# Patient Record
Sex: MALE | Race: NATIVE HAWAIIAN OR OTHER PACIFIC ISLANDER | Employment: FULL TIME | URBAN - METROPOLITAN AREA
[De-identification: names, ages, dates, MRNs, and addresses within clinical notes are randomized per-mention and may not be internally consistent; named-entity substitution may affect disease eponyms.]

---

## 2023-07-24 ENCOUNTER — OFFICE VISIT (OUTPATIENT)
Dept: FAMILY MEDICINE CLINIC | Age: 63
End: 2023-07-24
Payer: COMMERCIAL

## 2023-07-24 VITALS
HEART RATE: 94 BPM | WEIGHT: 167 LBS | DIASTOLIC BLOOD PRESSURE: 62 MMHG | BODY MASS INDEX: 21.43 KG/M2 | SYSTOLIC BLOOD PRESSURE: 102 MMHG | TEMPERATURE: 99.9 F | HEIGHT: 74 IN | OXYGEN SATURATION: 95 %

## 2023-07-24 DIAGNOSIS — R63.4 UNINTENTIONAL WEIGHT LOSS OF MORE THAN 10 POUNDS IN 90 DAYS: ICD-10-CM

## 2023-07-24 DIAGNOSIS — R61 ABNORMAL FLUSHING AND SWEATING: ICD-10-CM

## 2023-07-24 DIAGNOSIS — R53.83 OTHER FATIGUE: ICD-10-CM

## 2023-07-24 DIAGNOSIS — Z12.5 ENCOUNTER FOR SCREENING FOR MALIGNANT NEOPLASM OF PROSTATE: ICD-10-CM

## 2023-07-24 DIAGNOSIS — I82.461 ACUTE DEEP VEIN THROMBOSIS (DVT) OF CALF MUSCLE VEIN OF RIGHT LOWER EXTREMITY (HCC): Primary | ICD-10-CM

## 2023-07-24 DIAGNOSIS — L03.116 CELLULITIS OF LEFT FOOT: ICD-10-CM

## 2023-07-24 DIAGNOSIS — Z13.220 SCREENING, LIPID: ICD-10-CM

## 2023-07-24 DIAGNOSIS — Z87.891 PERSONAL HISTORY OF TOBACCO USE: ICD-10-CM

## 2023-07-24 DIAGNOSIS — Z13.29 SCREENING FOR THYROID DISORDER: ICD-10-CM

## 2023-07-24 DIAGNOSIS — R73.9 ELEVATED BLOOD SUGAR: ICD-10-CM

## 2023-07-24 DIAGNOSIS — R23.2 ABNORMAL FLUSHING AND SWEATING: ICD-10-CM

## 2023-07-24 DIAGNOSIS — Z13.0 SCREENING, ANEMIA, DEFICIENCY, IRON: ICD-10-CM

## 2023-07-24 DIAGNOSIS — M25.472 LEFT ANKLE SWELLING: ICD-10-CM

## 2023-07-24 PROCEDURE — 99204 OFFICE O/P NEW MOD 45 MIN: CPT | Performed by: INTERNAL MEDICINE

## 2023-07-24 PROCEDURE — G0296 VISIT TO DETERM LDCT ELIG: HCPCS | Performed by: INTERNAL MEDICINE

## 2023-07-24 RX ORDER — CEPHALEXIN 500 MG/1
500 CAPSULE ORAL 4 TIMES DAILY
Qty: 28 CAPSULE | Refills: 0 | COMMUNITY
Start: 2023-07-18 | End: 2023-07-24

## 2023-07-24 RX ORDER — COLCHICINE 0.6 MG/1
TABLET ORAL
Qty: 3 TABLET | Refills: 1 | Status: SHIPPED | OUTPATIENT
Start: 2023-07-24 | End: 2023-08-02

## 2023-07-24 RX ORDER — SULFAMETHOXAZOLE AND TRIMETHOPRIM 800; 160 MG/1; MG/1
1 TABLET ORAL 2 TIMES DAILY
Qty: 14 TABLET | Refills: 0 | COMMUNITY
Start: 2023-07-18 | End: 2023-07-24

## 2023-07-24 SDOH — ECONOMIC STABILITY: FOOD INSECURITY: WITHIN THE PAST 12 MONTHS, THE FOOD YOU BOUGHT JUST DIDN'T LAST AND YOU DIDN'T HAVE MONEY TO GET MORE.: NEVER TRUE

## 2023-07-24 SDOH — ECONOMIC STABILITY: HOUSING INSECURITY
IN THE LAST 12 MONTHS, WAS THERE A TIME WHEN YOU DID NOT HAVE A STEADY PLACE TO SLEEP OR SLEPT IN A SHELTER (INCLUDING NOW)?: NO

## 2023-07-24 SDOH — ECONOMIC STABILITY: FOOD INSECURITY: WITHIN THE PAST 12 MONTHS, YOU WORRIED THAT YOUR FOOD WOULD RUN OUT BEFORE YOU GOT MONEY TO BUY MORE.: NEVER TRUE

## 2023-07-24 SDOH — ECONOMIC STABILITY: INCOME INSECURITY: HOW HARD IS IT FOR YOU TO PAY FOR THE VERY BASICS LIKE FOOD, HOUSING, MEDICAL CARE, AND HEATING?: NOT HARD AT ALL

## 2023-07-24 ASSESSMENT — PATIENT HEALTH QUESTIONNAIRE - PHQ9
SUM OF ALL RESPONSES TO PHQ QUESTIONS 1-9: 0
SUM OF ALL RESPONSES TO PHQ9 QUESTIONS 1 & 2: 0
1. LITTLE INTEREST OR PLEASURE IN DOING THINGS: 0
SUM OF ALL RESPONSES TO PHQ QUESTIONS 1-9: 0
SUM OF ALL RESPONSES TO PHQ QUESTIONS 1-9: 0
2. FEELING DOWN, DEPRESSED OR HOPELESS: 0
SUM OF ALL RESPONSES TO PHQ QUESTIONS 1-9: 0

## 2023-07-24 NOTE — PROGRESS NOTES
Pt is here today to establish care    Pt was in [facility] ER on 07/15/2023 and 07/18/2023   Pt had a blood clot on RT lower leg, and cellulitis of the LT foot,     Blood cot has gotten better, pt on Eliquis on 07/15/2023     Foot is about the same     Pt states that his neck seems to have swollen up since starting the blood thinner and Keflex, 07/18/2023 fr antibiotic, swelling going down, feels like a sore throat, worse when he wakes up in the morning

## 2023-07-24 NOTE — PROGRESS NOTES
[facility]  [facility] Rashida Grant [address]  [address] [facility] [address]  Dept: [phone]  Dept Fax: [phone]      Lopez Frederick is a 65 y.o. male who presents today for hismedical conditions/complaints as noted below. Lopez Frederick is c/o of [facility]        Assessment/Plan:     1. Acute deep vein thrombosis (DVT) of 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 (HCC)  2. Cellulitis of left foot  3. Other fatigue  4. Abnormal flushing and sweating  5. Screening, anemia, deficiency, iron  -     CBC with Auto Differential; Future  -     Comprehensive Metabolic Panel; Future  6. Screening for thyroid disorder  -     TSH With Reflex Ft4; Future  7. Screening, lipid  -     Lipid, Fasting; Future  8. Encounter for screening for malignant neoplasm of prostate  -     PSA Screening; Future  9. Left ankle swelling  -     Uric Acid; Future  -     colchicine (COLCRYS) 0.6 MG tablet; Take two tablets by mouth once, then repeat third tablet an hour later if needed for acute gout flare., Disp-3 tablet, R-1Normal  10. Elevated blood sugar  -     Hemoglobin A1C; Future  11. Unintentional weight loss of more than 10 pounds in 90 days  12. Personal history of tobacco use  -     RI VISIT TO DISCUSS LUNG CA SCREEN W LDCT  -     CT Lung Screen (Annual/Baseline); Future          No follow-ups on file. HPI     Here to est care    Had L foot pain in arch around 7/4, tried switching out supports and slowly got better. R calf pain started shortly thereafter, continued to get worse, went to the ER four days later on 7/15 and was diagnosed with RLE DVT. He and his wife had gone on a road trip for the day a couple days prior to onset of RLE pain, but they made frequent stops to get out of the car and was not in the car more than 90 minutes at a time. Went back to the ER with cellulitis of the left foot on 7/18, xray done, discharged home with Keflex.    His neck swelled up aftr starting the antibiotics but

## 2023-07-25 ENCOUNTER — HOSPITAL ENCOUNTER (OUTPATIENT)
Age: 63
Setting detail: SPECIMEN
Discharge: HOME | End: 2023-07-25

## 2023-07-25 DIAGNOSIS — Z13.29 SCREENING FOR THYROID DISORDER: ICD-10-CM

## 2023-07-25 DIAGNOSIS — R73.9 ELEVATED BLOOD SUGAR: ICD-10-CM

## 2023-07-25 DIAGNOSIS — M25.472 LEFT ANKLE SWELLING: ICD-10-CM

## 2023-07-25 DIAGNOSIS — Z13.220 SCREENING, LIPID: ICD-10-CM

## 2023-07-25 DIAGNOSIS — Z12.5 ENCOUNTER FOR SCREENING FOR MALIGNANT NEOPLASM OF PROSTATE: ICD-10-CM

## 2023-07-25 DIAGNOSIS — Z13.0 SCREENING, ANEMIA, DEFICIENCY, IRON: ICD-10-CM

## 2023-07-25 LAB
ALBUMIN SERPL-MCNC: 3.9 G/DL (ref 3.5–5.2)
ALBUMIN/GLOB SERPL: 1 {RATIO} (ref 1–2.5)
ALP SERPL-CCNC: 82 U/L (ref 40–129)
ALT SERPL-CCNC: 11 U/L (ref 5–41)
ANION GAP SERPL CALCULATED.3IONS-SCNC: 15 MMOL/L (ref 9–17)
AST SERPL-CCNC: 20 U/L
BASOPHILS # BLD: 0.09 K/UL (ref 0–0.2)
BASOPHILS NFR BLD: 1 % (ref 0–2)
BILIRUB SERPL-MCNC: 0.4 MG/DL (ref 0.3–1.2)
BUN SERPL-MCNC: 17 MG/DL (ref 8–23)
CALCIUM SERPL-MCNC: 9.5 MG/DL (ref 8.6–10.4)
CHLORIDE SERPL-SCNC: 103 MMOL/L (ref 98–107)
CHOLEST SERPL-MCNC: 133 MG/DL
CHOLESTEROL/HDL RATIO: 4.8
CO2 SERPL-SCNC: 21 MMOL/L (ref 20–31)
CREAT SERPL-MCNC: 0.8 MG/DL (ref 0.7–1.2)
EOSINOPHIL # BLD: 0.43 K/UL (ref 0–0.44)
EOSINOPHILS RELATIVE PERCENT: 4 % (ref 1–4)
ERYTHROCYTE [DISTWIDTH] IN BLOOD BY AUTOMATED COUNT: 14 % (ref 11.8–14.4)
GFR SERPL CREATININE-BSD FRML MDRD: >60 ML/MIN/1.73M2
GLUCOSE SERPL-MCNC: 86 MG/DL (ref 70–99)
HCT VFR BLD AUTO: 38.4 % (ref 40.7–50.3)
HDLC SERPL-MCNC: 28 MG/DL
HGB BLD-MCNC: 12 G/DL (ref 13–17)
IMM GRANULOCYTES # BLD AUTO: 0.03 K/UL (ref 0–0.3)
IMM GRANULOCYTES NFR BLD: 0 %
LDLC SERPL CALC-MCNC: 88 MG/DL (ref 0–130)
LYMPHOCYTES NFR BLD: 2.09 K/UL (ref 1.1–3.7)
LYMPHOCYTES RELATIVE PERCENT: 19 % (ref 24–43)
MCH RBC QN AUTO: 28 PG (ref 25.2–33.5)
MCHC RBC AUTO-ENTMCNC: 31.3 G/DL (ref 28.4–34.8)
MCV RBC AUTO: 89.5 FL (ref 82.6–102.9)
MONOCYTES NFR BLD: 1.18 K/UL (ref 0.1–1.2)
MONOCYTES NFR BLD: 11 % (ref 3–12)
NEUTROPHILS NFR BLD: 65 % (ref 36–65)
NEUTS SEG NFR BLD: 7.15 K/UL (ref 1.5–8.1)
NRBC BLD-RTO: 0 PER 100 WBC
PLATELET # BLD AUTO: 304 K/UL (ref 138–453)
PMV BLD AUTO: 10.8 FL (ref 8.1–13.5)
POTASSIUM SERPL-SCNC: 4.5 MMOL/L (ref 3.7–5.3)
PROT SERPL-MCNC: 7.7 G/DL (ref 6.4–8.3)
PSA SERPL-MCNC: 1.62 NG/ML
RBC # BLD AUTO: 4.29 M/UL (ref 4.21–5.77)
SODIUM SERPL-SCNC: 139 MMOL/L (ref 135–144)
T4 FREE SERPL-MCNC: 3.3 NG/DL (ref 0.9–1.7)
TRIGL SERPL-MCNC: 84 MG/DL
TSH SERPL DL<=0.05 MIU/L-ACNC: <0.01 UIU/ML (ref 0.3–5)
URATE SERPL-MCNC: 4.6 MG/DL (ref 3.4–7)
WBC OTHER # BLD: 11 K/UL (ref 3.5–11.3)

## 2023-07-26 LAB
EST. AVERAGE GLUCOSE BLD GHB EST-MCNC: 114 MG/DL
HBA1C MFR BLD: 5.6 % (ref 4–6)

## 2023-07-27 ENCOUNTER — TELEPHONE (OUTPATIENT)
Dept: ONCOLOGY | Age: 63
End: 2023-07-27

## 2023-07-31 ENCOUNTER — APPOINTMENT (OUTPATIENT)
Dept: GENERAL RADIOLOGY | Age: 63
End: 2023-07-31
Payer: COMMERCIAL

## 2023-07-31 ENCOUNTER — HOSPITAL ENCOUNTER (EMERGENCY)
Age: 63
Discharge: HOME | End: 2023-07-31
Attending: EMERGENCY MEDICINE
Payer: COMMERCIAL

## 2023-07-31 ENCOUNTER — APPOINTMENT (OUTPATIENT)
Dept: CT IMAGING | Age: 63
End: 2023-07-31
Payer: COMMERCIAL

## 2023-07-31 VITALS
SYSTOLIC BLOOD PRESSURE: 137 MMHG | HEART RATE: 57 BPM | OXYGEN SATURATION: 100 % | RESPIRATION RATE: 12 BRPM | TEMPERATURE: 97.5 F | DIASTOLIC BLOOD PRESSURE: 70 MMHG

## 2023-07-31 DIAGNOSIS — R91.8 MASS OF RIGHT LUNG: Primary | ICD-10-CM

## 2023-07-31 LAB
ANION GAP SERPL CALCULATED.3IONS-SCNC: 16 MMOL/L (ref 9–17)
BASOPHILS # BLD: 0.05 K/UL (ref 0–0.2)
BASOPHILS NFR BLD: 0 % (ref 0–2)
BNP SERPL-MCNC: 326 PG/ML
BUN SERPL-MCNC: 23 MG/DL (ref 8–23)
CALCIUM SERPL-MCNC: 9.2 MG/DL (ref 8.6–10.4)
CHLORIDE SERPL-SCNC: 100 MMOL/L (ref 98–107)
CO2 SERPL-SCNC: 23 MMOL/L (ref 20–31)
CREAT SERPL-MCNC: 0.7 MG/DL (ref 0.7–1.2)
EOSINOPHIL # BLD: 0.17 K/UL (ref 0–0.44)
EOSINOPHILS RELATIVE PERCENT: 1 % (ref 1–4)
ERYTHROCYTE [DISTWIDTH] IN BLOOD BY AUTOMATED COUNT: 13.2 % (ref 11.8–14.4)
GFR SERPL CREATININE-BSD FRML MDRD: >60 ML/MIN/1.73M2
GLUCOSE SERPL-MCNC: 97 MG/DL (ref 70–99)
HCT VFR BLD AUTO: 36.9 % (ref 40.7–50.3)
HGB BLD-MCNC: 11.9 G/DL (ref 13–17)
IMM GRANULOCYTES # BLD AUTO: 0.06 K/UL (ref 0–0.3)
IMM GRANULOCYTES NFR BLD: 1 %
LYMPHOCYTES NFR BLD: 1.33 K/UL (ref 1.1–3.7)
LYMPHOCYTES RELATIVE PERCENT: 11 % (ref 24–43)
MCH RBC QN AUTO: 27.7 PG (ref 25.2–33.5)
MCHC RBC AUTO-ENTMCNC: 32.2 G/DL (ref 28.4–34.8)
MCV RBC AUTO: 85.8 FL (ref 82.6–102.9)
MONOCYTES NFR BLD: 0.8 K/UL (ref 0.1–1.2)
MONOCYTES NFR BLD: 7 % (ref 3–12)
NEUTROPHILS NFR BLD: 80 % (ref 36–65)
NEUTS SEG NFR BLD: 9.77 K/UL (ref 1.5–8.1)
NRBC BLD-RTO: 0 PER 100 WBC
PLATELET # BLD AUTO: 341 K/UL (ref 138–453)
PMV BLD AUTO: 11 FL (ref 8.1–13.5)
POTASSIUM SERPL-SCNC: 4.6 MMOL/L (ref 3.7–5.3)
RBC # BLD AUTO: 4.3 M/UL (ref 4.21–5.77)
SODIUM SERPL-SCNC: 139 MMOL/L (ref 135–144)
T4 FREE SERPL-MCNC: 2.3 NG/DL (ref 0.9–1.7)
TROPONIN I SERPL HS-MCNC: 9 NG/L (ref 0–22)
WBC OTHER # BLD: 12.2 K/UL (ref 3.5–11.3)

## 2023-07-31 PROCEDURE — 84439 ASSAY OF FREE THYROXINE: CPT

## 2023-07-31 PROCEDURE — 93005 ELECTROCARDIOGRAM TRACING: CPT

## 2023-07-31 PROCEDURE — 96374 THER/PROPH/DIAG INJ IV PUSH: CPT

## 2023-07-31 PROCEDURE — 71045 X-RAY EXAM CHEST 1 VIEW: CPT

## 2023-07-31 PROCEDURE — 6360000002 HC RX W HCPCS: Performed by: EMERGENCY MEDICINE

## 2023-07-31 PROCEDURE — 84484 ASSAY OF TROPONIN QUANT: CPT

## 2023-07-31 PROCEDURE — 71260 CT THORAX DX C+: CPT

## 2023-07-31 PROCEDURE — 85025 COMPLETE CBC W/AUTO DIFF WBC: CPT

## 2023-07-31 PROCEDURE — 74177 CT ABD & PELVIS W/CONTRAST: CPT

## 2023-07-31 PROCEDURE — 6360000004 HC RX CONTRAST MEDICATION

## 2023-07-31 PROCEDURE — 96376 TX/PRO/DX INJ SAME DRUG ADON: CPT

## 2023-07-31 PROCEDURE — 99285 EMERGENCY DEPT VISIT HI MDM: CPT

## 2023-07-31 PROCEDURE — 2580000003 HC RX 258

## 2023-07-31 PROCEDURE — 6360000002 HC RX W HCPCS

## 2023-07-31 PROCEDURE — 80048 BASIC METABOLIC PNL TOTAL CA: CPT

## 2023-07-31 PROCEDURE — 83880 ASSAY OF NATRIURETIC PEPTIDE: CPT

## 2023-07-31 RX ORDER — 0.9 % SODIUM CHLORIDE 0.9 %
1000 INTRAVENOUS SOLUTION INTRAVENOUS ONCE
Status: COMPLETED | OUTPATIENT
Start: 2023-07-31 | End: 2023-07-31

## 2023-07-31 RX ORDER — ONDANSETRON 4 MG/1
4 TABLET, ORALLY DISINTEGRATING ORAL 3 TIMES DAILY PRN
Qty: 21 TABLET | Refills: 0 | Status: SHIPPED | OUTPATIENT
Start: 2023-07-31 | End: 2023-08-02

## 2023-07-31 RX ORDER — ONDANSETRON 2 MG/ML
4 INJECTION INTRAMUSCULAR; INTRAVENOUS ONCE
Status: COMPLETED | OUTPATIENT
Start: 2023-07-31 | End: 2023-07-31

## 2023-07-31 RX ADMIN — IOPAMIDOL 75 ML: 755 INJECTION, SOLUTION INTRAVENOUS at 14:16

## 2023-07-31 RX ADMIN — SODIUM CHLORIDE 1000 ML: 9 INJECTION, SOLUTION INTRAVENOUS at 11:46

## 2023-07-31 RX ADMIN — ONDANSETRON 4 MG: 2 INJECTION INTRAMUSCULAR; INTRAVENOUS at 11:46

## 2023-07-31 RX ADMIN — ONDANSETRON 4 MG: 2 INJECTION INTRAMUSCULAR; INTRAVENOUS at 12:34

## 2023-07-31 NOTE — DISCHARGE INSTRUCTIONS
You are seen and evaluated for nausea, vomiting, shortness of breath. You are found to have a mass in the right upper lung lobe which is concerning for cancer. You will need to follow-up with your primary care provider to begin the work-up to evaluate this mass further. You are also found to have emphysematous changes concerning for emphysema and should discuss this with your primary care provider. You can use Zofran as needed for nausea. If you begin to experience worsening shortness of breath, chest pain, you should return the emergency department for further evaluation.

## 2023-07-31 NOTE — ED PROVIDER NOTES
[address] ED  Emergency Department Encounter  Emergency Medicine Resident     Pt Name:Jony Johnson  MRN: [id number]  [address] [facility] 1960  Date of evaluation: 7/31/23  PCP:  Marie Rios MD  Note Started: 1:43 PM EDT      CHIEF COMPLAINT       Chief Complaint   Patient presents with    Other     SOB, N/V, Chills, Afebrile       HISTORY OF PRESENT ILLNESS  (Location/Symptom, Timing/Onset, Context/Setting, Quality, Duration, Modifying Factors, Severity.)      Stevenson Mallory is a 65 y.o. male with PMHx of right leg DVT (on Eliquis 07/25/23) who presents with shortness of breath. States SOB began this morning and is accompanied with lightheadedness and intermittent hot/cold flashes. Denies chest pain. Reports vomiting and abdominal pain for the past two days that has resolved. Denies head trauma and LOC. Denies epistaxis, hemoptysis and bloody stools. Patient was recently treated for left foot cellulitis with Bactrim and Keflex, but treatment was discontinued due to neck swelling. PAST MEDICAL / SURGICAL / SOCIAL / FAMILY HISTORY      has no past medical history on file. Right leg unprovoked DVT 7/25/23. Left foot cellulitis. has a past surgical history that includes cyst removal (2010).     Social History     Socioeconomic History    Marital status:      Spouse name: Not on file    Number of children: Not on file    Years of education: Not on file    Highest education level: Not on file   Occupational History    Not on file   Tobacco Use    Smoking status: Every Day     Packs/day: 1.00     Years: 40.00     Pack years: 40.00     Types: Cigarettes     Start date: 3/12/1985    Smokeless tobacco: Never    Tobacco comments:     Currently down to half pack per day - 7/2023   Vaping Use    Vaping Use: Never used   Substance and Sexual Activity    Alcohol use: No    Drug use: No    Sexual activity: Not on file   Other Topics Concern    Not on file   Social History Narrative    Not on file

## 2023-07-31 NOTE — ED NOTES
Pt respirations are even and unlabored, pt is alert and oriented X 4, speaking in complete sentences, bed is in the lowest position, call light is within reach, NAD noted. Will continue to follow plan of care.         Miley Reddy RN  07/31/23 1015

## 2023-07-31 NOTE — ED NOTES
Pt respirations are even and unlabored, pt is alert and oriented X 4, speaking in complete sentences, bed is in the lowest position, call light is within reach, NAD noted. Will continue to follow plan of care.         Jack Núñez RN  07/31/23 5311

## 2023-07-31 NOTE — ED NOTES
Pt respirations are even and unlabored, pt is alert and oriented X 4, speaking in complete sentences, bed is in the lowest position, call light is within reach, NAD noted. Will continue to follow plan of care.         Valerie Gallegos RN  07/31/23 7381

## 2023-07-31 NOTE — ED NOTES
Pt arrives to ED 35 via triage. Pt co nausea, vomiting and chills. Pt states that the symptoms began 2 days ago, but got better yesterday and he was able to eat yesterday. Pt states that when waking up this morning he began to vomit and experienced nausea and chills. Pt states that he has also been experiencing SOB that is new today. Pt states that he has been experiencing abdominal pain in the left lower quadrant. Pt states that he recently was diagnosed with a DVT in his leg, and was placed on blood thinners. Pt denies any blood in his vomit. Pt states that he was also diagnosed with gout and was put on two different antibiotics. Pt respirations are even and unlabored, pt is alert and oriented X 4, speaking in complete sentences, bed is in the lowest position, call light is within reach. Will continue to follow plan of care.         Cassia Faria RN  07/31/23 0898

## 2023-07-31 NOTE — ED NOTES
Pt respirations are even and unlabored, pt is alert and oriented X 4, speaking in complete sentences, bed is in the lowest position, call light is within reach, NAD noted. Will continue to follow plan of care.         Champ Batch, RN  07/31/23 8578

## 2023-08-01 DIAGNOSIS — E05.90 HYPERTHYROIDISM: Primary | ICD-10-CM

## 2023-08-02 ENCOUNTER — TELEPHONE (OUTPATIENT)
Dept: CASE MANAGEMENT | Age: 63
End: 2023-08-02

## 2023-08-02 ENCOUNTER — HOSPITAL ENCOUNTER (OUTPATIENT)
Age: 63
Setting detail: SPECIMEN
Discharge: HOME | End: 2023-08-02

## 2023-08-02 ENCOUNTER — OFFICE VISIT (OUTPATIENT)
Dept: FAMILY MEDICINE CLINIC | Age: 63
End: 2023-08-02
Payer: COMMERCIAL

## 2023-08-02 VITALS
OXYGEN SATURATION: 98 % | SYSTOLIC BLOOD PRESSURE: 106 MMHG | DIASTOLIC BLOOD PRESSURE: 68 MMHG | WEIGHT: 164 LBS | BODY MASS INDEX: 20.91 KG/M2 | TEMPERATURE: 98.2 F | HEART RATE: 78 BPM

## 2023-08-02 DIAGNOSIS — R63.4 UNINTENTIONAL WEIGHT LOSS OF MORE THAN 10 POUNDS IN 90 DAYS: ICD-10-CM

## 2023-08-02 DIAGNOSIS — E04.9 GOITER: ICD-10-CM

## 2023-08-02 DIAGNOSIS — E05.90 HYPERTHYROIDISM: ICD-10-CM

## 2023-08-02 DIAGNOSIS — R91.8 MASS OF UPPER LOBE OF RIGHT LUNG: Primary | ICD-10-CM

## 2023-08-02 DIAGNOSIS — I82.461 ACUTE DEEP VEIN THROMBOSIS (DVT) OF CALF MUSCLE VEIN OF RIGHT LOWER EXTREMITY (HCC): ICD-10-CM

## 2023-08-02 PROCEDURE — 99215 OFFICE O/P EST HI 40 MIN: CPT | Performed by: INTERNAL MEDICINE

## 2023-08-02 RX ORDER — METHIMAZOLE 5 MG/1
5 TABLET ORAL DAILY
Qty: 30 TABLET | Refills: 1 | Status: SHIPPED | OUTPATIENT
Start: 2023-08-02

## 2023-08-02 NOTE — TELEPHONE ENCOUNTER
Name: Preet Mcgowan  : 1960  MRN: [id number]    Oncology Navigation- Initial Note:  (Unknown)  Navigator received message from Dr. Steven Foy notifying writer of pt. Needing assistance . Writer will add self to care team and reach out to pt.    Electronically signed by Kasie Whitfield RN on 2023 at 4:22 PM

## 2023-08-03 ENCOUNTER — TELEPHONE (OUTPATIENT)
Dept: ONCOLOGY | Age: 63
End: 2023-08-03

## 2023-08-03 ENCOUNTER — TELEPHONE (OUTPATIENT)
Dept: CASE MANAGEMENT | Age: 63
End: 2023-08-03

## 2023-08-03 LAB
EKG ATRIAL RATE: 50 BPM
EKG P AXIS: 59 DEGREES
EKG P-R INTERVAL: 106 MS
EKG Q-T INTERVAL: 442 MS
EKG QRS DURATION: 94 MS
EKG QTC CALCULATION (BAZETT): 402 MS
EKG R AXIS: 76 DEGREES
EKG T AXIS: 72 DEGREES
EKG VENTRICULAR RATE: 50 BPM

## 2023-08-03 NOTE — TELEPHONE ENCOUNTER
RECEIVED A CALL FROM FAM IN IR STATING PT'S LUNG BX CAN BE SCHEDULED FOR 08/10/23 HOWEVER THAT IS WHEN HIS PET/CT . WRITER CALLED TO RESCHEDULE PET/CT. PET/CT MOVED TO 08/11/23 @3:30PM. WRITER INFORMED PT OF APPOINTMENT CHANGES. BX SCHEDULED 08/10/23. DR Lenard Francis WILL SEE PT FOR CONSULTATION APPOINTMENT 08/14/23.

## 2023-08-03 NOTE — TELEPHONE ENCOUNTER
Name: Emily Bergeron  : 1960  MRN: [id number]    Oncology Navigation- Initial Note:  Navigator spoke with pt. Offering assistance . Pt. Given contact information and resources offered if needed. Pt. Awaits Bx date, however writer will follow closely.   Electronically signed by Renetta Rivas RN on 8/3/2023 at 8:47 AM

## 2023-08-04 LAB
THYROGLOBULIN AB: <12 IU/ML (ref 0–40)
THYROPEROXIDASE AB SERPL IA-ACNC: <4 IU/ML (ref 0–25)

## 2023-08-09 ASSESSMENT — ENCOUNTER SYMPTOMS
VOMITING: 0
DIARRHEA: 0
ABDOMINAL PAIN: 0
ANAL BLEEDING: 0
BLOOD IN STOOL: 0
CHEST TIGHTNESS: 0
SHORTNESS OF BREATH: 0
CONSTIPATION: 0
COUGH: 0
NAUSEA: 0
WHEEZING: 0
CHOKING: 0

## 2023-08-09 ASSESSMENT — VISUAL ACUITY: OU: 1

## 2023-08-10 ENCOUNTER — HOSPITAL ENCOUNTER (OUTPATIENT)
Dept: CT IMAGING | Age: 63
Discharge: HOME | End: 2023-08-12
Payer: COMMERCIAL

## 2023-08-10 VITALS
OXYGEN SATURATION: 98 % | TEMPERATURE: 97.5 F | DIASTOLIC BLOOD PRESSURE: 73 MMHG | RESPIRATION RATE: 16 BRPM | HEIGHT: 75 IN | WEIGHT: 165 LBS | SYSTOLIC BLOOD PRESSURE: 104 MMHG | BODY MASS INDEX: 20.51 KG/M2 | HEART RATE: 72 BPM

## 2023-08-10 DIAGNOSIS — R91.8 MASS OF UPPER LOBE OF RIGHT LUNG: ICD-10-CM

## 2023-08-10 LAB
INR PPP: NORMAL
PLATELET # BLD AUTO: 374 K/UL (ref 138–453)
PROTHROMBIN TIME: NORMAL SEC (ref 11.7–14.9)
REASON FOR REJECTION: NORMAL
SPECIMEN SOURCE: NORMAL
ZZ NTE CLEAN UP: ORDERED TEST: NORMAL

## 2023-08-10 PROCEDURE — 2580000003 HC RX 258: Performed by: PHYSICIAN ASSISTANT

## 2023-08-10 PROCEDURE — 85610 PROTHROMBIN TIME: CPT

## 2023-08-10 PROCEDURE — 85049 AUTOMATED PLATELET COUNT: CPT

## 2023-08-10 RX ORDER — SODIUM CHLORIDE 9 MG/ML
INJECTION, SOLUTION INTRAVENOUS CONTINUOUS
Status: DISCONTINUED | OUTPATIENT
Start: 2023-08-10 | End: 2023-08-13 | Stop reason: HOSPADM

## 2023-08-10 RX ADMIN — SODIUM CHLORIDE: 9 INJECTION, SOLUTION INTRAVENOUS at 09:40

## 2023-08-10 ASSESSMENT — PAIN - FUNCTIONAL ASSESSMENT: PAIN_FUNCTIONAL_ASSESSMENT: 0-10

## 2023-08-10 NOTE — H&P
History and Physical    Pt Name: Azell Cockayne  MRN: [id number]  YOB: 1960  Date of evaluation: 8/10/2023    SUBJECTIVE:   History of Chief Complaint:    Patient presents preprocedure for lung biopsy. He says that he was experiencing difficulty with his breathing so he went to the ER. On imaging, he was noted to have lung abnormality. He has been scheduled now for biopsy. Patient has a history of smoking, less than 1 PPD for 40 years, just quit recently. Past Medical History    has a past medical history of History of DVT (deep vein thrombosis), Snores, and Thyroid disease. Past Surgical History   has a past surgical history that includes cyst removal (2010) and Dental surgery. Medications  Prior to Admission medications    Medication Sig Start Date End Date Taking? Authorizing Provider   methIMAzole (TAPAZOLE) 5 MG tablet Take 1 tablet by mouth daily 8/2/23   Keisha Nj MD   apixaban (ELIQUIS) 5 MG TABS tablet Take 1 tablet by mouth in the morning and at bedtime 7/15/23 8/14/23  Historical Provider, MD     Allergies  has No Known Allergies. Family History  family history includes Heart Attack (age of onset: 39) in his brother; Heart Disease in his brother and father; Leukemia (age of onset: 80) in his mother; Other in his father. Social History   reports that he has been smoking cigarettes. He started smoking about 38 years ago. He has a 40.00 pack-year smoking history. He has never used smokeless tobacco.   reports no history of alcohol use. reports no history of drug use.   Marital Status     Review of Systems:  CONSTITUTIONAL:   negative for fevers, chills, fatigue and malaise    EYES:   negative for double vision, blurred vision and photophobia    HEENT:   negative for tinnitus, epistaxis and sore throat     RESPIRATORY:   See HPI   CARDIOVASCULAR:   negative for chest pain, palpitations, syncope, edema  positive for DVT   GASTROINTESTINAL:   negative for nausea, vomiting

## 2023-08-10 NOTE — H&P (VIEW-ONLY)
History and Physical    Pt Name: Stevenson Mallory  MRN: [id number]  YOB: 1960  Date of evaluation: 8/10/2023    SUBJECTIVE:   History of Chief Complaint:    Patient presents preprocedure for lung biopsy. He says that he was experiencing difficulty with his breathing so he went to the ER. On imaging, he was noted to have lung abnormality. He has been scheduled now for biopsy. Patient has a history of smoking, less than 1 PPD for 40 years, just quit recently. Past Medical History    has a past medical history of History of DVT (deep vein thrombosis), Snores, and Thyroid disease. Past Surgical History   has a past surgical history that includes cyst removal (2010) and Dental surgery. Medications  Prior to Admission medications    Medication Sig Start Date End Date Taking? Authorizing Provider   methIMAzole (TAPAZOLE) 5 MG tablet Take 1 tablet by mouth daily 8/2/23   Keisha Snowden MD   apixaban (ELIQUIS) 5 MG TABS tablet Take 1 tablet by mouth in the morning and at bedtime 7/15/23 8/14/23  Historical Provider, MD     Allergies  has No Known Allergies. Family History  family history includes Heart Attack (age of onset: 39) in his brother; Heart Disease in his brother and father; Leukemia (age of onset: 80) in his mother; Other in his father. Social History   reports that he has been smoking cigarettes. He started smoking about 38 years ago. He has a 40.00 pack-year smoking history. He has never used smokeless tobacco.   reports no history of alcohol use. reports no history of drug use.   Marital Status     Review of Systems:  CONSTITUTIONAL:   negative for fevers, chills, fatigue and malaise    EYES:   negative for double vision, blurred vision and photophobia    HEENT:   negative for tinnitus, epistaxis and sore throat     RESPIRATORY:   See HPI   CARDIOVASCULAR:   negative for chest pain, palpitations, syncope, edema  positive for DVT   GASTROINTESTINAL:   negative for nausea, vomiting

## 2023-08-11 ENCOUNTER — HOSPITAL ENCOUNTER (OUTPATIENT)
Dept: NUCLEAR MEDICINE | Age: 63
End: 2023-08-11
Payer: COMMERCIAL

## 2023-08-11 ENCOUNTER — APPOINTMENT (OUTPATIENT)
Dept: NUCLEAR MEDICINE | Age: 63
End: 2023-08-11
Payer: COMMERCIAL

## 2023-08-11 DIAGNOSIS — R91.8 MASS OF UPPER LOBE OF RIGHT LUNG: ICD-10-CM

## 2023-08-11 PROCEDURE — 2580000003 HC RX 258: Performed by: INTERNAL MEDICINE

## 2023-08-11 PROCEDURE — 78815 PET IMAGE W/CT SKULL-THIGH: CPT

## 2023-08-11 PROCEDURE — 3430000000 HC RX DIAGNOSTIC RADIOPHARMACEUTICAL: Performed by: INTERNAL MEDICINE

## 2023-08-11 PROCEDURE — 82947 ASSAY GLUCOSE BLOOD QUANT: CPT

## 2023-08-11 PROCEDURE — A9552 F18 FDG: HCPCS | Performed by: INTERNAL MEDICINE

## 2023-08-11 RX ORDER — SODIUM CHLORIDE 0.9 % (FLUSH) 0.9 %
10 SYRINGE (ML) INJECTION
Status: COMPLETED | OUTPATIENT
Start: 2023-08-11 | End: 2023-08-11

## 2023-08-11 RX ORDER — FLUDEOXYGLUCOSE F 18 200 MCI/ML
10 INJECTION, SOLUTION INTRAVENOUS
Status: COMPLETED | OUTPATIENT
Start: 2023-08-11 | End: 2023-08-11

## 2023-08-11 RX ADMIN — SODIUM CHLORIDE, PRESERVATIVE FREE 10 ML: 5 INJECTION INTRAVENOUS at 15:25

## 2023-08-11 RX ADMIN — FLUDEOXYGLUCOSE F 18 10.1 MILLICURIE: 200 INJECTION, SOLUTION INTRAVENOUS at 15:25

## 2023-08-14 LAB — GLUCOSE BLD-MCNC: 82 MG/DL (ref 75–110)

## 2023-08-15 ENCOUNTER — HOSPITAL ENCOUNTER (OUTPATIENT)
Dept: CT IMAGING | Age: 63
Discharge: HOME | End: 2023-08-17
Payer: COMMERCIAL

## 2023-08-15 ENCOUNTER — HOSPITAL ENCOUNTER (OUTPATIENT)
Dept: ULTRASOUND IMAGING | Age: 63
Discharge: HOME | End: 2023-08-17
Payer: COMMERCIAL

## 2023-08-15 VITALS
OXYGEN SATURATION: 97 % | DIASTOLIC BLOOD PRESSURE: 74 MMHG | RESPIRATION RATE: 15 BRPM | SYSTOLIC BLOOD PRESSURE: 116 MMHG | HEART RATE: 63 BPM

## 2023-08-15 VITALS
DIASTOLIC BLOOD PRESSURE: 73 MMHG | RESPIRATION RATE: 16 BRPM | TEMPERATURE: 96.6 F | HEART RATE: 62 BPM | OXYGEN SATURATION: 97 % | SYSTOLIC BLOOD PRESSURE: 112 MMHG

## 2023-08-15 DIAGNOSIS — R91.8 MASS OF UPPER LOBE OF RIGHT LUNG: ICD-10-CM

## 2023-08-15 PROCEDURE — 38505 NEEDLE BIOPSY LYMPH NODES: CPT

## 2023-08-15 PROCEDURE — 76942 ECHO GUIDE FOR BIOPSY: CPT

## 2023-08-15 PROCEDURE — 2580000003 HC RX 258: Performed by: PHYSICIAN ASSISTANT

## 2023-08-15 PROCEDURE — 7100000010 HC PHASE II RECOVERY - FIRST 15 MIN: Performed by: RADIOLOGY

## 2023-08-15 RX ORDER — SODIUM CHLORIDE 9 MG/ML
INJECTION, SOLUTION INTRAVENOUS CONTINUOUS
Status: DISCONTINUED | OUTPATIENT
Start: 2023-08-15 | End: 2023-08-18 | Stop reason: HOSPADM

## 2023-08-15 RX ADMIN — SODIUM CHLORIDE: 9 INJECTION, SOLUTION INTRAVENOUS at 08:50

## 2023-08-15 ASSESSMENT — PAIN - FUNCTIONAL ASSESSMENT: PAIN_FUNCTIONAL_ASSESSMENT: 0-10

## 2023-08-15 ASSESSMENT — PAIN DESCRIPTION - DESCRIPTORS: DESCRIPTORS: DULL;SHARP

## 2023-08-15 NOTE — INTERVAL H&P NOTE
Pt Name: Joy Juarez  MRN: [id number]  [facility] 1960  Date of evaluation: 8/15/2023    I have reviewed the patient's history and physical examination completed on 8/10/23 in pre-op area. No changes to history or on examination today, unless noted below. Patient reports lung biopsy postponed that day as he had not had PET scan yet. He has since had PET scan completed yesterday.      Yordy Plummer, IVIS - CNP  8/15/23  8:57 AM

## 2023-08-15 NOTE — BRIEF OP NOTE
Brief Postoperative Note    Luis Armando Greenwood  YOB: 1960  [id number]    Pre-operative Diagnosis: Cervical lymphadenopathy    Post-operative Diagnosis: Same    Procedure: LN bx    Anesthesia: Local    Surgeons/Assistants: Chelsy Gutierrez MD    Estimated Blood Loss: less than 50     Complications: None    Specimens: Was Obtained:     Findings: Successful right cervical LN bx.  3 cores obtained.      Electronically signed by ADIA Howe on 8/15/2023 at 10:13 AM

## 2023-08-16 ENCOUNTER — TELEPHONE (OUTPATIENT)
Dept: FAMILY MEDICINE CLINIC | Age: 63
End: 2023-08-16

## 2023-08-16 NOTE — TELEPHONE ENCOUNTER
----- Message from Mary Matias sent at 8/16/2023 10:51 AM EDT -----  Subject: Results Request    QUESTIONS  Results: several; Ordered by: Evens Clayton Performed:   ---------------------------------------------------------------------------  --------------  Mary POON    [phone]; OK to leave message on voicemail  ---------------------------------------------------------------------------  --------------

## 2023-08-21 ENCOUNTER — TELEPHONE (OUTPATIENT)
Dept: ONCOLOGY | Age: 63
End: 2023-08-21

## 2023-08-21 ENCOUNTER — TELEPHONE (OUTPATIENT)
Dept: CASE MANAGEMENT | Age: 63
End: 2023-08-21

## 2023-08-21 ENCOUNTER — INITIAL CONSULT (OUTPATIENT)
Dept: ONCOLOGY | Age: 63
End: 2023-08-21
Payer: COMMERCIAL

## 2023-08-21 VITALS
HEART RATE: 69 BPM | WEIGHT: 169.5 LBS | SYSTOLIC BLOOD PRESSURE: 121 MMHG | TEMPERATURE: 98.2 F | BODY MASS INDEX: 21.19 KG/M2 | DIASTOLIC BLOOD PRESSURE: 72 MMHG

## 2023-08-21 DIAGNOSIS — I82.461 ACUTE DEEP VEIN THROMBOSIS (DVT) OF CALF MUSCLE VEIN OF RIGHT LOWER EXTREMITY (HCC): Primary | ICD-10-CM

## 2023-08-21 DIAGNOSIS — C77.1 METASTASIS TO MEDIASTINAL LYMPH NODE (HCC): ICD-10-CM

## 2023-08-21 DIAGNOSIS — E07.9 THYROID MASS: ICD-10-CM

## 2023-08-21 DIAGNOSIS — C34.90 MALIGNANT NEOPLASM OF LUNG, UNSPECIFIED LATERALITY, UNSPECIFIED PART OF LUNG (HCC): ICD-10-CM

## 2023-08-21 DIAGNOSIS — C77.0 METASTASIS TO CERVICAL LYMPH NODE (HCC): ICD-10-CM

## 2023-08-21 LAB — SURGICAL PATHOLOGY REPORT: NORMAL

## 2023-08-21 PROCEDURE — 99211 OFF/OP EST MAY X REQ PHY/QHP: CPT

## 2023-08-21 PROCEDURE — 99245 OFF/OP CONSLTJ NEW/EST HI 55: CPT | Performed by: INTERNAL MEDICINE

## 2023-08-21 RX ORDER — PREDNISONE 20 MG/1
20 TABLET ORAL DAILY
Qty: 5 TABLET | Refills: 0 | Status: SHIPPED | OUTPATIENT
Start: 2023-08-21 | End: 2023-08-26

## 2023-08-21 NOTE — TELEPHONE ENCOUNTER
Yoni Wing MD CONSULT   MRI BRAIN WITH AND WITHOUT CONTRAST- 8-29-23 4 PM AT [facility]    IR CONSULT FOR THYROID BIOPSY   SPOKE WITH MARKO IN IR, WILL NEED THE RESULTS OF THE THYROID ULTRASOUND FOR THE RADIOLOGIST TO REVIEW AND THEN SHE WILL CALL THE PT TO SCHEDULE  THYROID ULTRASOUND IS ON 8-22-23  RV 9-11-23 9 AM      ADDENDUM: DR Leigh Valverde ORDERED TEMPUS TESTING   FORM FILLED OUT, SIGNED BY DR Leigh Valverde  CALLED FRANCO, HE IS HAVING AN ULTRASOUND TOMORROW AT [facility] AND WILL STOP BY THE [facility]ER [facility] FOR THE TEMPUS TESTING

## 2023-08-21 NOTE — PROGRESS NOTES
abnormal  metabolic activity concerning for neoplastic involvement. IMPRESSION:   Clinical diagnosis of metastatic lung cancer  However, PET scan is showing significant activity in the thyroid gland, raising possibility of this being thyroid cancer  PLAN:   Had a very long discussion with the patient. We will biopsy his thyroid as soon as possible. I will talk to the pathologist about running more testing to exclude the possibility of thyroid cancer. COPD the testing would be negative and he has lung cancer, then we will obtain an MRI of the brain for full staging and then we will do molecular testing to consider systemic therapy. Meanwhile, we are planning to offer him a short course of steroid to see if that will decrease his swelling  We will discuss his case with vascular to see if he has SVC syndrome  Return after the biopsy    Sepideh Guzmán MD  Hematologist/Medical [address] [facility] [address] hematology oncology physicians      I spent a total of 60 minutes on the date of the service which included preparing to see the patient, face-to-face patient care, completing clinical documentation, obtaining and/or reviewing separately obtained history, performing a medically appropriate examination, counseling and educating the patient/family/caregiver, ordering medications, tests, or procedures, communicating with other HCPs (not separately reported), independently interpreting results (not separately reported), communicating results to the patient/family/caregiver and care coordination (not separately reported).

## 2023-08-21 NOTE — TELEPHONE ENCOUNTER
Name: Beronica Martínez  : 1960  MRN: [id number]    Oncology Navigation Follow-Up Note  Navigator spoke with pt. And spouse offering assistance if needed. Again verbalized resources available and will be sending packet in mail. Writer reviewing appt. With pt. And will plan to follow. Writer noting pt. Has very recently quit smoking and along with business card have included in packet smoking cessation information. Smoking cessation assistance and resources provided.   Electronically signed by Maryetta Claude, RN on 2023 at 1:57 PM

## 2023-08-22 ENCOUNTER — HOSPITAL ENCOUNTER (OUTPATIENT)
Age: 63
Discharge: HOME | End: 2023-08-22
Payer: COMMERCIAL

## 2023-08-22 ENCOUNTER — HOSPITAL ENCOUNTER (OUTPATIENT)
Dept: ULTRASOUND IMAGING | Age: 63
Discharge: HOME | End: 2023-08-24
Payer: COMMERCIAL

## 2023-08-22 DIAGNOSIS — C34.90 MALIGNANT NEOPLASM OF LUNG, UNSPECIFIED LATERALITY, UNSPECIFIED PART OF LUNG (HCC): ICD-10-CM

## 2023-08-22 DIAGNOSIS — I82.461 ACUTE DEEP VEIN THROMBOSIS (DVT) OF CALF MUSCLE VEIN OF RIGHT LOWER EXTREMITY (HCC): ICD-10-CM

## 2023-08-22 DIAGNOSIS — E05.90 HYPERTHYROIDISM: ICD-10-CM

## 2023-08-22 DIAGNOSIS — C77.1 METASTASIS TO MEDIASTINAL LYMPH NODE (HCC): ICD-10-CM

## 2023-08-22 DIAGNOSIS — C77.0 METASTASIS TO CERVICAL LYMPH NODE (HCC): ICD-10-CM

## 2023-08-22 DIAGNOSIS — E07.9 THYROID MASS: ICD-10-CM

## 2023-08-22 DIAGNOSIS — E04.9 GOITER: ICD-10-CM

## 2023-08-22 PROCEDURE — 76536 US EXAM OF HEAD AND NECK: CPT

## 2023-08-22 PROCEDURE — 36415 COLL VENOUS BLD VENIPUNCTURE: CPT

## 2023-08-24 ENCOUNTER — TELEPHONE (OUTPATIENT)
Dept: INTERVENTIONAL RADIOLOGY/VASCULAR | Age: 63
End: 2023-08-24

## 2023-08-24 ENCOUNTER — TELEPHONE (OUTPATIENT)
Dept: INFUSION THERAPY | Facility: MEDICAL CENTER | Age: 63
End: 2023-08-24

## 2023-08-24 RX ORDER — PREDNISONE 20 MG/1
20 TABLET ORAL DAILY
Qty: 30 TABLET | Refills: 0 | Status: SHIPPED | OUTPATIENT
Start: 2023-08-24 | End: 2023-09-23

## 2023-08-24 NOTE — TELEPHONE ENCOUNTER
8/21/23, per md note pt given 5 day supply of steroid. Pt called to say it is working and would like refill. Message to md per [facility].

## 2023-08-24 NOTE — TELEPHONE ENCOUNTER
Left message for Dr. Yuen Clock office in regard to bx. Per Dr Ruth Ann De Leon, thyromegaly, pet positive, no distinct nodule to target. Call back number given.

## 2023-08-25 LAB
SEND OUT REPORT: NORMAL
TEST NAME: NORMAL

## 2023-08-28 ENCOUNTER — OFFICE VISIT (OUTPATIENT)
Dept: FAMILY MEDICINE CLINIC | Age: 63
End: 2023-08-28
Payer: COMMERCIAL

## 2023-08-28 VITALS
OXYGEN SATURATION: 99 % | HEART RATE: 72 BPM | SYSTOLIC BLOOD PRESSURE: 124 MMHG | TEMPERATURE: 98.5 F | DIASTOLIC BLOOD PRESSURE: 78 MMHG | BODY MASS INDEX: 21.37 KG/M2 | WEIGHT: 171 LBS

## 2023-08-28 DIAGNOSIS — Z72.0 TOBACCO ABUSE: ICD-10-CM

## 2023-08-28 DIAGNOSIS — E05.90 HYPERTHYROIDISM: ICD-10-CM

## 2023-08-28 DIAGNOSIS — R63.4 UNINTENTIONAL WEIGHT LOSS OF MORE THAN 10 POUNDS IN 90 DAYS: ICD-10-CM

## 2023-08-28 DIAGNOSIS — C34.91 ADENOCARCINOMA, LUNG, RIGHT (HCC): Primary | ICD-10-CM

## 2023-08-28 DIAGNOSIS — I82.461 ACUTE DEEP VEIN THROMBOSIS (DVT) OF CALF MUSCLE VEIN OF RIGHT LOWER EXTREMITY (HCC): ICD-10-CM

## 2023-08-28 DIAGNOSIS — L03.116 CELLULITIS OF LEFT FOOT: ICD-10-CM

## 2023-08-28 PROCEDURE — 99214 OFFICE O/P EST MOD 30 MIN: CPT | Performed by: INTERNAL MEDICINE

## 2023-08-28 RX ORDER — BUPROPION HYDROCHLORIDE 150 MG/1
150 TABLET ORAL EVERY MORNING
Qty: 30 TABLET | Refills: 3 | Status: SHIPPED | OUTPATIENT
Start: 2023-08-28

## 2023-08-29 ENCOUNTER — HOSPITAL ENCOUNTER (OUTPATIENT)
Dept: MRI IMAGING | Age: 63
Discharge: HOME | End: 2023-08-31
Attending: INTERNAL MEDICINE
Payer: COMMERCIAL

## 2023-08-29 DIAGNOSIS — C77.1 METASTASIS TO MEDIASTINAL LYMPH NODE (HCC): ICD-10-CM

## 2023-08-29 DIAGNOSIS — C34.90 MALIGNANT NEOPLASM OF LUNG, UNSPECIFIED LATERALITY, UNSPECIFIED PART OF LUNG (HCC): ICD-10-CM

## 2023-08-29 DIAGNOSIS — C77.0 METASTASIS TO CERVICAL LYMPH NODE (HCC): ICD-10-CM

## 2023-08-29 DIAGNOSIS — E07.9 THYROID MASS: ICD-10-CM

## 2023-08-29 DIAGNOSIS — I82.461 ACUTE DEEP VEIN THROMBOSIS (DVT) OF CALF MUSCLE VEIN OF RIGHT LOWER EXTREMITY (HCC): ICD-10-CM

## 2023-08-29 PROCEDURE — 70553 MRI BRAIN STEM W/O & W/DYE: CPT

## 2023-08-29 PROCEDURE — A9579 GAD-BASE MR CONTRAST NOS,1ML: HCPCS | Performed by: INTERNAL MEDICINE

## 2023-08-29 PROCEDURE — 6360000004 HC RX CONTRAST MEDICATION: Performed by: INTERNAL MEDICINE

## 2023-08-29 RX ORDER — SODIUM CHLORIDE 0.9 % (FLUSH) 0.9 %
10 SYRINGE (ML) INJECTION 2 TIMES DAILY
Status: DISCONTINUED | OUTPATIENT
Start: 2023-08-29 | End: 2023-09-01 | Stop reason: HOSPADM

## 2023-08-29 RX ADMIN — GADOTERIDOL 15 ML: 279.3 INJECTION, SOLUTION INTRAVENOUS at 17:06

## 2023-09-07 ASSESSMENT — ENCOUNTER SYMPTOMS
CONSTIPATION: 0
DIARRHEA: 0
COUGH: 0
NAUSEA: 0
CHEST TIGHTNESS: 0
WHEEZING: 0
SHORTNESS OF BREATH: 0
VOMITING: 0
BLOOD IN STOOL: 0
ANAL BLEEDING: 0
CHOKING: 0
ABDOMINAL PAIN: 0

## 2023-09-07 ASSESSMENT — VISUAL ACUITY: OU: 1

## 2023-09-11 ENCOUNTER — OFFICE VISIT (OUTPATIENT)
Dept: ONCOLOGY | Age: 63
End: 2023-09-11
Payer: COMMERCIAL

## 2023-09-11 ENCOUNTER — TELEPHONE (OUTPATIENT)
Dept: ONCOLOGY | Age: 63
End: 2023-09-11

## 2023-09-11 ENCOUNTER — HOSPITAL ENCOUNTER (OUTPATIENT)
Dept: INFUSION THERAPY | Facility: MEDICAL CENTER | Age: 63
Discharge: HOME | End: 2023-09-11
Payer: COMMERCIAL

## 2023-09-11 VITALS
RESPIRATION RATE: 18 BRPM | WEIGHT: 165.6 LBS | OXYGEN SATURATION: 99 % | HEART RATE: 83 BPM | BODY MASS INDEX: 20.7 KG/M2 | SYSTOLIC BLOOD PRESSURE: 132 MMHG | DIASTOLIC BLOOD PRESSURE: 80 MMHG | TEMPERATURE: 96.9 F

## 2023-09-11 DIAGNOSIS — C77.0 METASTASIS TO CERVICAL LYMPH NODE (HCC): ICD-10-CM

## 2023-09-11 DIAGNOSIS — C34.11 MALIGNANT NEOPLASM OF UPPER LOBE OF RIGHT LUNG (HCC): Primary | ICD-10-CM

## 2023-09-11 DIAGNOSIS — C34.90 MALIGNANT NEOPLASM OF LUNG, UNSPECIFIED LATERALITY, UNSPECIFIED PART OF LUNG (HCC): Primary | ICD-10-CM

## 2023-09-11 DIAGNOSIS — I82.461 ACUTE DEEP VEIN THROMBOSIS (DVT) OF CALF MUSCLE VEIN OF RIGHT LOWER EXTREMITY (HCC): ICD-10-CM

## 2023-09-11 PROCEDURE — 99215 OFFICE O/P EST HI 40 MIN: CPT | Performed by: INTERNAL MEDICINE

## 2023-09-11 PROCEDURE — 6360000002 HC RX W HCPCS: Performed by: INTERNAL MEDICINE

## 2023-09-11 PROCEDURE — 99211 OFF/OP EST MAY X REQ PHY/QHP: CPT | Performed by: INTERNAL MEDICINE

## 2023-09-11 PROCEDURE — 96372 THER/PROPH/DIAG INJ SC/IM: CPT

## 2023-09-11 RX ORDER — SODIUM CHLORIDE 9 MG/ML
5-250 INJECTION, SOLUTION INTRAVENOUS PRN
OUTPATIENT
Start: 2023-09-18

## 2023-09-11 RX ORDER — HEPARIN SODIUM (PORCINE) LOCK FLUSH IV SOLN 100 UNIT/ML 100 UNIT/ML
500 SOLUTION INTRAVENOUS PRN
OUTPATIENT
Start: 2023-09-18

## 2023-09-11 RX ORDER — DEXAMETHASONE 4 MG/1
TABLET ORAL
Qty: 30 TABLET | Refills: 3 | Status: SHIPPED | OUTPATIENT
Start: 2023-09-11

## 2023-09-11 RX ORDER — SODIUM CHLORIDE 9 MG/ML
INJECTION, SOLUTION INTRAVENOUS CONTINUOUS
OUTPATIENT
Start: 2023-09-18

## 2023-09-11 RX ORDER — CYANOCOBALAMIN 1000 UG/ML
1000 INJECTION, SOLUTION INTRAMUSCULAR; SUBCUTANEOUS ONCE
Status: CANCELLED | OUTPATIENT
Start: 2023-09-11 | End: 2023-09-11

## 2023-09-11 RX ORDER — ONDANSETRON 2 MG/ML
8 INJECTION INTRAMUSCULAR; INTRAVENOUS
OUTPATIENT
Start: 2023-09-18

## 2023-09-11 RX ORDER — FAMOTIDINE 10 MG/ML
20 INJECTION, SOLUTION INTRAVENOUS
OUTPATIENT
Start: 2023-09-18

## 2023-09-11 RX ORDER — MEPERIDINE HYDROCHLORIDE 50 MG/ML
12.5 INJECTION INTRAMUSCULAR; INTRAVENOUS; SUBCUTANEOUS PRN
OUTPATIENT
Start: 2023-09-18

## 2023-09-11 RX ORDER — LIDOCAINE AND PRILOCAINE 25; 25 MG/G; MG/G
CREAM TOPICAL
Qty: 30 G | Refills: 2 | Status: SHIPPED | OUTPATIENT
Start: 2023-09-11

## 2023-09-11 RX ORDER — ACETAMINOPHEN 325 MG/1
650 TABLET ORAL
OUTPATIENT
Start: 2023-09-18

## 2023-09-11 RX ORDER — EPINEPHRINE 1 MG/ML
0.3 INJECTION, SOLUTION, CONCENTRATE INTRAVENOUS PRN
OUTPATIENT
Start: 2023-09-18

## 2023-09-11 RX ORDER — CYANOCOBALAMIN 1000 UG/ML
1000 INJECTION, SOLUTION INTRAMUSCULAR; SUBCUTANEOUS
OUTPATIENT
Start: 2023-09-18

## 2023-09-11 RX ORDER — ONDANSETRON HYDROCHLORIDE 8 MG/1
8 TABLET, FILM COATED ORAL EVERY 8 HOURS PRN
Qty: 30 TABLET | Refills: 2 | Status: SHIPPED | OUTPATIENT
Start: 2023-09-11

## 2023-09-11 RX ORDER — CYANOCOBALAMIN 1000 UG/ML
1000 INJECTION, SOLUTION INTRAMUSCULAR; SUBCUTANEOUS ONCE
Status: COMPLETED | OUTPATIENT
Start: 2023-09-11 | End: 2023-09-11

## 2023-09-11 RX ORDER — FOLIC ACID 1 MG/1
1 TABLET ORAL DAILY
Qty: 90 TABLET | Refills: 1 | Status: SHIPPED | OUTPATIENT
Start: 2023-09-11

## 2023-09-11 RX ORDER — CYANOCOBALAMIN 1000 UG/ML
1000 INJECTION, SOLUTION INTRAMUSCULAR; SUBCUTANEOUS ONCE
Status: SHIPPED | OUTPATIENT
Start: 2023-09-11

## 2023-09-11 RX ORDER — SODIUM CHLORIDE 0.9 % (FLUSH) 0.9 %
5-40 SYRINGE (ML) INJECTION PRN
OUTPATIENT
Start: 2023-09-18

## 2023-09-11 RX ORDER — PALONOSETRON 0.05 MG/ML
0.25 INJECTION, SOLUTION INTRAVENOUS ONCE
OUTPATIENT
Start: 2023-09-18 | End: 2023-09-18

## 2023-09-11 RX ORDER — OMEPRAZOLE 20 MG/1
20 TABLET, DELAYED RELEASE ORAL DAILY
Qty: 30 TABLET | Refills: 3 | Status: SHIPPED | OUTPATIENT
Start: 2023-09-11

## 2023-09-11 RX ORDER — ALBUTEROL SULFATE 90 UG/1
4 AEROSOL, METERED RESPIRATORY (INHALATION) PRN
OUTPATIENT
Start: 2023-09-18

## 2023-09-11 RX ORDER — DIPHENHYDRAMINE HYDROCHLORIDE 50 MG/ML
50 INJECTION INTRAMUSCULAR; INTRAVENOUS
OUTPATIENT
Start: 2023-09-18

## 2023-09-11 RX ADMIN — CYANOCOBALAMIN 1000 MCG: 1000 INJECTION INTRAMUSCULAR; SUBCUTANEOUS at 10:20

## 2023-09-11 NOTE — PROGRESS NOTES
soon as possible. We will arrange for chemotherapy for approval.  We will start him on T64 and folic acid in preparation for chemotherapy. Chemotherapy schedule and side effects were explained in details. The patient is agreeable. Considering that he has low volume disease and oligometastatic disease, he might be a candidate for consolidative radiation if he responds. However the final decision about treatment will depend on his molecular testing which is pending    Amarilis Guzmán MD  Hematologist/Medical [address] [facility] [address] hematology oncology physicians      I spent a total of 60 minutes on the date of the service which included preparing to see the patient, face-to-face patient care, completing clinical documentation, obtaining and/or reviewing separately obtained history, performing a medically appropriate examination, counseling and educating the patient/family/caregiver, ordering medications, tests, or procedures, communicating with other HCPs (not separately reported), independently interpreting results (not separately reported), communicating results to the patient/family/caregiver and care coordination (not separately reported).

## 2023-09-11 NOTE — PROGRESS NOTES
Patient arrived ambulatory per self for Vitamin B 12 injection after physician visit at front office. Patient complains of back pain. No other complaints or concerns. Vitamin B 12 injection given with band aide applied to site. Patient ambulated off unit per self at discharge.  Instructed to stop at  for AVS.

## 2023-09-11 NOTE — TELEPHONE ENCOUNTER
Desiree Klein MD VISIT  Plan  1- need mediport by IR   2- see orders for chemo  3- b12 injection today if possib;le  Rv once chemo is approved to start treatment   IR FOR PORT PLACEMENT IS ON 9/15/23 @ 9AM AT [address] ARRIVAL @ [address] [facility] 12 INJECTION WAS GIVEN TODAY ON EXIT  MD VISIT C1D1  AVS PRINTED W/ INSTRUCTIONS AND GIVEN TO PT ON EXIT

## 2023-09-12 ENCOUNTER — TELEPHONE (OUTPATIENT)
Dept: CASE MANAGEMENT | Age: 63
End: 2023-09-12

## 2023-09-12 ENCOUNTER — TELEPHONE (OUTPATIENT)
Dept: ONCOLOGY | Age: 63
End: 2023-09-12

## 2023-09-12 NOTE — TELEPHONE ENCOUNTER
Received a message from [facility] in navigation and patient  Patient needs a letter for work stating he cannot work due to treatment   Letter composed and faxed to [phone]

## 2023-09-15 ENCOUNTER — HOSPITAL ENCOUNTER (OUTPATIENT)
Dept: INTERVENTIONAL RADIOLOGY/VASCULAR | Age: 63
End: 2023-09-15
Payer: COMMERCIAL

## 2023-09-15 VITALS
WEIGHT: 162 LBS | DIASTOLIC BLOOD PRESSURE: 84 MMHG | BODY MASS INDEX: 20.14 KG/M2 | TEMPERATURE: 97.2 F | HEART RATE: 62 BPM | OXYGEN SATURATION: 97 % | SYSTOLIC BLOOD PRESSURE: 127 MMHG | RESPIRATION RATE: 14 BRPM | HEIGHT: 75 IN

## 2023-09-15 DIAGNOSIS — C34.92 MALIGNANT NEOPLASM OF LEFT LUNG, UNSPECIFIED PART OF LUNG (HCC): ICD-10-CM

## 2023-09-15 LAB
ERYTHROCYTE [DISTWIDTH] IN BLOOD BY AUTOMATED COUNT: 17.1 % (ref 11.8–14.4)
HCT VFR BLD AUTO: 40.3 % (ref 40.7–50.3)
HGB BLD-MCNC: 12.9 G/DL (ref 13–17)
INR PPP: 1.1
MCH RBC QN AUTO: 27.8 PG (ref 25.2–33.5)
MCHC RBC AUTO-ENTMCNC: 32 G/DL (ref 28.4–34.8)
MCV RBC AUTO: 86.9 FL (ref 82.6–102.9)
NRBC BLD-RTO: 0 PER 100 WBC
PLATELET # BLD AUTO: 298 K/UL (ref 138–453)
PMV BLD AUTO: 9.9 FL (ref 8.1–13.5)
POTASSIUM SERPL-SCNC: 4.3 MMOL/L (ref 3.7–5.3)
PROTHROMBIN TIME: 13.8 SEC (ref 11.5–14.2)
RBC # BLD AUTO: 4.64 M/UL (ref 4.21–5.77)
WBC OTHER # BLD: 11.5 K/UL (ref 3.5–11.3)

## 2023-09-15 PROCEDURE — 85610 PROTHROMBIN TIME: CPT

## 2023-09-15 PROCEDURE — 99152 MOD SED SAME PHYS/QHP 5/>YRS: CPT

## 2023-09-15 PROCEDURE — 6360000002 HC RX W HCPCS: Performed by: RADIOLOGY

## 2023-09-15 PROCEDURE — 76937 US GUIDE VASCULAR ACCESS: CPT

## 2023-09-15 PROCEDURE — 36561 INSERT TUNNELED CV CATH: CPT

## 2023-09-15 PROCEDURE — 99153 MOD SED SAME PHYS/QHP EA: CPT

## 2023-09-15 PROCEDURE — 7100000011 HC PHASE II RECOVERY - ADDTL 15 MIN

## 2023-09-15 PROCEDURE — 77001 FLUOROGUIDE FOR VEIN DEVICE: CPT

## 2023-09-15 PROCEDURE — 2580000003 HC RX 258: Performed by: RADIOLOGY

## 2023-09-15 PROCEDURE — C1788 PORT, INDWELLING, IMP: HCPCS

## 2023-09-15 PROCEDURE — 7100000010 HC PHASE II RECOVERY - FIRST 15 MIN

## 2023-09-15 PROCEDURE — 84132 ASSAY OF SERUM POTASSIUM: CPT

## 2023-09-15 PROCEDURE — 85027 COMPLETE CBC AUTOMATED: CPT

## 2023-09-15 RX ORDER — SODIUM CHLORIDE 0.9 % (FLUSH) 0.9 %
5-40 SYRINGE (ML) INJECTION EVERY 12 HOURS SCHEDULED
Status: DISCONTINUED | OUTPATIENT
Start: 2023-09-15 | End: 2023-09-18 | Stop reason: HOSPADM

## 2023-09-15 RX ORDER — FENTANYL CITRATE 50 UG/ML
INJECTION, SOLUTION INTRAMUSCULAR; INTRAVENOUS PRN
Status: COMPLETED | OUTPATIENT
Start: 2023-09-15 | End: 2023-09-15

## 2023-09-15 RX ORDER — SODIUM CHLORIDE 9 MG/ML
INJECTION, SOLUTION INTRAVENOUS PRN
Status: DISCONTINUED | OUTPATIENT
Start: 2023-09-15 | End: 2023-09-18 | Stop reason: HOSPADM

## 2023-09-15 RX ORDER — MIDAZOLAM HYDROCHLORIDE 1 MG/ML
INJECTION INTRAMUSCULAR; INTRAVENOUS PRN
Status: COMPLETED | OUTPATIENT
Start: 2023-09-15 | End: 2023-09-15

## 2023-09-15 RX ORDER — SODIUM CHLORIDE 0.9 % (FLUSH) 0.9 %
5-40 SYRINGE (ML) INJECTION PRN
Status: DISCONTINUED | OUTPATIENT
Start: 2023-09-15 | End: 2023-09-18 | Stop reason: HOSPADM

## 2023-09-15 RX ORDER — ACETAMINOPHEN 325 MG/1
650 TABLET ORAL EVERY 4 HOURS PRN
Status: DISCONTINUED | OUTPATIENT
Start: 2023-09-15 | End: 2023-09-18 | Stop reason: HOSPADM

## 2023-09-15 RX ORDER — HEPARIN SODIUM 1000 [USP'U]/ML
INJECTION, SOLUTION INTRAVENOUS; SUBCUTANEOUS PRN
Status: COMPLETED | OUTPATIENT
Start: 2023-09-15 | End: 2023-09-15

## 2023-09-15 RX ADMIN — MIDAZOLAM 1 MG: 1 INJECTION INTRAMUSCULAR; INTRAVENOUS at 10:00

## 2023-09-15 RX ADMIN — HEPARIN SODIUM 500 UNITS: 1000 INJECTION INTRAVENOUS; SUBCUTANEOUS at 10:04

## 2023-09-15 RX ADMIN — FENTANYL CITRATE 50 MCG: 50 INJECTION INTRAMUSCULAR; INTRAVENOUS at 09:49

## 2023-09-15 RX ADMIN — SODIUM CHLORIDE: 9 INJECTION, SOLUTION INTRAVENOUS at 08:44

## 2023-09-15 RX ADMIN — MIDAZOLAM 1 MG: 1 INJECTION INTRAMUSCULAR; INTRAVENOUS at 09:49

## 2023-09-15 RX ADMIN — Medication 2000 MG: at 09:22

## 2023-09-15 ASSESSMENT — PAIN - FUNCTIONAL ASSESSMENT: PAIN_FUNCTIONAL_ASSESSMENT: 0-10

## 2023-09-15 NOTE — PRE SEDATION
Sedation Pre-Procedure Note    Patient Name: Mahesh Anna   YOB: 1960  Room/Bed: Room/bed info not found  Medical Record Number: [id number]  Date: 9/15/2023   Time: 10:22 AM       Indication:  Lung cancer, need for chemoTx access    Consent: I have discussed with the patient and/or the patient representative the indication, alternatives, and the possible risks and/or complications of the planned procedure and the anesthesia methods. The patient and/or patient representative appear to understand and agree to proceed. Vital Signs:   Vitals:    09/15/23 1010   BP: 118/72   Pulse: 62   Resp: 13   Temp:    SpO2: 100%       Past Medical History:   has a past medical history of Cancer ([address]), History of DVT (deep vein thrombosis), Hx of blood clots, Snores, and Thyroid disease. Past Surgical History:   has a past surgical history that includes cyst removal (2010); Dental surgery; and US BIOPSY LYMPH NODE (8/15/2023). Medications:   Scheduled Meds:    sodium chloride flush  5-40 mL IntraVENous 2 times per day    cyanocobalamin  1,000 mcg IntraMUSCular Once     Continuous Infusions:    sodium chloride 15 mL/hr at 09/15/23 0844     PRN Meds: sodium chloride flush, sodium chloride  Home Meds:   Prior to Admission medications    Medication Sig Start Date End Date Taking? Authorizing Provider   folic acid (FOLVITE) 1 MG tablet Take 1 tablet by mouth daily 9/11/23   Halley Guzmán MD   omeprazole (PRILOSEC OTC) 20 MG tablet Take 1 tablet by mouth daily 9/11/23   Halley Guzmán MD   ondansetron (ZOFRAN) 8 MG tablet Take 1 tablet by mouth every 8 hours as needed for Nausea or Vomiting Can take it sublingually if needed 9/11/23   Renay Finch MD   lidocaine-prilocaine (EMLA) 2.5-2.5 % cream Apply topically Daily as needed.  9/11/23   Mamie Guzmán MD   dexamethasone (DECADRON) 4 MG tablet Take 1 tablet daily 9/11/23   Renay Finch MD   buPROPion (WELLBUTRIN XL) 150 MG extended

## 2023-09-15 NOTE — POST SEDATION
Sedation Post Procedure Note    Patient Name: Stevenson Mallory   YOB: 1960  Room/Bed: Room/bed info not found  Medical Record Number: [id number]  Date: 9/15/2023   Time: 10:26 AM         Physicians/Assistants: Ke Martínez MD, MD    Procedure Performed:  Port placement    Post-Sedation Vital Signs:  Vitals:    09/15/23 1010   BP: 118/72   Pulse: 62   Resp: 13   Temp:    SpO2: 100%      Vital signs were reviewed and were stable after the procedure (see flow sheet for vitals)            Post-Sedation Exam: Responding appropriately, breathing spontaneously           Complications: none    Electronically signed by Ke Martínez MD on 9/15/2023 at 10:26 AM

## 2023-09-18 ENCOUNTER — TELEPHONE (OUTPATIENT)
Dept: INFUSION THERAPY | Facility: MEDICAL CENTER | Age: 63
End: 2023-09-18

## 2023-09-18 NOTE — TELEPHONE ENCOUNTER
New chemotherapy orders per Dr. Cristiana Naranjo      Surgical pathology 8/15/23 Metastatic lung adenocarcinoma      21 day cycle Keytruda,Alimta,Paraplatin      Ht - 190.5 cm  Wt - 75.1 kg  BSA - 1.99      Keytruda 200 mg IV day 1   Alimta 500 mg/m2 = 995 mg IV day 1  Paraplatin AUC 5 IV day 1 Cycle 1 - 4      Labs to epic. Chart to front office for processing and scheduling. Note to pool for 2nd RN check.

## 2023-09-20 ENCOUNTER — TELEPHONE (OUTPATIENT)
Dept: CASE MANAGEMENT | Age: 63
End: 2023-09-20

## 2023-09-20 ENCOUNTER — TELEPHONE (OUTPATIENT)
Dept: INFUSION THERAPY | Facility: MEDICAL CENTER | Age: 63
End: 2023-09-20

## 2023-09-20 NOTE — TELEPHONE ENCOUNTER
Pt calling to ask about 4 mg dexamethasone pill. States was able to dissolve prednisone 20 mg tab that had been given to him previously per Dr Shahrzad Bell. (Usha Astudillo had called back pt and discussing per phone). Pt thought dex 4 mg tablet, was ER, but spoke with pharmacist, Sharee Dunbar and verified not ER, and OK to crush and put in applesauce or something, in case has bitter taste after crushed. Pt states understanding and also is due to difficulty swallowing, r/t cervical swelling to neck area and asking if approved yet to start chemo, Sharee Dunbar reviewed chart and no approval received yet and states will check with Caitlyn Wadsworth, due to pt req needs to start chemo. Notified pt hope to hear soon from precert. Wife also called about above issues at [phone], spoke with pt at 2028.

## 2023-09-20 NOTE — TELEPHONE ENCOUNTER
Name: Burns Cushing  : 1960  MRN: [id number]    Oncology Navigation Follow-Up Note  Navigator spoke with Debbie Weston regarding precert and Malcom Campbell lead on case for status update.     Electronically signed by Scott Torres RN on 2023 at 1:58 PM

## 2023-09-22 ENCOUNTER — TELEPHONE (OUTPATIENT)
Dept: CASE MANAGEMENT | Age: 63
End: 2023-09-22

## 2023-09-22 NOTE — TELEPHONE ENCOUNTER
Name: Lopez Frederick  : 1960  MRN: [id number]    Oncology Navigation Follow-Up Note  Navigator received call from [address] and SA received call from [facility] requesting records? Ke Ferro RN reaching out to navigator with questions. Navigator unaware of why [facility] would be reaching out for records and will reach out to pt. And spouse  1:00 -Writer spoke with spouse and concerned that [facility] is requesting records? Spouse voiced, \"I  talked with insurance and  they never received request for authorization until recently and spouse concerned why so much time had transpired for request to come from [facility]? Writer responding, \"I dont have an answer as to why processing may have taken this long, but Tx is authorized now and Maria E Faust will have  CC reach out to get pt. Scheduled. Spouse had previously reached out to [facility] for Oncology Care d/t delay in authorization to be completed. Writer sharing with spouse we cannot release records randomly due to PHI/HIPAA guidelines, however if wanting a referral/2nd opinion would be happy to discuss with Dr. Viviana Herrera if needed.   Electronically signed by Sunita Rinaldi RN on 2023 at 2:08 PM

## 2023-09-24 DIAGNOSIS — E05.90 HYPERTHYROIDISM: ICD-10-CM

## 2023-09-25 ENCOUNTER — HOSPITAL ENCOUNTER (OUTPATIENT)
Facility: MEDICAL CENTER | Age: 63
End: 2023-09-25
Payer: COMMERCIAL

## 2023-09-25 ENCOUNTER — HOSPITAL ENCOUNTER (OUTPATIENT)
Dept: INFUSION THERAPY | Facility: MEDICAL CENTER | Age: 63
Discharge: HOME | End: 2023-09-25
Payer: COMMERCIAL

## 2023-09-25 ENCOUNTER — TELEPHONE (OUTPATIENT)
Dept: INFUSION THERAPY | Facility: MEDICAL CENTER | Age: 63
End: 2023-09-25

## 2023-09-25 ENCOUNTER — OFFICE VISIT (OUTPATIENT)
Dept: ONCOLOGY | Age: 63
End: 2023-09-25
Payer: COMMERCIAL

## 2023-09-25 ENCOUNTER — TELEPHONE (OUTPATIENT)
Dept: CASE MANAGEMENT | Age: 63
End: 2023-09-25

## 2023-09-25 ENCOUNTER — TELEPHONE (OUTPATIENT)
Dept: ONCOLOGY | Age: 63
End: 2023-09-25

## 2023-09-25 VITALS
DIASTOLIC BLOOD PRESSURE: 81 MMHG | HEART RATE: 74 BPM | TEMPERATURE: 98.1 F | SYSTOLIC BLOOD PRESSURE: 123 MMHG | WEIGHT: 156.1 LBS | RESPIRATION RATE: 18 BRPM | BODY MASS INDEX: 19.51 KG/M2

## 2023-09-25 DIAGNOSIS — E03.8 OTHER SPECIFIED HYPOTHYROIDISM: ICD-10-CM

## 2023-09-25 DIAGNOSIS — C77.0 METASTASIS TO CERVICAL LYMPH NODE (HCC): ICD-10-CM

## 2023-09-25 DIAGNOSIS — E07.9 THYROID MASS: ICD-10-CM

## 2023-09-25 DIAGNOSIS — C77.1 METASTASIS TO MEDIASTINAL LYMPH NODE (HCC): ICD-10-CM

## 2023-09-25 DIAGNOSIS — C34.90 MALIGNANT NEOPLASM OF LUNG, UNSPECIFIED LATERALITY, UNSPECIFIED PART OF LUNG (HCC): ICD-10-CM

## 2023-09-25 DIAGNOSIS — C34.11 MALIGNANT NEOPLASM OF UPPER LOBE OF RIGHT LUNG (HCC): Primary | ICD-10-CM

## 2023-09-25 DIAGNOSIS — Z79.899 HIGH RISK MEDICATIONS (NOT ANTICOAGULANTS) LONG-TERM USE: ICD-10-CM

## 2023-09-25 LAB
ALBUMIN SERPL-MCNC: 4.2 G/DL (ref 3.5–5.2)
ALP SERPL-CCNC: 81 U/L (ref 40–129)
ALT SERPL-CCNC: 38 U/L (ref 5–41)
ANION GAP SERPL CALCULATED.3IONS-SCNC: 10 MMOL/L (ref 9–17)
AST SERPL-CCNC: 24 U/L
BASOPHILS # BLD: 0.04 K/UL (ref 0–0.2)
BASOPHILS NFR BLD: 0 % (ref 0–2)
BILIRUB SERPL-MCNC: 0.5 MG/DL (ref 0.3–1.2)
BUN SERPL-MCNC: 32 MG/DL (ref 8–23)
BUN/CREAT SERPL: 36 (ref 9–20)
CALCIUM SERPL-MCNC: 9.7 MG/DL (ref 8.6–10.4)
CHLORIDE SERPL-SCNC: 101 MMOL/L (ref 98–107)
CHOLEST SERPL-MCNC: 290 MG/DL (ref 0–199)
CHOLESTEROL/HDL RATIO: 4
CO2 SERPL-SCNC: 28 MMOL/L (ref 20–31)
CORTIS SERPL-MCNC: 4.1 UG/DL (ref 2.5–19.5)
CREAT SERPL-MCNC: 0.9 MG/DL (ref 0.7–1.2)
EOSINOPHIL # BLD: 0.05 K/UL (ref 0–0.44)
EOSINOPHILS RELATIVE PERCENT: 0 % (ref 1–4)
ERYTHROCYTE [DISTWIDTH] IN BLOOD BY AUTOMATED COUNT: 18.7 % (ref 11.8–14.4)
GFR SERPL CREATININE-BSD FRML MDRD: >60 ML/MIN/1.73M2
GLUCOSE SERPL-MCNC: 112 MG/DL (ref 70–99)
HCT VFR BLD AUTO: 45 % (ref 40.7–50.3)
HDLC SERPL-MCNC: 76 MG/DL (ref 0–40)
HGB BLD-MCNC: 14.3 G/DL (ref 13–17)
IMM GRANULOCYTES # BLD AUTO: 0.08 K/UL (ref 0–0.3)
IMM GRANULOCYTES NFR BLD: 1 %
LDLC SERPL CALC-MCNC: 183 MG/DL (ref 0–100)
LYMPHOCYTES NFR BLD: 1.22 K/UL (ref 1.1–3.7)
LYMPHOCYTES RELATIVE PERCENT: 7 % (ref 24–43)
MCH RBC QN AUTO: 27.6 PG (ref 25.2–33.5)
MCHC RBC AUTO-ENTMCNC: 31.8 G/DL (ref 28.4–34.8)
MCV RBC AUTO: 86.7 FL (ref 82.6–102.9)
MONOCYTES NFR BLD: 0.77 K/UL (ref 0.1–1.2)
MONOCYTES NFR BLD: 4 % (ref 3–12)
NEUTROPHILS NFR BLD: 88 % (ref 36–65)
NEUTS SEG NFR BLD: 15.39 K/UL (ref 1.5–8.1)
NRBC BLD-RTO: 0 PER 100 WBC
PLATELET # BLD AUTO: 360 K/UL (ref 138–453)
PMV BLD AUTO: 10.2 FL (ref 8.1–13.5)
POTASSIUM SERPL-SCNC: 4.5 MMOL/L (ref 3.7–5.3)
PROT SERPL-MCNC: 7.3 G/DL (ref 6.4–8.3)
RBC # BLD AUTO: 5.19 M/UL (ref 4.21–5.77)
RBC # BLD: ABNORMAL 10*6/UL
SODIUM SERPL-SCNC: 139 MMOL/L (ref 135–144)
T3 SERPL-MCNC: <20 NG/DL (ref 80–200)
T4 SERPL-MCNC: 1.1 UG/DL (ref 4.5–11.7)
TRIGL SERPL-MCNC: 154 MG/DL (ref 0–149)
TSH SERPL DL<=0.05 MIU/L-ACNC: 63.65 UIU/ML (ref 0.3–5)
VLDLC SERPL CALC-MCNC: 31 MG/DL
WBC OTHER # BLD: 17.6 K/UL (ref 3.5–11.3)

## 2023-09-25 PROCEDURE — 96411 CHEMO IV PUSH ADDL DRUG: CPT

## 2023-09-25 PROCEDURE — 84480 ASSAY TRIIODOTHYRONINE (T3): CPT

## 2023-09-25 PROCEDURE — 99215 OFFICE O/P EST HI 40 MIN: CPT | Performed by: INTERNAL MEDICINE

## 2023-09-25 PROCEDURE — 80061 LIPID PANEL: CPT

## 2023-09-25 PROCEDURE — 84443 ASSAY THYROID STIM HORMONE: CPT

## 2023-09-25 PROCEDURE — 80053 COMPREHEN METABOLIC PANEL: CPT

## 2023-09-25 PROCEDURE — 96417 CHEMO IV INFUS EACH ADDL SEQ: CPT

## 2023-09-25 PROCEDURE — 2580000003 HC RX 258: Performed by: INTERNAL MEDICINE

## 2023-09-25 PROCEDURE — 36591 DRAW BLOOD OFF VENOUS DEVICE: CPT

## 2023-09-25 PROCEDURE — 82533 TOTAL CORTISOL: CPT

## 2023-09-25 PROCEDURE — 96413 CHEMO IV INFUSION 1 HR: CPT

## 2023-09-25 PROCEDURE — 85025 COMPLETE CBC W/AUTO DIFF WBC: CPT

## 2023-09-25 PROCEDURE — 96375 TX/PRO/DX INJ NEW DRUG ADDON: CPT

## 2023-09-25 PROCEDURE — 99211 OFF/OP EST MAY X REQ PHY/QHP: CPT | Performed by: INTERNAL MEDICINE

## 2023-09-25 PROCEDURE — 6360000002 HC RX W HCPCS: Performed by: INTERNAL MEDICINE

## 2023-09-25 PROCEDURE — 84436 ASSAY OF TOTAL THYROXINE: CPT

## 2023-09-25 PROCEDURE — 96367 TX/PROPH/DG ADDL SEQ IV INF: CPT

## 2023-09-25 RX ORDER — PALONOSETRON 0.05 MG/ML
0.25 INJECTION, SOLUTION INTRAVENOUS ONCE
Status: COMPLETED | OUTPATIENT
Start: 2023-09-25 | End: 2023-09-25

## 2023-09-25 RX ORDER — METHIMAZOLE 5 MG/1
5 TABLET ORAL DAILY
Qty: 30 TABLET | Refills: 1 | Status: SHIPPED | OUTPATIENT
Start: 2023-09-25

## 2023-09-25 RX ORDER — DEXAMETHASONE SODIUM PHOSPHATE 10 MG/ML
10 INJECTION INTRAMUSCULAR; INTRAVENOUS ONCE
Status: COMPLETED | OUTPATIENT
Start: 2023-09-25 | End: 2023-09-25

## 2023-09-25 RX ORDER — LEVOTHYROXINE SODIUM 0.05 MG/1
50 TABLET ORAL DAILY
Qty: 30 TABLET | Refills: 5 | Status: SHIPPED | OUTPATIENT
Start: 2023-09-25

## 2023-09-25 RX ORDER — SODIUM CHLORIDE 9 MG/ML
5-250 INJECTION, SOLUTION INTRAVENOUS PRN
Status: DISCONTINUED | OUTPATIENT
Start: 2023-09-25 | End: 2023-09-26 | Stop reason: HOSPADM

## 2023-09-25 RX ORDER — HEPARIN 100 UNIT/ML
500 SYRINGE INTRAVENOUS PRN
Status: DISCONTINUED | OUTPATIENT
Start: 2023-09-25 | End: 2023-09-26 | Stop reason: HOSPADM

## 2023-09-25 RX ORDER — SODIUM CHLORIDE 0.9 % (FLUSH) 0.9 %
5-40 SYRINGE (ML) INJECTION PRN
Status: DISCONTINUED | OUTPATIENT
Start: 2023-09-25 | End: 2023-09-26 | Stop reason: HOSPADM

## 2023-09-25 RX ADMIN — PALONOSETRON 0.25 MG: 0.05 INJECTION, SOLUTION INTRAVENOUS at 11:50

## 2023-09-25 RX ADMIN — FOSAPREPITANT 150 MG: 150 INJECTION, POWDER, LYOPHILIZED, FOR SOLUTION INTRAVENOUS at 12:03

## 2023-09-25 RX ADMIN — SODIUM CHLORIDE, PRESERVATIVE FREE 30 ML: 5 INJECTION INTRAVENOUS at 09:45

## 2023-09-25 RX ADMIN — DEXAMETHASONE SODIUM PHOSPHATE 10 MG: 10 INJECTION INTRAMUSCULAR; INTRAVENOUS at 12:02

## 2023-09-25 RX ADMIN — CARBOPLATIN 550 MG: 600 INJECTION, SOLUTION INTRAVENOUS at 12:33

## 2023-09-25 RX ADMIN — SODIUM CHLORIDE 50 ML/HR: 9 INJECTION, SOLUTION INTRAVENOUS at 10:00

## 2023-09-25 RX ADMIN — PEMETREXED DISODIUM 1000 MG: 500 INJECTION, POWDER, LYOPHILIZED, FOR SOLUTION INTRAVENOUS at 13:28

## 2023-09-25 RX ADMIN — SODIUM CHLORIDE 200 MG: 9 INJECTION, SOLUTION INTRAVENOUS at 12:47

## 2023-09-25 NOTE — TELEPHONE ENCOUNTER
FMLA for [employer] faxed to (f) [phone] with md note with confirmation, forms to front office to scan.

## 2023-09-25 NOTE — TELEPHONE ENCOUNTER
Name: Maday Antoine  : 1960  MRN: [id number]    Oncology Navigation Follow-Up Note  Navigator met with pt. And spouse face to face. Navigation packet given to pt. And writer offering available resources. Pt. Asked about smoking? Pt. Responded, \"I'm quitting, I'm on wellbutrin\" Pt. Refusing smoking cessation referral , however information shared with pt. Smoking cessation assistance and resources provided. C-1 D-1 today and writer will continue to follow.    Electronically signed by Isabelle Mello RN on 2023 at 10:48 AM

## 2023-09-25 NOTE — PROGRESS NOTES
process. 2.  Relatively diffuse cervical lymphadenopathy, right greater than left, as  well as mediastinal and small right hilar lymph nodes demonstrate abnormal  metabolic activity concerning for neoplastic involvement. IMPRESSION:   Clinical diagnosis of metastatic lung cancer, clinical stage is T2 N2 M1  No evidence of metastatic disease outside of the mediastinum and cervical lymph nodes  Significant dysphagia and change in voice secondary to the tumor in the neck  Plan systemic chemotherapy with carboplatin, Alimta and Keytruda  Molecular testing is negative for targetable mutation  Initially was diagnosed with hyperthyroidism but now his TSH is up. I think he is developing hypothyroidism. PLAN:   I had a very long discussion with the patient he went through the chemotherapy education class today. All chemotherapy schedule issues and side effects were explained in detail  I am very concerned about his worsening thyroid swelling and elevated TSH. I suspect that he has hypothyroidism. We will order T4 and T3 today and tentatively start him on Synthroid  We will watch him very closely over the next few weeks. I am very concerned about his worsening Symptoms. If there is any further compromise of his swallowing or airways, we have to intervene with either radiation or even tracheostomy. Patient is at extreme high risk for complications and toxicity . We will watch him very closely over the next few weeks.     Jeremie Guzmán MD  Hematologist/Medical Oncologist  [facility]

## 2023-09-25 NOTE — PROGRESS NOTES
Also see other appt,  Notified to call for temp 100.4  or greater. Pt has thermometer and notified to drink 6-8 bottles of water per day as tolerated, due to pt has cervical lymph node swelling and also states has trouble swallowing, with choking and reflux also. Notified to discuss with Dr Donovan Matias during appt and wife and pt state will talk with md.  TSH and other lab results discussed with md and pt. Add T3, T4 today, more blood sent to be sure enough sent, due to will start [place] soon. Travis discussed at 7-10 days past chemo administration. Discussed differences between immunotherapy and chemo and chemo affects bone marrow, hair follicles and GI tract. Discussed mouth care and hydration and signs of infection, fatigue, hair loss, which can be permanent and if has immediate needs to call office and ask for nurse, also given phone numbers and after hours number and discussed to refer to [facility] forms , [facility] hearing, vision changes, nausea, vomiting, fatigue, low WBC, RBC, PLT, discussed port and care of port and appts, and schedule, cycle Q 3 weeks, pt aware of diagnosis, pyschosocial form and dietary form and financial concerns needs, teaching form all completed. Also premeds discussed and kidney effects, and skin and lung and colon, r/t keytruda (any organ can be affected with [place] and to report if severe and treatment steroids.

## 2023-09-25 NOTE — PROGRESS NOTES
Pt arrives per amb with wife and see chemo ed notes also. NS flushing line before and after premeds and keytruda and chemo. Pt seen prior per md and labs and orders reviewed and OK to proceed. TSH elevated and shown to md and chemo consent to rack for signing. T3 and T4 sent, sent more blood prior to starting [place] or any chemo at md michael, in case lab did not have enough, after speaking with Yaima Fitch in lab. Pt tolerated all chemo well and no reactions or complaints and blood return present throughout infusions. Pt given next appt per AVS from  and pt discharged per amb with wife.

## 2023-09-25 NOTE — TELEPHONE ENCOUNTER
Last visit: 08/28/2023  Last Med refill: 09/02/2023  Does patient have enough medication for 72 hours: yes    Next Visit Date:  Future Appointments   Date Time Provider [address]   9/25/2023  9:30 AM STV STA CHAIR 02 [facility] STA MED [place]   9/25/2023 10:15 AM Zulema Guzmán MD [email or web address]   9/25/2023 11:30 AM STV STA CHAIR 06 [facility] STA MED [place]   10/16/2023  9:00 AM STV STA CHAIR 07 ST[facility] STA MED [place]   11/28/2023  4:15 PM Keisha Winters MD [address] Maintenance   Topic Date Due    COVID-19 Vaccine (1) Never done    Pneumococcal 0-64 years Vaccine (1 - PCV) Never done    DTaP/Tdap/Td vaccine (1 - Tdap) Never done    Shingles vaccine (1 of 2) Never done    Flu vaccine (1) Never done    Depression Screen  07/24/2024    Hepatitis A vaccine  Aged Out    Hepatitis B vaccine  Aged Out    Hib vaccine  Aged Out    Meningococcal (ACWY) vaccine  Aged Out    Lipids  Discontinued    Prostate Specific Antigen (PSA) Screening or Monitoring  Discontinued       Hemoglobin A1C (%)   Date Value   07/25/2023 5.6             ( goal A1C is < 7)   No components found for: \"LABMICR\"  LDL Cholesterol (mg/dL)   Date Value   07/25/2023 88       (goal LDL is <100)   AST (U/L)   Date Value   07/25/2023 20     ALT (U/L)   Date Value   07/25/2023 11     BUN (mg/dL)   Date Value   07/31/2023 23     BP Readings from Last 3 Encounters:   09/15/23 127/84   09/11/23 132/80   08/28/23 124/78          (goal 120/80)    All Future Testing planned in [facility]  Lab Frequency Next Occurrence   CT Lung Screen (Annual/Baseline) Once 07/24/2023   TSH With Reflex Ft4 Once 08/16/2023   CBC With Auto Differential Once 09/25/2023   Comprehensive metabolic panel Once 05/07/8637   TSH without Reflex Once 09/25/2023   CORTISOL Once 09/25/2023   HEPATITIS B SURFACE ANTIGEN Once 09/25/2023   Hepatitis B core antibody, total Once 09/25/2023   Hepatitis B surface antibody Once 09/25/2023   CBC with Auto

## 2023-09-25 NOTE — TELEPHONE ENCOUNTER
Fide Suresh MD VISIT & TX  DR German Rosales IN TO SEE PATIENT  ORDERS RECEIVED  START CHEMO PER ORDERS  RV 3 WEEKS WITH CHEMO & LABS  MD VISIT 10/16/23 @9:30AM [email or web address]  AVS PRINTED AND GIVEN TO PATIENT WITH INSTRUCTIONS  PATIENT REMAINS IN [address] [facility]

## 2023-10-03 ENCOUNTER — TELEPHONE (OUTPATIENT)
Dept: ONCOLOGY | Age: 63
End: 2023-10-03

## 2023-10-03 DIAGNOSIS — C77.1 METASTASIS TO MEDIASTINAL LYMPH NODE (HCC): ICD-10-CM

## 2023-10-03 DIAGNOSIS — C34.11 MALIGNANT NEOPLASM OF UPPER LOBE OF RIGHT LUNG (HCC): Primary | ICD-10-CM

## 2023-10-03 NOTE — TELEPHONE ENCOUNTER
10-2-23  Pt called and would like a referral to [facility]  Referral faxed to [phone], confirmation received

## 2023-10-11 ENCOUNTER — TELEPHONE (OUTPATIENT)
Dept: INFUSION THERAPY | Facility: MEDICAL CENTER | Age: 63
End: 2023-10-11

## 2023-10-11 ENCOUNTER — HOSPITAL ENCOUNTER (OUTPATIENT)
Facility: MEDICAL CENTER | Age: 63
End: 2023-10-11
Payer: COMMERCIAL

## 2023-10-16 ENCOUNTER — OFFICE VISIT (OUTPATIENT)
Dept: ONCOLOGY | Age: 63
End: 2023-10-16
Payer: COMMERCIAL

## 2023-10-16 ENCOUNTER — HOSPITAL ENCOUNTER (OUTPATIENT)
Dept: INFUSION THERAPY | Facility: MEDICAL CENTER | Age: 63
Discharge: HOME | End: 2023-10-16
Payer: COMMERCIAL

## 2023-10-16 ENCOUNTER — TELEPHONE (OUTPATIENT)
Dept: ONCOLOGY | Age: 63
End: 2023-10-16

## 2023-10-16 VITALS
HEART RATE: 85 BPM | BODY MASS INDEX: 20.37 KG/M2 | DIASTOLIC BLOOD PRESSURE: 82 MMHG | WEIGHT: 163 LBS | SYSTOLIC BLOOD PRESSURE: 132 MMHG | TEMPERATURE: 98.3 F

## 2023-10-16 DIAGNOSIS — Z79.899 HIGH RISK MEDICATIONS (NOT ANTICOAGULANTS) LONG-TERM USE: ICD-10-CM

## 2023-10-16 DIAGNOSIS — C34.11 MALIGNANT NEOPLASM OF UPPER LOBE OF RIGHT LUNG (HCC): ICD-10-CM

## 2023-10-16 DIAGNOSIS — I82.461 ACUTE DEEP VEIN THROMBOSIS (DVT) OF CALF MUSCLE VEIN OF RIGHT LOWER EXTREMITY (HCC): Primary | ICD-10-CM

## 2023-10-16 DIAGNOSIS — C77.0 METASTASIS TO CERVICAL LYMPH NODE (HCC): ICD-10-CM

## 2023-10-16 DIAGNOSIS — C34.90 MALIGNANT NEOPLASM OF LUNG, UNSPECIFIED LATERALITY, UNSPECIFIED PART OF LUNG (HCC): Primary | ICD-10-CM

## 2023-10-16 DIAGNOSIS — C77.1 METASTASIS TO MEDIASTINAL LYMPH NODE (HCC): ICD-10-CM

## 2023-10-16 DIAGNOSIS — C34.90 MALIGNANT NEOPLASM OF LUNG, UNSPECIFIED LATERALITY, UNSPECIFIED PART OF LUNG (HCC): ICD-10-CM

## 2023-10-16 LAB
ALBUMIN SERPL-MCNC: 4 G/DL (ref 3.5–5.2)
ALP SERPL-CCNC: 68 U/L (ref 40–129)
ALT SERPL-CCNC: 34 U/L (ref 5–41)
ANION GAP SERPL CALCULATED.3IONS-SCNC: 10 MMOL/L (ref 9–17)
AST SERPL-CCNC: 22 U/L
BASOPHILS # BLD: 0.03 K/UL (ref 0–0.2)
BASOPHILS NFR BLD: 1 % (ref 0–2)
BILIRUB SERPL-MCNC: 0.3 MG/DL (ref 0.3–1.2)
BUN SERPL-MCNC: 17 MG/DL (ref 8–23)
BUN/CREAT SERPL: 21 (ref 9–20)
CALCIUM SERPL-MCNC: 9.2 MG/DL (ref 8.6–10.4)
CHLORIDE SERPL-SCNC: 101 MMOL/L (ref 98–107)
CHOLEST SERPL-MCNC: 262 MG/DL
CHOLESTEROL/HDL RATIO: 3.7
CO2 SERPL-SCNC: 27 MMOL/L (ref 20–31)
CORTIS SERPL-MCNC: 6.2 UG/DL (ref 2.7–18.4)
CREAT SERPL-MCNC: 0.8 MG/DL (ref 0.7–1.2)
EOSINOPHIL # BLD: 0.08 K/UL (ref 0–0.44)
EOSINOPHILS RELATIVE PERCENT: 2 % (ref 1–4)
ERYTHROCYTE [DISTWIDTH] IN BLOOD BY AUTOMATED COUNT: 21.4 % (ref 11.8–14.4)
GFR SERPL CREATININE-BSD FRML MDRD: >60 ML/MIN/1.73M2
GLUCOSE SERPL-MCNC: 101 MG/DL (ref 70–99)
HCT VFR BLD AUTO: 38.8 % (ref 40.7–50.3)
HDLC SERPL-MCNC: 71 MG/DL
HGB BLD-MCNC: 12.6 G/DL (ref 13–17)
IMM GRANULOCYTES # BLD AUTO: 0.04 K/UL (ref 0–0.3)
IMM GRANULOCYTES NFR BLD: 1 %
LDLC SERPL CALC-MCNC: 170 MG/DL (ref 0–130)
LYMPHOCYTES NFR BLD: 1.28 K/UL (ref 1.1–3.7)
LYMPHOCYTES RELATIVE PERCENT: 25 % (ref 24–43)
MCH RBC QN AUTO: 28.7 PG (ref 25.2–33.5)
MCHC RBC AUTO-ENTMCNC: 32.5 G/DL (ref 28.4–34.8)
MCV RBC AUTO: 88.4 FL (ref 82.6–102.9)
MONOCYTES NFR BLD: 0.73 K/UL (ref 0.1–1.2)
MONOCYTES NFR BLD: 15 % (ref 3–12)
NEUTROPHILS NFR BLD: 57 % (ref 36–65)
NEUTS SEG NFR BLD: 2.88 K/UL (ref 1.5–8.1)
NRBC BLD-RTO: 0 PER 100 WBC
PLATELET # BLD AUTO: 386 K/UL (ref 138–453)
PMV BLD AUTO: 8.8 FL (ref 8.1–13.5)
POTASSIUM SERPL-SCNC: 4.6 MMOL/L (ref 3.7–5.3)
PROT SERPL-MCNC: 6.9 G/DL (ref 6.4–8.3)
RBC # BLD AUTO: 4.39 M/UL (ref 4.21–5.77)
RBC # BLD: ABNORMAL 10*6/UL
SODIUM SERPL-SCNC: 138 MMOL/L (ref 135–144)
TRIGL SERPL-MCNC: 104 MG/DL
TSH SERPL DL<=0.05 MIU/L-ACNC: 39.98 UIU/ML (ref 0.3–5)
WBC OTHER # BLD: 5 K/UL (ref 3.5–11.3)

## 2023-10-16 PROCEDURE — 96413 CHEMO IV INFUSION 1 HR: CPT

## 2023-10-16 PROCEDURE — 2580000003 HC RX 258: Performed by: INTERNAL MEDICINE

## 2023-10-16 PROCEDURE — 6360000002 HC RX W HCPCS: Performed by: INTERNAL MEDICINE

## 2023-10-16 PROCEDURE — 96375 TX/PRO/DX INJ NEW DRUG ADDON: CPT

## 2023-10-16 PROCEDURE — 99214 OFFICE O/P EST MOD 30 MIN: CPT | Performed by: INTERNAL MEDICINE

## 2023-10-16 PROCEDURE — 80061 LIPID PANEL: CPT

## 2023-10-16 PROCEDURE — 84443 ASSAY THYROID STIM HORMONE: CPT

## 2023-10-16 PROCEDURE — 96376 TX/PRO/DX INJ SAME DRUG ADON: CPT

## 2023-10-16 PROCEDURE — 80053 COMPREHEN METABOLIC PANEL: CPT

## 2023-10-16 PROCEDURE — 96411 CHEMO IV PUSH ADDL DRUG: CPT

## 2023-10-16 PROCEDURE — 96367 TX/PROPH/DG ADDL SEQ IV INF: CPT

## 2023-10-16 PROCEDURE — 99211 OFF/OP EST MAY X REQ PHY/QHP: CPT | Performed by: INTERNAL MEDICINE

## 2023-10-16 PROCEDURE — 82533 TOTAL CORTISOL: CPT

## 2023-10-16 PROCEDURE — 96417 CHEMO IV INFUS EACH ADDL SEQ: CPT

## 2023-10-16 PROCEDURE — 36591 DRAW BLOOD OFF VENOUS DEVICE: CPT

## 2023-10-16 PROCEDURE — 85025 COMPLETE CBC W/AUTO DIFF WBC: CPT

## 2023-10-16 RX ORDER — SODIUM CHLORIDE 9 MG/ML
5-250 INJECTION, SOLUTION INTRAVENOUS PRN
Status: CANCELLED | OUTPATIENT
Start: 2023-10-16

## 2023-10-16 RX ORDER — SODIUM CHLORIDE 9 MG/ML
5-250 INJECTION, SOLUTION INTRAVENOUS PRN
Status: DISCONTINUED | OUTPATIENT
Start: 2023-10-16 | End: 2023-10-17 | Stop reason: HOSPADM

## 2023-10-16 RX ORDER — PALONOSETRON 0.05 MG/ML
0.25 INJECTION, SOLUTION INTRAVENOUS ONCE
Status: COMPLETED | OUTPATIENT
Start: 2023-10-16 | End: 2023-10-16

## 2023-10-16 RX ORDER — FAMOTIDINE 10 MG/ML
20 INJECTION, SOLUTION INTRAVENOUS
Status: CANCELLED | OUTPATIENT
Start: 2023-10-16

## 2023-10-16 RX ORDER — DEXAMETHASONE SODIUM PHOSPHATE 10 MG/ML
10 INJECTION INTRAMUSCULAR; INTRAVENOUS ONCE
Status: COMPLETED | OUTPATIENT
Start: 2023-10-16 | End: 2023-10-16

## 2023-10-16 RX ORDER — HEPARIN SODIUM (PORCINE) LOCK FLUSH IV SOLN 100 UNIT/ML 100 UNIT/ML
500 SOLUTION INTRAVENOUS PRN
Status: CANCELLED | OUTPATIENT
Start: 2023-10-16

## 2023-10-16 RX ORDER — SODIUM CHLORIDE 0.9 % (FLUSH) 0.9 %
5-40 SYRINGE (ML) INJECTION PRN
Status: CANCELLED | OUTPATIENT
Start: 2023-10-16

## 2023-10-16 RX ORDER — DIPHENHYDRAMINE HYDROCHLORIDE 50 MG/ML
50 INJECTION INTRAMUSCULAR; INTRAVENOUS
Status: CANCELLED | OUTPATIENT
Start: 2023-10-16

## 2023-10-16 RX ORDER — PALONOSETRON 0.05 MG/ML
0.25 INJECTION, SOLUTION INTRAVENOUS ONCE
Status: CANCELLED | OUTPATIENT
Start: 2023-10-16 | End: 2023-10-16

## 2023-10-16 RX ORDER — SODIUM CHLORIDE 0.9 % (FLUSH) 0.9 %
5-40 SYRINGE (ML) INJECTION PRN
Status: DISCONTINUED | OUTPATIENT
Start: 2023-10-16 | End: 2023-10-17 | Stop reason: HOSPADM

## 2023-10-16 RX ORDER — ONDANSETRON 2 MG/ML
8 INJECTION INTRAMUSCULAR; INTRAVENOUS
Status: CANCELLED | OUTPATIENT
Start: 2023-10-16

## 2023-10-16 RX ORDER — SODIUM CHLORIDE 9 MG/ML
INJECTION, SOLUTION INTRAVENOUS CONTINUOUS
Status: CANCELLED | OUTPATIENT
Start: 2023-10-16

## 2023-10-16 RX ORDER — ALBUTEROL SULFATE 90 UG/1
4 AEROSOL, METERED RESPIRATORY (INHALATION) PRN
Status: CANCELLED | OUTPATIENT
Start: 2023-10-16

## 2023-10-16 RX ORDER — ACETAMINOPHEN 325 MG/1
650 TABLET ORAL
Status: CANCELLED | OUTPATIENT
Start: 2023-10-16

## 2023-10-16 RX ORDER — EPINEPHRINE 1 MG/ML
0.3 INJECTION, SOLUTION, CONCENTRATE INTRAVENOUS PRN
Status: CANCELLED | OUTPATIENT
Start: 2023-10-16

## 2023-10-16 RX ORDER — MEPERIDINE HYDROCHLORIDE 50 MG/ML
12.5 INJECTION INTRAMUSCULAR; INTRAVENOUS; SUBCUTANEOUS PRN
Status: CANCELLED | OUTPATIENT
Start: 2023-10-16

## 2023-10-16 RX ORDER — HEPARIN 100 UNIT/ML
500 SYRINGE INTRAVENOUS PRN
Status: DISCONTINUED | OUTPATIENT
Start: 2023-10-16 | End: 2023-10-17 | Stop reason: HOSPADM

## 2023-10-16 RX ADMIN — DEXAMETHASONE SODIUM PHOSPHATE 10 MG: 10 INJECTION INTRAMUSCULAR; INTRAVENOUS at 10:47

## 2023-10-16 RX ADMIN — CARBOPLATIN 630 MG: 600 INJECTION, SOLUTION INTRAVENOUS at 12:26

## 2023-10-16 RX ADMIN — SODIUM CHLORIDE, PRESERVATIVE FREE 10 ML: 5 INJECTION INTRAVENOUS at 13:09

## 2023-10-16 RX ADMIN — SODIUM CHLORIDE 100 ML/HR: 9 INJECTION, SOLUTION INTRAVENOUS at 09:28

## 2023-10-16 RX ADMIN — SODIUM CHLORIDE, PRESERVATIVE FREE 10 ML: 5 INJECTION INTRAVENOUS at 09:28

## 2023-10-16 RX ADMIN — SODIUM CHLORIDE 200 MG: 9 INJECTION, SOLUTION INTRAVENOUS at 11:29

## 2023-10-16 RX ADMIN — HEPARIN 500 UNITS: 100 SYRINGE at 13:09

## 2023-10-16 RX ADMIN — FOSAPREPITANT 150 MG: 150 INJECTION, POWDER, LYOPHILIZED, FOR SOLUTION INTRAVENOUS at 10:52

## 2023-10-16 RX ADMIN — PALONOSETRON 0.25 MG: 0.05 INJECTION, SOLUTION INTRAVENOUS at 10:47

## 2023-10-16 RX ADMIN — PEMETREXED DISODIUM 1000 MG: 500 INJECTION, POWDER, LYOPHILIZED, FOR SOLUTION INTRAVENOUS at 12:10

## 2023-10-16 NOTE — PATIENT INSTRUCTIONS
Continue chemo per orders, rv in 3 weeks with chemo and labs cbc, cmp, tsh, amylase lipase and cortisol

## 2023-10-16 NOTE — PROGRESS NOTES
Patient arrived ambulatory with wife for cycle 2 day 1 treatment and physician visit. Patient denies complaints or concerns. Port accessed;specimen sent. Labs reviewed. Physician at bedside. Okay to treat. Patient premedicated. Corazon Pinch Keytruda infused with no sign adverse reaction;line flushed. Alimta infused with no sign adverse reaction;line flushed. Carboplatin infused with no sign adverse reaction;line flushed. Port flushed and heparinized with intact whitten needle removed per protocol. Patient ambulated off unit with wife at discharge.  Instructed to stop at  for AVS.

## 2023-10-16 NOTE — PROGRESS NOTES
DIAGNOSIS:   Multiple cervical adenopathy  Biopsy showed adenocarcinoma suggestive of lung primary. Unprovoked deep venous thrombosis in the right lower extremity  Molecular testing showed T p53 and CDK N2a mutations, no targetable mutation  CURRENT THERAPY:  Plan systemic therapy with carboplatin, Alimta and Keytruda  Anticoagulation with Eliquis  BRIEF CASE HISTORY:   Meme Starkey is a very pleasant 65 y.o. male who is referred to us for recently diagnosed adenocarcinoma of possible lung primary. He presented with unprovoked DVT in the right lower extremity. Because of the nature of his unprovoked DVT and his symptoms of neck swelling, he underwent a CT scan of the chest that showed significant mediastinal adenopathy. He is sent to us for a consultation, he is having difficulty sleeping and change in voice. Most of this difficulty in sleeping is secondary to swelling in the neck. He does not have any chest pain or shortness of breath and he does not have any hemoptysis. No change in weight. Tolerating anticoagulation very well. Confirmation of the biopsy showed that this is adenocarcinoma of lung primary. PET CT scan showed right hilar mass with mediastinal and cervical adenopathy. Molecular testing showed no targetable mutation, we decided to treat him with carboplatin, Alimta and Keytruda  INTERIM HISTORY  The patient comes in today. And to receive cycle 2 of chemotherapy. First cycle was extremely well-tolerated. No nausea or vomiting, no fever or chills. No neuropathy. He had a transient skin rash that lasted the day and then subsided. The patient describes significant improvement in his dysphagia, voice changes and swelling of the neck. Overall he is very encouraged by this and is looking forward to more chemotherapy as he is perceiving improvement in his overall condition.   PAST MEDICAL HISTORY: has a past medical history of Cancer ([address]), History of DVT (deep vein thrombosis), Hx of

## 2023-10-16 NOTE — TELEPHONE ENCOUNTER
[facility] ARRIVES AMBULATORY FOR MD VISIT & TX  DR Salcido Duty IN TO SEE PATIENT  ORDERS RECEIVED  CONTINUE CHEMO PER ORDERS  RV IN 3 WEEKS WITH CHEMO 7 LABS CBC CMP TSH AMYLASE LIPASE AND CORTISOL  LABS ADDED TO [address] ON 11/06/23  MD VISIT 11/06/23 @9:30AM [email or web address]  AVS PRINTED AND GIVEN TO PATIENT WITH INSTRUCTIONS  PATIENT REMAINS IN [address] [facility]

## 2023-10-19 ENCOUNTER — TELEPHONE (OUTPATIENT)
Dept: CASE MANAGEMENT | Age: 63
End: 2023-10-19

## 2023-10-19 NOTE — TELEPHONE ENCOUNTER
Name: Christiano Rogel  : 1960  MRN: [id number]    Oncology Navigation Follow-Up Note  Navigator spoke with pt. Offering assistance if needed , pt. Denies needs and writer will continue to follow.    Electronically signed by Michael Canales RN on 10/19/2023 at 3:26 PM

## 2023-10-24 ENCOUNTER — TELEPHONE (OUTPATIENT)
Dept: INFUSION THERAPY | Facility: MEDICAL CENTER | Age: 63
End: 2023-10-24

## 2023-10-26 ENCOUNTER — CLINICAL DOCUMENTATION (OUTPATIENT)
Facility: HOSPITAL | Age: 63
End: 2023-10-26

## 2023-10-26 NOTE — PROGRESS NOTES
Treatment plan reviewed. No assistance needed at this time; deductible and max out-of-pocket amounts met in full. Will review for CY 2024.

## 2023-10-31 ENCOUNTER — HOSPITAL ENCOUNTER (OUTPATIENT)
Facility: MEDICAL CENTER | Age: 63
End: 2023-10-31
Payer: COMMERCIAL

## 2023-11-02 ENCOUNTER — TELEPHONE (OUTPATIENT)
Dept: ONCOLOGY | Age: 63
End: 2023-11-02

## 2023-11-02 NOTE — TELEPHONE ENCOUNTER
Called pt after receiving his biopsychosocial. He denies needs at this time.    Kellie Morales MSW, LSW

## 2023-11-06 ENCOUNTER — HOSPITAL ENCOUNTER (OUTPATIENT)
Dept: INFUSION THERAPY | Facility: MEDICAL CENTER | Age: 63
Discharge: HOME | End: 2023-11-06
Payer: COMMERCIAL

## 2023-11-06 ENCOUNTER — TELEPHONE (OUTPATIENT)
Dept: ONCOLOGY | Age: 63
End: 2023-11-06

## 2023-11-06 ENCOUNTER — TELEPHONE (OUTPATIENT)
Dept: CASE MANAGEMENT | Age: 63
End: 2023-11-06

## 2023-11-06 ENCOUNTER — OFFICE VISIT (OUTPATIENT)
Dept: ONCOLOGY | Age: 63
End: 2023-11-06
Payer: COMMERCIAL

## 2023-11-06 VITALS
RESPIRATION RATE: 16 BRPM | TEMPERATURE: 98.1 F | WEIGHT: 171.6 LBS | BODY MASS INDEX: 21.45 KG/M2 | HEART RATE: 73 BPM | SYSTOLIC BLOOD PRESSURE: 121 MMHG | DIASTOLIC BLOOD PRESSURE: 75 MMHG

## 2023-11-06 DIAGNOSIS — D64.81 ANTINEOPLASTIC CHEMOTHERAPY INDUCED ANEMIA: ICD-10-CM

## 2023-11-06 DIAGNOSIS — C77.1 METASTASIS TO MEDIASTINAL LYMPH NODE (HCC): ICD-10-CM

## 2023-11-06 DIAGNOSIS — Z79.899 HIGH RISK MEDICATIONS (NOT ANTICOAGULANTS) LONG-TERM USE: ICD-10-CM

## 2023-11-06 DIAGNOSIS — C77.0 METASTASIS TO CERVICAL LYMPH NODE (HCC): ICD-10-CM

## 2023-11-06 DIAGNOSIS — E07.9 THYROID MASS: ICD-10-CM

## 2023-11-06 DIAGNOSIS — C34.90 MALIGNANT NEOPLASM OF LUNG, UNSPECIFIED LATERALITY, UNSPECIFIED PART OF LUNG (HCC): ICD-10-CM

## 2023-11-06 DIAGNOSIS — E03.9 ACQUIRED HYPOTHYROIDISM: ICD-10-CM

## 2023-11-06 DIAGNOSIS — C34.11 MALIGNANT NEOPLASM OF UPPER LOBE OF RIGHT LUNG (HCC): Primary | ICD-10-CM

## 2023-11-06 DIAGNOSIS — C34.90 MALIGNANT NEOPLASM OF LUNG, UNSPECIFIED LATERALITY, UNSPECIFIED PART OF LUNG (HCC): Primary | ICD-10-CM

## 2023-11-06 DIAGNOSIS — T45.1X5A ANTINEOPLASTIC CHEMOTHERAPY INDUCED ANEMIA: ICD-10-CM

## 2023-11-06 LAB
ALBUMIN SERPL-MCNC: 3.9 G/DL (ref 3.5–5.2)
ALP SERPL-CCNC: 53 U/L (ref 40–129)
ALT SERPL-CCNC: 63 U/L (ref 5–41)
ANION GAP SERPL CALCULATED.3IONS-SCNC: 10 MMOL/L (ref 9–17)
AST SERPL-CCNC: 38 U/L
BASOPHILS # BLD: 0 K/UL (ref 0–0.2)
BASOPHILS NFR BLD: 0 % (ref 0–2)
BILIRUB SERPL-MCNC: 0.2 MG/DL (ref 0.3–1.2)
BUN SERPL-MCNC: 19 MG/DL (ref 8–23)
BUN/CREAT SERPL: 27 (ref 9–20)
CALCIUM SERPL-MCNC: 8.7 MG/DL (ref 8.6–10.4)
CHLORIDE SERPL-SCNC: 101 MMOL/L (ref 98–107)
CHOLEST SERPL-MCNC: 224 MG/DL
CHOLESTEROL/HDL RATIO: 3.3
CO2 SERPL-SCNC: 25 MMOL/L (ref 20–31)
CORTIS SERPL-MCNC: 1.1 UG/DL (ref 2.7–18.4)
CREAT SERPL-MCNC: 0.7 MG/DL (ref 0.7–1.2)
EOSINOPHIL # BLD: 0 K/UL (ref 0–0.44)
EOSINOPHILS RELATIVE PERCENT: 0 % (ref 1–4)
ERYTHROCYTE [DISTWIDTH] IN BLOOD BY AUTOMATED COUNT: 23.7 % (ref 11.8–14.4)
GFR SERPL CREATININE-BSD FRML MDRD: >60 ML/MIN/1.73M2
GLUCOSE SERPL-MCNC: 99 MG/DL (ref 70–99)
HCT VFR BLD AUTO: 32.3 % (ref 40.7–50.3)
HDLC SERPL-MCNC: 68 MG/DL
HGB BLD-MCNC: 10.4 G/DL (ref 13–17)
IMM GRANULOCYTES # BLD AUTO: 0.06 K/UL (ref 0–0.3)
IMM GRANULOCYTES NFR BLD: 1 %
LDLC SERPL CALC-MCNC: 141 MG/DL (ref 0–130)
LYMPHOCYTES NFR BLD: 1.16 K/UL (ref 1.1–3.7)
LYMPHOCYTES RELATIVE PERCENT: 21 % (ref 24–43)
MCH RBC QN AUTO: 29.8 PG (ref 25.2–33.5)
MCHC RBC AUTO-ENTMCNC: 32.2 G/DL (ref 28.4–34.8)
MCV RBC AUTO: 92.6 FL (ref 82.6–102.9)
MONOCYTES NFR BLD: 0.55 K/UL (ref 0.1–1.2)
MONOCYTES NFR BLD: 10 % (ref 3–12)
MORPHOLOGY: ABNORMAL
NEUTROPHILS NFR BLD: 68 % (ref 36–65)
NEUTS SEG NFR BLD: 3.73 K/UL (ref 1.5–8.1)
NRBC BLD-RTO: 0 PER 100 WBC
PLATELET # BLD AUTO: 348 K/UL (ref 138–453)
PMV BLD AUTO: 9 FL (ref 8.1–13.5)
POTASSIUM SERPL-SCNC: 4.6 MMOL/L (ref 3.7–5.3)
PROT SERPL-MCNC: 6.5 G/DL (ref 6.4–8.3)
RBC # BLD AUTO: 3.49 M/UL (ref 4.21–5.77)
SODIUM SERPL-SCNC: 136 MMOL/L (ref 135–144)
TRIGL SERPL-MCNC: 73 MG/DL
TSH SERPL DL<=0.05 MIU/L-ACNC: 16.61 UIU/ML (ref 0.3–5)
WBC OTHER # BLD: 5.5 K/UL (ref 3.5–11.3)

## 2023-11-06 PROCEDURE — 80053 COMPREHEN METABOLIC PANEL: CPT

## 2023-11-06 PROCEDURE — 36591 DRAW BLOOD OFF VENOUS DEVICE: CPT

## 2023-11-06 PROCEDURE — 96417 CHEMO IV INFUS EACH ADDL SEQ: CPT

## 2023-11-06 PROCEDURE — 82533 TOTAL CORTISOL: CPT

## 2023-11-06 PROCEDURE — 84443 ASSAY THYROID STIM HORMONE: CPT

## 2023-11-06 PROCEDURE — 96375 TX/PRO/DX INJ NEW DRUG ADDON: CPT

## 2023-11-06 PROCEDURE — 96413 CHEMO IV INFUSION 1 HR: CPT

## 2023-11-06 PROCEDURE — 99215 OFFICE O/P EST HI 40 MIN: CPT | Performed by: INTERNAL MEDICINE

## 2023-11-06 PROCEDURE — 80061 LIPID PANEL: CPT

## 2023-11-06 PROCEDURE — 96372 THER/PROPH/DIAG INJ SC/IM: CPT

## 2023-11-06 PROCEDURE — 99211 OFF/OP EST MAY X REQ PHY/QHP: CPT

## 2023-11-06 PROCEDURE — 6360000002 HC RX W HCPCS: Performed by: INTERNAL MEDICINE

## 2023-11-06 PROCEDURE — 96367 TX/PROPH/DG ADDL SEQ IV INF: CPT

## 2023-11-06 PROCEDURE — 96411 CHEMO IV PUSH ADDL DRUG: CPT

## 2023-11-06 PROCEDURE — 2580000003 HC RX 258: Performed by: INTERNAL MEDICINE

## 2023-11-06 PROCEDURE — 85025 COMPLETE CBC W/AUTO DIFF WBC: CPT

## 2023-11-06 PROCEDURE — 96409 CHEMO IV PUSH SNGL DRUG: CPT

## 2023-11-06 RX ORDER — CYANOCOBALAMIN 1000 UG/ML
1000 INJECTION, SOLUTION INTRAMUSCULAR; SUBCUTANEOUS ONCE
Status: COMPLETED | OUTPATIENT
Start: 2023-11-06 | End: 2023-11-06

## 2023-11-06 RX ORDER — SODIUM CHLORIDE 0.9 % (FLUSH) 0.9 %
5-40 SYRINGE (ML) INJECTION PRN
Status: DISCONTINUED | OUTPATIENT
Start: 2023-11-06 | End: 2023-11-07 | Stop reason: HOSPADM

## 2023-11-06 RX ORDER — DEXAMETHASONE 2 MG/1
TABLET ORAL
Qty: 30 TABLET | Refills: 0 | Status: SHIPPED | OUTPATIENT
Start: 2023-11-06

## 2023-11-06 RX ORDER — HEPARIN 100 UNIT/ML
500 SYRINGE INTRAVENOUS PRN
Status: DISCONTINUED | OUTPATIENT
Start: 2023-11-06 | End: 2023-11-07 | Stop reason: HOSPADM

## 2023-11-06 RX ORDER — PALONOSETRON 0.05 MG/ML
0.25 INJECTION, SOLUTION INTRAVENOUS ONCE
Status: COMPLETED | OUTPATIENT
Start: 2023-11-06 | End: 2023-11-06

## 2023-11-06 RX ORDER — DEXAMETHASONE SODIUM PHOSPHATE 10 MG/ML
10 INJECTION INTRAMUSCULAR; INTRAVENOUS ONCE
Status: COMPLETED | OUTPATIENT
Start: 2023-11-06 | End: 2023-11-06

## 2023-11-06 RX ORDER — SODIUM CHLORIDE 9 MG/ML
5-250 INJECTION, SOLUTION INTRAVENOUS PRN
Status: DISCONTINUED | OUTPATIENT
Start: 2023-11-06 | End: 2023-11-07 | Stop reason: HOSPADM

## 2023-11-06 RX ADMIN — SODIUM CHLORIDE 100 ML/HR: 9 INJECTION, SOLUTION INTRAVENOUS at 10:01

## 2023-11-06 RX ADMIN — SODIUM CHLORIDE, PRESERVATIVE FREE 10 ML: 5 INJECTION INTRAVENOUS at 12:15

## 2023-11-06 RX ADMIN — HEPARIN 500 UNITS: 100 SYRINGE at 12:15

## 2023-11-06 RX ADMIN — PALONOSETRON 0.25 MG: 0.05 INJECTION, SOLUTION INTRAVENOUS at 10:02

## 2023-11-06 RX ADMIN — PEMETREXED DISODIUM 1000 MG: 500 INJECTION, POWDER, LYOPHILIZED, FOR SOLUTION INTRAVENOUS at 11:06

## 2023-11-06 RX ADMIN — CARBOPLATIN 630 MG: 10 INJECTION, SOLUTION INTRAVENOUS at 11:31

## 2023-11-06 RX ADMIN — SODIUM CHLORIDE 200 MG: 9 INJECTION, SOLUTION INTRAVENOUS at 10:36

## 2023-11-06 RX ADMIN — DEXAMETHASONE SODIUM PHOSPHATE 10 MG: 10 INJECTION INTRAMUSCULAR; INTRAVENOUS at 10:03

## 2023-11-06 RX ADMIN — FOSAPREPITANT 150 MG: 150 INJECTION, POWDER, LYOPHILIZED, FOR SOLUTION INTRAVENOUS at 10:09

## 2023-11-06 RX ADMIN — SODIUM CHLORIDE, PRESERVATIVE FREE 10 ML: 5 INJECTION INTRAVENOUS at 09:17

## 2023-11-06 RX ADMIN — CYANOCOBALAMIN 1000 MCG: 1000 INJECTION, SOLUTION INTRAMUSCULAR; SUBCUTANEOUS at 10:03

## 2023-11-06 NOTE — PROGRESS NOTES
Pt here for C3D1 keytruda/alimta/carbo   Arrives ambulatory   Denies complaint/concern   Labs drawn per port and reviewed  Pt seen by Dr. Norma Sotelo, orders to proceed with tx  Tx complete without incident   Pt discharged in stable condition   Returns 11/27 for C4D1 keytruda/alimta/carbo and MD visit

## 2023-11-06 NOTE — PROGRESS NOTES
palpable level 2 right cervical lymph node, significantly smaller than last evaluation lungs: Clear to auscultation bilaterally. Heart: Regular without any murmur. Abdomen: Soft, nontender. No hepatosplenomegaly. Extremities: Lower extremities show no edema, clubbing, or cyanosis. Neuro exam: intact cranial nerves bilaterally no motor or sensory deficit, gait is normal. Lymphatic: no adenopathy appreciated in the supraclavicular, axillary, cervical or inguinal area    REVIEW OF LABORATORY DATA:   Lab Results   Component Value Date    WBC 5.5 11/06/2023    HGB 10.4 (L) 11/06/2023    HCT 32.3 (L) 11/06/2023    MCV 92.6 11/06/2023     11/06/2023       Chemistry        Component Value Date/Time     11/06/2023 0905    K 4.6 11/06/2023 0905     11/06/2023 0905    CO2 25 11/06/2023 0905    BUN 19 11/06/2023 0905    CREATININE 0.7 11/06/2023 0905        Component Value Date/Time    CALCIUM 8.7 11/06/2023 0905    ALKPHOS 53 11/06/2023 0905    AST 38 11/06/2023 0905    ALT 63 (H) 11/06/2023 0905    BILITOT 0.2 (L) 11/06/2023 0905        Lab Results   Component Value Date    TSH 16.61 (H) 11/06/2023       Pathology  Path Number: [id number]     -- Diagnosis --   RIGHT CERVICAL LYMPH NODE, NEEDLE CORE BIOPSY:   -METASTATIC LUNG ADENOCARCINOMA. REVIEW OF RADIOLOGICAL RESULTS:   IMPRESSION:  1. Recently described spiculated nodule in the medial right lung apex  demonstrates abnormal metabolic activity, consistent with neoplastic process. 2.  Relatively diffuse cervical lymphadenopathy, right greater than left, as  well as mediastinal and small right hilar lymph nodes demonstrate abnormal  metabolic activity concerning for neoplastic involvement.      IMPRESSION:   Clinical diagnosis of metastatic lung cancer, clinical stage is T2 N2 M1  No evidence of metastatic disease outside of the mediastinum and cervical lymph nodes  Significant dysphagia and change in voice secondary to the tumor in the neck  Plan

## 2023-11-06 NOTE — PATIENT INSTRUCTIONS
Continue chemo per orders, rv in 3 weeks with chemo and labs cbc, cmp tsh, cortisol, amylase, lipase

## 2023-11-06 NOTE — TELEPHONE ENCOUNTER
[facility] ARRIVES AMBULATORY FOR MD VISIT & TX  DR Geroge Sever IN TO SEE PATIENT  ORDERS RECEIVED  CONTINUE CHEMO PER ORDERS  RV 3 WEEKS WITH CHEMO AND LABS CBC CMP TSH CORTISOL AMYLASE LIPASE   MD VISIT 11/27/23 @9:30AM [email or web address]  SCRIPT SENT TO PATIENT'S PHARMACY  AVS PRINTED AND GIVEN TO PATIENT WITH INSTRUCTIONS  PATIENT REMAINS IN [address] [facility]

## 2023-11-06 NOTE — TELEPHONE ENCOUNTER
Name: Belkis Marrufo  : 1960  MRN: [id number]    Oncology Navigation Follow-Up Note  Navigator met with pt. Face to face  along with Spouse and offering assistance. Pt. Denies needs at this time.    Electronically signed by Bernie Rivera RN on 2023 at 11:02 AM

## 2023-11-20 ENCOUNTER — HOSPITAL ENCOUNTER (OUTPATIENT)
Facility: MEDICAL CENTER | Age: 63
End: 2023-11-20
Payer: COMMERCIAL

## 2023-11-27 ENCOUNTER — HOSPITAL ENCOUNTER (OUTPATIENT)
Dept: INFUSION THERAPY | Facility: MEDICAL CENTER | Age: 63
Discharge: HOME | End: 2023-11-27
Payer: COMMERCIAL

## 2023-11-27 ENCOUNTER — TELEPHONE (OUTPATIENT)
Dept: ONCOLOGY | Age: 63
End: 2023-11-27

## 2023-11-27 ENCOUNTER — CLINICAL DOCUMENTATION (OUTPATIENT)
Facility: HOSPITAL | Age: 63
End: 2023-11-27

## 2023-11-27 ENCOUNTER — OFFICE VISIT (OUTPATIENT)
Dept: ONCOLOGY | Age: 63
End: 2023-11-27
Payer: COMMERCIAL

## 2023-11-27 VITALS
SYSTOLIC BLOOD PRESSURE: 126 MMHG | WEIGHT: 166.2 LBS | TEMPERATURE: 97.8 F | HEART RATE: 80 BPM | DIASTOLIC BLOOD PRESSURE: 76 MMHG | RESPIRATION RATE: 16 BRPM | BODY MASS INDEX: 20.77 KG/M2

## 2023-11-27 DIAGNOSIS — C77.0 METASTASIS TO CERVICAL LYMPH NODE (HCC): ICD-10-CM

## 2023-11-27 DIAGNOSIS — C77.1 METASTASIS TO MEDIASTINAL LYMPH NODE (HCC): ICD-10-CM

## 2023-11-27 DIAGNOSIS — E03.9 ACQUIRED HYPOTHYROIDISM: ICD-10-CM

## 2023-11-27 DIAGNOSIS — C34.90 MALIGNANT NEOPLASM OF LUNG, UNSPECIFIED LATERALITY, UNSPECIFIED PART OF LUNG (HCC): Primary | ICD-10-CM

## 2023-11-27 DIAGNOSIS — T45.1X5A ANTINEOPLASTIC CHEMOTHERAPY INDUCED ANEMIA: ICD-10-CM

## 2023-11-27 DIAGNOSIS — E07.9 THYROID MASS: ICD-10-CM

## 2023-11-27 DIAGNOSIS — D64.81 ANTINEOPLASTIC CHEMOTHERAPY INDUCED ANEMIA: ICD-10-CM

## 2023-11-27 LAB
ALBUMIN SERPL-MCNC: 4 G/DL (ref 3.5–5.2)
ALP SERPL-CCNC: 61 U/L (ref 40–129)
ALT SERPL-CCNC: 37 U/L (ref 5–41)
AMYLASE SERPL-CCNC: 82 U/L (ref 28–100)
ANION GAP SERPL CALCULATED.3IONS-SCNC: 11 MMOL/L (ref 9–17)
AST SERPL-CCNC: 22 U/L
BASOPHILS # BLD: 0 K/UL (ref 0–0.2)
BASOPHILS NFR BLD: 0 %
BILIRUB SERPL-MCNC: 0.1 MG/DL (ref 0.3–1.2)
BUN SERPL-MCNC: 16 MG/DL (ref 8–23)
BUN/CREAT SERPL: 20 (ref 9–20)
CALCIUM SERPL-MCNC: 8.9 MG/DL (ref 8.6–10.4)
CHLORIDE SERPL-SCNC: 105 MMOL/L (ref 98–107)
CO2 SERPL-SCNC: 24 MMOL/L (ref 20–31)
CORTIS SERPL-MCNC: 0.8 UG/DL (ref 2.5–19.5)
CREAT SERPL-MCNC: 0.8 MG/DL (ref 0.7–1.2)
EOSINOPHIL # BLD: 0 K/UL (ref 0–0.4)
EOSINOPHILS RELATIVE PERCENT: 0 % (ref 1–4)
ERYTHROCYTE [DISTWIDTH] IN BLOOD BY AUTOMATED COUNT: 23.6 % (ref 11.8–14.4)
GFR SERPL CREATININE-BSD FRML MDRD: >60 ML/MIN/1.73M2
GLUCOSE SERPL-MCNC: 121 MG/DL (ref 70–99)
HCT VFR BLD AUTO: 33 % (ref 40.7–50.3)
HGB BLD-MCNC: 10.8 G/DL (ref 13–17)
IMM GRANULOCYTES # BLD AUTO: 0.05 K/UL (ref 0–0.3)
IMM GRANULOCYTES NFR BLD: 1 %
LIPASE SERPL-CCNC: 25 U/L (ref 13–60)
LYMPHOCYTES NFR BLD: 1.7 K/UL (ref 1–4.8)
LYMPHOCYTES RELATIVE PERCENT: 37 % (ref 24–44)
MCH RBC QN AUTO: 31.7 PG (ref 25.2–33.5)
MCHC RBC AUTO-ENTMCNC: 32.7 G/DL (ref 28.4–34.8)
MCV RBC AUTO: 96.8 FL (ref 82.6–102.9)
MONOCYTES NFR BLD: 0.55 K/UL (ref 0.2–0.8)
MONOCYTES NFR BLD: 12 % (ref 1–7)
MORPHOLOGY: ABNORMAL
NEUTROPHILS NFR BLD: 50 % (ref 36–66)
NEUTS SEG NFR BLD: 2.3 K/UL (ref 1.8–7.7)
NRBC BLD-RTO: 0 PER 100 WBC
PLATELET # BLD AUTO: 222 K/UL (ref 138–453)
PMV BLD AUTO: 9.2 FL (ref 8.1–13.5)
POTASSIUM SERPL-SCNC: 4 MMOL/L (ref 3.7–5.3)
PROT SERPL-MCNC: 6.6 G/DL (ref 6.4–8.3)
RBC # BLD AUTO: 3.41 M/UL (ref 4.21–5.77)
SODIUM SERPL-SCNC: 140 MMOL/L (ref 135–144)
TSH SERPL DL<=0.05 MIU/L-ACNC: 7.91 UIU/ML (ref 0.3–5)
WBC OTHER # BLD: 4.6 K/UL (ref 3.5–11.3)

## 2023-11-27 PROCEDURE — 99214 OFFICE O/P EST MOD 30 MIN: CPT | Performed by: INTERNAL MEDICINE

## 2023-11-27 PROCEDURE — 2580000003 HC RX 258: Performed by: INTERNAL MEDICINE

## 2023-11-27 PROCEDURE — 96367 TX/PROPH/DG ADDL SEQ IV INF: CPT

## 2023-11-27 PROCEDURE — 99211 OFF/OP EST MAY X REQ PHY/QHP: CPT

## 2023-11-27 PROCEDURE — 84443 ASSAY THYROID STIM HORMONE: CPT

## 2023-11-27 PROCEDURE — 96411 CHEMO IV PUSH ADDL DRUG: CPT

## 2023-11-27 PROCEDURE — 85025 COMPLETE CBC W/AUTO DIFF WBC: CPT

## 2023-11-27 PROCEDURE — 82150 ASSAY OF AMYLASE: CPT

## 2023-11-27 PROCEDURE — 82533 TOTAL CORTISOL: CPT

## 2023-11-27 PROCEDURE — 6360000002 HC RX W HCPCS: Performed by: INTERNAL MEDICINE

## 2023-11-27 PROCEDURE — 96375 TX/PRO/DX INJ NEW DRUG ADDON: CPT

## 2023-11-27 PROCEDURE — 96413 CHEMO IV INFUSION 1 HR: CPT

## 2023-11-27 PROCEDURE — 96417 CHEMO IV INFUS EACH ADDL SEQ: CPT

## 2023-11-27 PROCEDURE — 80053 COMPREHEN METABOLIC PANEL: CPT

## 2023-11-27 PROCEDURE — 83690 ASSAY OF LIPASE: CPT

## 2023-11-27 PROCEDURE — 36591 DRAW BLOOD OFF VENOUS DEVICE: CPT

## 2023-11-27 RX ORDER — SODIUM CHLORIDE 0.9 % (FLUSH) 0.9 %
5-40 SYRINGE (ML) INJECTION PRN
Status: DISCONTINUED | OUTPATIENT
Start: 2023-11-27 | End: 2023-11-28 | Stop reason: HOSPADM

## 2023-11-27 RX ORDER — SODIUM CHLORIDE 9 MG/ML
5-250 INJECTION, SOLUTION INTRAVENOUS PRN
Status: DISCONTINUED | OUTPATIENT
Start: 2023-11-27 | End: 2023-11-28 | Stop reason: HOSPADM

## 2023-11-27 RX ORDER — PALONOSETRON 0.05 MG/ML
0.25 INJECTION, SOLUTION INTRAVENOUS ONCE
Status: COMPLETED | OUTPATIENT
Start: 2023-11-27 | End: 2023-11-27

## 2023-11-27 RX ORDER — DEXAMETHASONE SODIUM PHOSPHATE 10 MG/ML
10 INJECTION INTRAMUSCULAR; INTRAVENOUS ONCE
Status: COMPLETED | OUTPATIENT
Start: 2023-11-27 | End: 2023-11-27

## 2023-11-27 RX ORDER — HEPARIN 100 UNIT/ML
500 SYRINGE INTRAVENOUS PRN
Status: DISCONTINUED | OUTPATIENT
Start: 2023-11-27 | End: 2023-11-28 | Stop reason: HOSPADM

## 2023-11-27 RX ADMIN — DEXAMETHASONE SODIUM PHOSPHATE 10 MG: 10 INJECTION INTRAMUSCULAR; INTRAVENOUS at 10:48

## 2023-11-27 RX ADMIN — FOSAPREPITANT 150 MG: 150 INJECTION, POWDER, LYOPHILIZED, FOR SOLUTION INTRAVENOUS at 10:53

## 2023-11-27 RX ADMIN — PALONOSETRON 0.25 MG: 0.05 INJECTION, SOLUTION INTRAVENOUS at 10:48

## 2023-11-27 RX ADMIN — SODIUM CHLORIDE, PRESERVATIVE FREE 10 ML: 5 INJECTION INTRAVENOUS at 13:17

## 2023-11-27 RX ADMIN — CARBOPLATIN 630 MG: 10 INJECTION INTRAVENOUS at 12:25

## 2023-11-27 RX ADMIN — SODIUM CHLORIDE, PRESERVATIVE FREE 10 ML: 5 INJECTION INTRAVENOUS at 09:20

## 2023-11-27 RX ADMIN — HEPARIN 500 UNITS: 100 SYRINGE at 13:17

## 2023-11-27 RX ADMIN — SODIUM CHLORIDE 200 MG: 9 INJECTION, SOLUTION INTRAVENOUS at 11:21

## 2023-11-27 RX ADMIN — PEMETREXED DISODIUM 1000 MG: 500 INJECTION, POWDER, LYOPHILIZED, FOR SOLUTION INTRAVENOUS at 12:05

## 2023-11-27 RX ADMIN — SODIUM CHLORIDE 25 ML/HR: 9 INJECTION, SOLUTION INTRAVENOUS at 10:48

## 2023-11-27 NOTE — TELEPHONE ENCOUNTER
Name: Jarrett Lopez  : 1960  MRN: [id number]    Oncology Navigation Follow-Up Note  Navigator met with pt. And spouse face to face offering assistance if needed. Pt. Denies needs.      Electronically signed by Catracho Finn RN on 2023 at 9:18 AM

## 2023-11-27 NOTE — TELEPHONE ENCOUNTER
[facility] ARRIVES AMBULATORY FOR MD VISIT & TX  DR Dago Welch IN TO SEE PATIENT  ORDERS RECEIVED  CONTINUE CHEMO PER ORDERS  RV IN 3 WEEKS WITH CHEMO & LABS CBC CMP TSH CORTISOL AMYLASE AND LIPASE  NEED CT SCAN IN 2 WEEKS  PET/CT SCHEDULED WITH KWAME FOR 12/11/23 @10AM ARRIVE BY 9:30AM @[facility] [place] VISIT 12/18/23 @9:30AM [email or web address]  AVS PRINTED AND GIVEN TO PATIENT WITH INSTRUCTIONS  PATIENT REMAINS IN [address] [facility]

## 2023-11-27 NOTE — PATIENT INSTRUCTIONS
Continue chemo per orders, rv in 3 weeks with chemo and labs cbc, cmp tsh ,, cortisol, amylase and lipase.    Need PET  CT scan in 2weeks if possible

## 2023-11-27 NOTE — PROGRESS NOTES
Patient Assistance    Met with: Writer introduced herself to patient. Asked if insurance would change for CY 2024 and explained assistance with copay savings card would be available. Insurance will not changed in 2024. 2023 Deductible and MOOP amounts are met in full; no balance for these medications for 2023. Additional notes: Writer will prepare applications for [address] for CY 2024 copay assistance and obtain patient's signature and next appointment. Patient stated help would be appreciated and thanked writer.

## 2023-11-27 NOTE — PROGRESS NOTES
Pt here for [address] [place] [address]  Arrives ambulatory with spouse  Complaints of neuropathy to hands & feet more intense than before & dryness to bilateral feet;instructed to use skin moisturizer  Labs drawn per port & reviewed  Pt seen by Dr Es Lynch & orders to proceed with tx  Pt premedicated as ordered  Tx complete without incident  Port flushed & heparinized with intact Odell needle removed  Pt discharged in stable condition

## 2023-12-11 ENCOUNTER — HOSPITAL ENCOUNTER (OUTPATIENT)
Dept: NUCLEAR MEDICINE | Age: 63
Discharge: HOME | End: 2023-12-13
Attending: INTERNAL MEDICINE
Payer: COMMERCIAL

## 2023-12-11 DIAGNOSIS — C77.0 METASTASIS TO CERVICAL LYMPH NODE (HCC): ICD-10-CM

## 2023-12-11 DIAGNOSIS — C77.1 METASTASIS TO MEDIASTINAL LYMPH NODE (HCC): ICD-10-CM

## 2023-12-11 DIAGNOSIS — C34.90 MALIGNANT NEOPLASM OF LUNG, UNSPECIFIED LATERALITY, UNSPECIFIED PART OF LUNG (HCC): ICD-10-CM

## 2023-12-11 DIAGNOSIS — E03.9 ACQUIRED HYPOTHYROIDISM: ICD-10-CM

## 2023-12-11 LAB — GLUCOSE BLD-MCNC: 93 MG/DL (ref 75–110)

## 2023-12-11 PROCEDURE — A9552 F18 FDG: HCPCS | Performed by: INTERNAL MEDICINE

## 2023-12-11 PROCEDURE — 78815 PET IMAGE W/CT SKULL-THIGH: CPT

## 2023-12-11 PROCEDURE — 2580000003 HC RX 258: Performed by: INTERNAL MEDICINE

## 2023-12-11 PROCEDURE — 3430000000 HC RX DIAGNOSTIC RADIOPHARMACEUTICAL: Performed by: INTERNAL MEDICINE

## 2023-12-11 PROCEDURE — 82947 ASSAY GLUCOSE BLOOD QUANT: CPT

## 2023-12-11 RX ORDER — SODIUM CHLORIDE 0.9 % (FLUSH) 0.9 %
10 SYRINGE (ML) INJECTION ONCE
Status: COMPLETED | OUTPATIENT
Start: 2023-12-11 | End: 2023-12-11

## 2023-12-11 RX ORDER — FLUDEOXYGLUCOSE F 18 200 MCI/ML
10 INJECTION, SOLUTION INTRAVENOUS
Status: COMPLETED | OUTPATIENT
Start: 2023-12-11 | End: 2023-12-11

## 2023-12-11 RX ADMIN — FLUDEOXYGLUCOSE F 18 10.02 MILLICURIE: 200 INJECTION, SOLUTION INTRAVENOUS at 09:50

## 2023-12-11 RX ADMIN — SODIUM CHLORIDE, PRESERVATIVE FREE 10 ML: 5 INJECTION INTRAVENOUS at 09:50

## 2023-12-18 ENCOUNTER — HOSPITAL ENCOUNTER (OUTPATIENT)
Dept: INFUSION THERAPY | Facility: MEDICAL CENTER | Age: 63
Discharge: HOME | End: 2023-12-18
Payer: COMMERCIAL

## 2023-12-18 DIAGNOSIS — E03.9 ACQUIRED HYPOTHYROIDISM: ICD-10-CM

## 2023-12-18 DIAGNOSIS — T45.1X5A ANTINEOPLASTIC CHEMOTHERAPY INDUCED ANEMIA: ICD-10-CM

## 2023-12-18 DIAGNOSIS — D64.81 ANTINEOPLASTIC CHEMOTHERAPY INDUCED ANEMIA: ICD-10-CM

## 2023-12-18 DIAGNOSIS — C34.90 MALIGNANT NEOPLASM OF LUNG, UNSPECIFIED LATERALITY, UNSPECIFIED PART OF LUNG (HCC): Primary | ICD-10-CM

## 2023-12-18 DIAGNOSIS — E07.9 THYROID MASS: ICD-10-CM

## 2023-12-18 DIAGNOSIS — C77.1 METASTASIS TO MEDIASTINAL LYMPH NODE (HCC): ICD-10-CM

## 2023-12-18 DIAGNOSIS — C77.0 METASTASIS TO CERVICAL LYMPH NODE (HCC): ICD-10-CM

## 2023-12-18 LAB
ALBUMIN SERPL-MCNC: 3.9 G/DL (ref 3.5–5.2)
ALP SERPL-CCNC: 62 U/L (ref 40–129)
ALT SERPL-CCNC: 28 U/L (ref 5–41)
AMYLASE SERPL-CCNC: 311 U/L (ref 28–100)
ANION GAP SERPL CALCULATED.3IONS-SCNC: 9 MMOL/L (ref 9–17)
AST SERPL-CCNC: 19 U/L
BASOPHILS # BLD: 0.03 K/UL (ref 0–0.2)
BASOPHILS NFR BLD: 1 % (ref 0–2)
BILIRUB SERPL-MCNC: 0.1 MG/DL (ref 0.3–1.2)
BUN SERPL-MCNC: 15 MG/DL (ref 8–23)
BUN/CREAT SERPL: 17 (ref 9–20)
CALCIUM SERPL-MCNC: 8.8 MG/DL (ref 8.6–10.4)
CHLORIDE SERPL-SCNC: 104 MMOL/L (ref 98–107)
CO2 SERPL-SCNC: 27 MMOL/L (ref 20–31)
CORTIS SERPL-MCNC: 12.8 UG/DL (ref 2.5–19.5)
CREAT SERPL-MCNC: 0.9 MG/DL (ref 0.7–1.2)
EOSINOPHIL # BLD: 0.03 K/UL (ref 0–0.44)
EOSINOPHILS RELATIVE PERCENT: 1 % (ref 1–4)
ERYTHROCYTE [DISTWIDTH] IN BLOOD BY AUTOMATED COUNT: 21.4 % (ref 11.8–14.4)
GFR SERPL CREATININE-BSD FRML MDRD: >60 ML/MIN/1.73M2
GLUCOSE SERPL-MCNC: 109 MG/DL (ref 70–99)
HCT VFR BLD AUTO: 30.2 % (ref 40.7–50.3)
HGB BLD-MCNC: 10.1 G/DL (ref 13–17)
IMM GRANULOCYTES # BLD AUTO: 0.03 K/UL (ref 0–0.3)
IMM GRANULOCYTES NFR BLD: 1 %
LIPASE SERPL-CCNC: 416 U/L (ref 13–60)
LYMPHOCYTES NFR BLD: 1.2 K/UL (ref 1.1–3.7)
LYMPHOCYTES RELATIVE PERCENT: 43 % (ref 24–43)
MCH RBC QN AUTO: 33.4 PG (ref 25.2–33.5)
MCHC RBC AUTO-ENTMCNC: 33.4 G/DL (ref 28.4–34.8)
MCV RBC AUTO: 100 FL (ref 82.6–102.9)
MONOCYTES NFR BLD: 0.67 K/UL (ref 0.1–1.2)
MONOCYTES NFR BLD: 24 % (ref 3–12)
MORPHOLOGY: ABNORMAL
NEUTROPHILS NFR BLD: 30 % (ref 36–65)
NEUTS SEG NFR BLD: 0.84 K/UL (ref 1.5–8.1)
NRBC BLD-RTO: 0 PER 100 WBC
PLATELET # BLD AUTO: 266 K/UL (ref 138–453)
PMV BLD AUTO: 9.5 FL (ref 8.1–13.5)
POTASSIUM SERPL-SCNC: 4 MMOL/L (ref 3.7–5.3)
PROT SERPL-MCNC: 6.3 G/DL (ref 6.4–8.3)
RBC # BLD AUTO: 3.02 M/UL (ref 4.21–5.77)
SODIUM SERPL-SCNC: 140 MMOL/L (ref 135–144)
TSH SERPL DL<=0.05 MIU/L-ACNC: 7.13 UIU/ML (ref 0.3–5)
WBC OTHER # BLD: 2.8 K/UL (ref 3.5–11.3)

## 2023-12-18 PROCEDURE — 82150 ASSAY OF AMYLASE: CPT

## 2023-12-18 PROCEDURE — 85025 COMPLETE CBC W/AUTO DIFF WBC: CPT

## 2023-12-18 PROCEDURE — 36591 DRAW BLOOD OFF VENOUS DEVICE: CPT

## 2023-12-18 PROCEDURE — 83690 ASSAY OF LIPASE: CPT

## 2023-12-18 PROCEDURE — 6360000002 HC RX W HCPCS: Performed by: INTERNAL MEDICINE

## 2023-12-18 PROCEDURE — 96413 CHEMO IV INFUSION 1 HR: CPT

## 2023-12-18 PROCEDURE — 80053 COMPREHEN METABOLIC PANEL: CPT

## 2023-12-18 PROCEDURE — 2580000003 HC RX 258: Performed by: INTERNAL MEDICINE

## 2023-12-18 PROCEDURE — 84443 ASSAY THYROID STIM HORMONE: CPT

## 2023-12-18 PROCEDURE — 82533 TOTAL CORTISOL: CPT

## 2023-12-18 RX ORDER — SODIUM CHLORIDE 0.9 % (FLUSH) 0.9 %
5-40 SYRINGE (ML) INJECTION PRN
Status: DISCONTINUED | OUTPATIENT
Start: 2023-12-18 | End: 2023-12-19 | Stop reason: HOSPADM

## 2023-12-18 RX ORDER — SODIUM CHLORIDE 9 MG/ML
5-250 INJECTION, SOLUTION INTRAVENOUS PRN
Status: DISCONTINUED | OUTPATIENT
Start: 2023-12-18 | End: 2023-12-19 | Stop reason: HOSPADM

## 2023-12-18 RX ORDER — HEPARIN 100 UNIT/ML
500 SYRINGE INTRAVENOUS PRN
Status: DISCONTINUED | OUTPATIENT
Start: 2023-12-18 | End: 2023-12-19 | Stop reason: HOSPADM

## 2023-12-18 RX ADMIN — SODIUM CHLORIDE 200 MG: 9 INJECTION, SOLUTION INTRAVENOUS at 10:09

## 2023-12-18 RX ADMIN — HEPARIN 500 UNITS: 100 SYRINGE at 10:49

## 2023-12-18 RX ADMIN — SODIUM CHLORIDE, PRESERVATIVE FREE 10 ML: 5 INJECTION INTRAVENOUS at 09:11

## 2023-12-18 RX ADMIN — SODIUM CHLORIDE, PRESERVATIVE FREE 10 ML: 5 INJECTION INTRAVENOUS at 10:49

## 2023-12-18 RX ADMIN — SODIUM CHLORIDE 25 ML/HR: 9 INJECTION, SOLUTION INTRAVENOUS at 09:51

## 2023-12-18 NOTE — PROGRESS NOTES
Pt here for Yumiko Houston  Arrives ambulatory  Denies complaint/concern  Labs drawn per port & reviewed  Pt seen by Dr Rutledge Falling & orders to proceed with tx  Keytruda infused without incident  Port flushed & heparinized with intact Odell needle removed  Pt ambulatory at discharge in stable condition with wife  Returns 1/8 for Yumiko Houston

## 2024-01-01 ENCOUNTER — APPOINTMENT (OUTPATIENT)
Dept: DIALYSIS | Age: 64
End: 2024-01-01
Payer: COMMERCIAL

## 2024-01-01 ENCOUNTER — APPOINTMENT (OUTPATIENT)
Dept: CT IMAGING | Age: 64
End: 2024-01-01
Payer: COMMERCIAL

## 2024-01-01 ENCOUNTER — HOSPITAL ENCOUNTER (INPATIENT)
Age: 64
LOS: 6 days | End: 2024-10-25
Attending: EMERGENCY MEDICINE | Admitting: STUDENT IN AN ORGANIZED HEALTH CARE EDUCATION/TRAINING PROGRAM
Payer: COMMERCIAL

## 2024-01-01 ENCOUNTER — HOSPITAL ENCOUNTER (OUTPATIENT)
Facility: MEDICAL CENTER | Age: 64
End: 2024-01-01

## 2024-01-01 ENCOUNTER — APPOINTMENT (OUTPATIENT)
Dept: GENERAL RADIOLOGY | Age: 64
End: 2024-01-01
Payer: COMMERCIAL

## 2024-01-01 ENCOUNTER — TELEPHONE (OUTPATIENT)
Dept: GASTROENTEROLOGY | Age: 64
End: 2024-01-01

## 2024-01-01 ENCOUNTER — TELEPHONE (OUTPATIENT)
Dept: ONCOLOGY | Age: 64
End: 2024-01-01

## 2024-01-01 ENCOUNTER — HOSPITAL ENCOUNTER (OUTPATIENT)
Age: 64
Setting detail: OUTPATIENT SURGERY
Discharge: HOME | End: 2024-10-15
Attending: SURGERY | Admitting: SURGERY
Payer: COMMERCIAL

## 2024-01-01 ENCOUNTER — PREP FOR PROCEDURE (OUTPATIENT)
Age: 64
End: 2024-01-01

## 2024-01-01 ENCOUNTER — APPOINTMENT (OUTPATIENT)
Dept: ULTRASOUND IMAGING | Age: 64
End: 2024-01-01
Payer: COMMERCIAL

## 2024-01-01 ENCOUNTER — HOSPITAL ENCOUNTER (OUTPATIENT)
Dept: INFUSION THERAPY | Facility: MEDICAL CENTER | Age: 64
Discharge: HOME | End: 2024-10-21

## 2024-01-01 ENCOUNTER — APPOINTMENT (OUTPATIENT)
Dept: INTERVENTIONAL RADIOLOGY/VASCULAR | Age: 64
End: 2024-01-01
Payer: COMMERCIAL

## 2024-01-01 VITALS
BODY MASS INDEX: 20.51 KG/M2 | WEIGHT: 165 LBS | DIASTOLIC BLOOD PRESSURE: 59 MMHG | SYSTOLIC BLOOD PRESSURE: 102 MMHG | HEART RATE: 98 BPM | RESPIRATION RATE: 16 BRPM | OXYGEN SATURATION: 97 % | HEIGHT: 75 IN | TEMPERATURE: 97.1 F

## 2024-01-01 VITALS
SYSTOLIC BLOOD PRESSURE: 105 MMHG | HEIGHT: 74 IN | TEMPERATURE: 90 F | DIASTOLIC BLOOD PRESSURE: 58 MMHG | BODY MASS INDEX: 21.87 KG/M2 | OXYGEN SATURATION: 64 % | WEIGHT: 170.42 LBS

## 2024-01-01 DIAGNOSIS — E87.1 HYPONATREMIA: ICD-10-CM

## 2024-01-01 DIAGNOSIS — N17.9 AKI (ACUTE KIDNEY INJURY) (HCC): ICD-10-CM

## 2024-01-01 DIAGNOSIS — L98.9 SKIN LESION OF NECK: ICD-10-CM

## 2024-01-01 DIAGNOSIS — E87.5 HYPERKALEMIA: ICD-10-CM

## 2024-01-01 DIAGNOSIS — R57.9 SHOCK: ICD-10-CM

## 2024-01-01 DIAGNOSIS — I26.99 ACUTE PULMONARY EMBOLISM WITHOUT ACUTE COR PULMONALE, UNSPECIFIED PULMONARY EMBOLISM TYPE (HCC): Primary | ICD-10-CM

## 2024-01-01 LAB
ALBUMIN FLD-MCNC: 1.8 G/DL
ALBUMIN PERCENT: 38 % (ref 45–65)
ALBUMIN SERPL-MCNC: 1.8 G/DL (ref 3.2–5.2)
ALBUMIN SERPL-MCNC: 2.4 G/DL (ref 3.5–5.2)
ALBUMIN SERPL-MCNC: 2.7 G/DL (ref 3.5–5.2)
ALBUMIN SERPL-MCNC: 2.8 G/DL (ref 3.5–5.2)
ALBUMIN/GLOB SERPL: 1 {RATIO} (ref 1–2.5)
ALBUMIN/GLOB SERPL: 1 {RATIO} (ref 1–2.5)
ALBUMIN/GLOB SERPL: 2 {RATIO} (ref 1–2.5)
ALLEN TEST: ABNORMAL
ALLEN TEST: ABNORMAL
ALP SERPL-CCNC: 213 U/L (ref 40–129)
ALP SERPL-CCNC: 244 U/L (ref 40–129)
ALP SERPL-CCNC: 322 U/L (ref 40–129)
ALPHA 2 PERCENT: 20 % (ref 6–13)
ALPHA1 GLOB SERPL ELPH-MCNC: 0.6 G/DL (ref 0.1–0.4)
ALPHA1 GLOB SERPL ELPH-MCNC: 13 % (ref 3–6)
ALPHA2 GLOB SERPL ELPH-MCNC: 1 G/DL (ref 0.5–0.9)
ALT SERPL-CCNC: 1765 U/L (ref 10–50)
ALT SERPL-CCNC: 35 U/L (ref 10–50)
ALT SERPL-CCNC: 36 U/L (ref 10–50)
ANA SER QL IA: NEGATIVE
ANION GAP SERPL CALCULATED.3IONS-SCNC: 15 MMOL/L (ref 9–16)
ANION GAP SERPL CALCULATED.3IONS-SCNC: 17 MMOL/L (ref 9–16)
ANION GAP SERPL CALCULATED.3IONS-SCNC: 18 MMOL/L (ref 9–16)
ANION GAP SERPL CALCULATED.3IONS-SCNC: 18 MMOL/L (ref 9–16)
ANION GAP SERPL CALCULATED.3IONS-SCNC: 19 MMOL/L (ref 9–16)
ANION GAP SERPL CALCULATED.3IONS-SCNC: 20 MMOL/L (ref 9–16)
ANION GAP SERPL CALCULATED.3IONS-SCNC: 26 MMOL/L (ref 9–16)
ANTI-XA UNFRAC HEPARIN: 1.22 IU/L
ANTI-XA UNFRAC HEPARIN: 1.5 IU/L
APPEARANCE FLD: NORMAL
AST SERPL-CCNC: 46 U/L (ref 10–50)
AST SERPL-CCNC: 47 U/L (ref 10–50)
AST SERPL-CCNC: 5969 U/L (ref 10–50)
B-GLOBULIN SERPL ELPH-MCNC: 0.5 G/DL (ref 0.5–1.1)
B-GLOBULIN SERPL ELPH-MCNC: 11 % (ref 11–19)
B-OH-BUTYR SERPL-MCNC: 0.18 MMOL/L (ref 0.02–0.27)
BASOPHILS # BLD: 0 K/UL (ref 0–0.2)
BASOPHILS NFR BLD: 0 % (ref 0–2)
BILIRUB DIRECT SERPL-MCNC: 0.5 MG/DL (ref 0–0.2)
BILIRUB SERPL-MCNC: 0.5 MG/DL (ref 0–1.2)
BILIRUB SERPL-MCNC: 0.6 MG/DL (ref 0–1.2)
BILIRUB SERPL-MCNC: 0.7 MG/DL (ref 0–1.2)
BILIRUB SERPL-MCNC: 2.7 MG/DL (ref 0–1.2)
BILIRUB UR QL STRIP: NEGATIVE
BLOOD BANK BLOOD PRODUCT EXPIRATION DATE: NORMAL
BLOOD BANK DISPENSE STATUS: NORMAL
BLOOD BANK DISPENSE STATUS: NORMAL
BLOOD BANK ISBT PRODUCT BLOOD TYPE: 6200
BLOOD BANK PRODUCT CODE: NORMAL
BLOOD BANK UNIT TYPE AND RH: NORMAL
BODY FLD TYPE: NORMAL
BODY TEMPERATURE: 37
BODY TEMPERATURE: 37
BPU ID: NORMAL
BPU ID: NORMAL
BUN BLD-MCNC: 114 MG/DL (ref 8–26)
BUN BLD-MCNC: 46 MG/DL (ref 8–26)
BUN SERPL-MCNC: 101 MG/DL (ref 8–23)
BUN SERPL-MCNC: 104 MG/DL (ref 8–23)
BUN SERPL-MCNC: 115 MG/DL (ref 8–23)
BUN SERPL-MCNC: 118 MG/DL (ref 8–23)
BUN SERPL-MCNC: 118 MG/DL (ref 8–23)
BUN SERPL-MCNC: 28 MG/DL (ref 8–23)
BUN SERPL-MCNC: 50 MG/DL (ref 8–23)
BUN SERPL-MCNC: 54 MG/DL (ref 8–23)
BUN SERPL-MCNC: 66 MG/DL (ref 8–23)
BUN SERPL-MCNC: 69 MG/DL (ref 8–23)
BUN SERPL-MCNC: 75 MG/DL (ref 8–23)
BUN SERPL-MCNC: 94 MG/DL (ref 8–23)
CA-I BLD-SCNC: 0.94 MMOL/L (ref 1.15–1.33)
CA-I BLD-SCNC: 0.95 MMOL/L (ref 1.15–1.33)
CALCIUM SERPL-MCNC: 7.1 MG/DL (ref 8.6–10.4)
CALCIUM SERPL-MCNC: 7.1 MG/DL (ref 8.6–10.4)
CALCIUM SERPL-MCNC: 7.4 MG/DL (ref 8.6–10.4)
CALCIUM SERPL-MCNC: 7.4 MG/DL (ref 8.6–10.4)
CALCIUM SERPL-MCNC: 7.5 MG/DL (ref 8.6–10.4)
CALCIUM SERPL-MCNC: 7.6 MG/DL (ref 8.6–10.4)
CALCIUM SERPL-MCNC: 7.7 MG/DL (ref 8.6–10.4)
CALCIUM SERPL-MCNC: 8 MG/DL (ref 8.6–10.4)
CALCIUM SERPL-MCNC: 8 MG/DL (ref 8.6–10.4)
CALCIUM SERPL-MCNC: 8.1 MG/DL (ref 8.6–10.4)
CALCIUM SERPL-MCNC: 8.3 MG/DL (ref 8.6–10.4)
CALCIUM SERPL-MCNC: 8.4 MG/DL (ref 8.6–10.4)
CASE NUMBER:: NORMAL
CASTS #/AREA URNS LPF: NORMAL /LPF (ref 0–2)
CASTS #/AREA URNS LPF: NORMAL /LPF (ref 0–2)
CELLS COUNTED: 200
CHLORIDE BLD-SCNC: 88 MMOL/L (ref 98–107)
CHLORIDE BLD-SCNC: 91 MMOL/L (ref 98–107)
CHLORIDE SERPL-SCNC: 82 MMOL/L (ref 98–107)
CHLORIDE SERPL-SCNC: 82 MMOL/L (ref 98–107)
CHLORIDE SERPL-SCNC: 85 MMOL/L (ref 98–107)
CHLORIDE SERPL-SCNC: 86 MMOL/L (ref 98–107)
CHLORIDE SERPL-SCNC: 87 MMOL/L (ref 98–107)
CHLORIDE SERPL-SCNC: 94 MMOL/L (ref 98–107)
CHOLEST FLD-MCNC: 57 MG/DL
CLARITY UR: ABNORMAL
CLOT CHECK: NORMAL
CO2 BLD CALC-SCNC: 16 MMOL/L (ref 22–30)
CO2 BLD CALC-SCNC: 18 MMOL/L (ref 22–30)
CO2 SERPL-SCNC: 12 MMOL/L (ref 20–31)
CO2 SERPL-SCNC: 13 MMOL/L (ref 20–31)
CO2 SERPL-SCNC: 15 MMOL/L (ref 20–31)
CO2 SERPL-SCNC: 16 MMOL/L (ref 20–31)
CO2 SERPL-SCNC: 16 MMOL/L (ref 20–31)
CO2 SERPL-SCNC: 17 MMOL/L (ref 20–31)
CO2 SERPL-SCNC: 17 MMOL/L (ref 20–31)
CO2 SERPL-SCNC: 19 MMOL/L (ref 20–31)
CO2 SERPL-SCNC: 20 MMOL/L (ref 20–31)
CO2 SERPL-SCNC: 22 MMOL/L (ref 20–31)
COHGB MFR BLD: 1.2 % (ref 0–5)
COHGB MFR BLD: 1.4 % (ref 0–5)
COLOR FLD: NORMAL
COLOR UR: ABNORMAL
COMPONENT: NORMAL
COMPONENT: NORMAL
CORTIS SERPL-MCNC: 11.1 UG/DL (ref 2.5–19.5)
CREAT SERPL-MCNC: 2.1 MG/DL (ref 0.7–1.2)
CREAT SERPL-MCNC: 3.4 MG/DL (ref 0.7–1.2)
CREAT SERPL-MCNC: 3.4 MG/DL (ref 0.7–1.2)
CREAT SERPL-MCNC: 3.5 MG/DL (ref 0.7–1.2)
CREAT SERPL-MCNC: 3.9 MG/DL (ref 0.7–1.2)
CREAT SERPL-MCNC: 4 MG/DL (ref 0.7–1.2)
CREAT SERPL-MCNC: 4.2 MG/DL (ref 0.7–1.2)
CREAT SERPL-MCNC: 4.2 MG/DL (ref 0.7–1.2)
CREAT SERPL-MCNC: 4.4 MG/DL (ref 0.7–1.2)
CREAT SERPL-MCNC: 4.4 MG/DL (ref 0.7–1.2)
CREAT SERPL-MCNC: 4.5 MG/DL (ref 0.7–1.2)
CREAT SERPL-MCNC: 4.6 MG/DL (ref 0.7–1.2)
CREAT UR-MCNC: 89.5 MG/DL (ref 39–259)
CRP SERPL HS-MCNC: 260 MG/L (ref 0–5)
CRP SERPL HS-MCNC: 279 MG/L (ref 0–5)
DAT POLY-SP REAG RBC QL: NEGATIVE
DSDNA IGG SER QL IA: <0.5 IU/ML
EGFR, POC: 20 ML/MIN/1.73M2
EGFR, POC: NORMAL ML/MIN/1.73M2
EKG ATRIAL RATE: 80 BPM
EKG ATRIAL RATE: 94 BPM
EKG P AXIS: 77 DEGREES
EKG P AXIS: 92 DEGREES
EKG P-R INTERVAL: 156 MS
EKG P-R INTERVAL: 202 MS
EKG Q-T INTERVAL: 348 MS
EKG Q-T INTERVAL: 384 MS
EKG QRS DURATION: 112 MS
EKG QRS DURATION: 80 MS
EKG QTC CALCULATION (BAZETT): 435 MS
EKG QTC CALCULATION (BAZETT): 442 MS
EKG R AXIS: 79 DEGREES
EKG R AXIS: 88 DEGREES
EKG T AXIS: -110 DEGREES
EKG T AXIS: -69 DEGREES
EKG VENTRICULAR RATE: 80 BPM
EKG VENTRICULAR RATE: 94 BPM
EOSINOPHIL # BLD: 0 K/UL (ref 0–0.4)
EOSINOPHIL # BLD: 0.3 K/UL (ref 0–0.44)
EOSINOPHIL # BLD: 0.31 K/UL (ref 0–0.44)
EOSINOPHILS RELATIVE PERCENT: 0 % (ref 1–4)
EOSINOPHILS RELATIVE PERCENT: 1 % (ref 1–4)
EOSINOPHILS RELATIVE PERCENT: 1 % (ref 1–4)
EPI CELLS #/AREA URNS HPF: NORMAL /HPF (ref 0–5)
ERYTHROCYTE [DISTWIDTH] IN BLOOD BY AUTOMATED COUNT: 17 % (ref 11.8–14.4)
ERYTHROCYTE [DISTWIDTH] IN BLOOD BY AUTOMATED COUNT: 17.2 % (ref 11.8–14.4)
ERYTHROCYTE [DISTWIDTH] IN BLOOD BY AUTOMATED COUNT: 17.5 % (ref 11.8–14.4)
ERYTHROCYTE [DISTWIDTH] IN BLOOD BY AUTOMATED COUNT: 17.6 % (ref 11.8–14.4)
ERYTHROCYTE [DISTWIDTH] IN BLOOD BY AUTOMATED COUNT: 18.3 % (ref 11.8–14.4)
ERYTHROCYTE [DISTWIDTH] IN BLOOD BY AUTOMATED COUNT: 18.8 % (ref 11.8–14.4)
ERYTHROCYTE [DISTWIDTH] IN BLOOD BY AUTOMATED COUNT: 19.1 % (ref 11.8–14.4)
ERYTHROCYTE [DISTWIDTH] IN BLOOD BY AUTOMATED COUNT: 22 % (ref 11.8–14.4)
ETHANOL PERCENT: <0.01 %
ETHANOLAMINE SERPL-MCNC: <10 MG/DL (ref 0–0.08)
FIBRINOGEN PPP-MCNC: 126 MG/DL (ref 203–521)
FIO2 ON VENT: ABNORMAL %
FIO2 ON VENT: ABNORMAL %
FIO2: 40
GAMMA GLOB SERPL ELPH-MCNC: 0.9 G/DL (ref 0.5–1.5)
GAMMA GLOBULIN %: 18 % (ref 9–20)
GFR, ESTIMATED: 14 ML/MIN/1.73M2
GFR, ESTIMATED: 15 ML/MIN/1.73M2
GFR, ESTIMATED: 15 ML/MIN/1.73M2
GFR, ESTIMATED: 16 ML/MIN/1.73M2
GFR, ESTIMATED: 17 ML/MIN/1.73M2
GFR, ESTIMATED: 19 ML/MIN/1.73M2
GFR, ESTIMATED: 20 ML/MIN/1.73M2
GFR, ESTIMATED: 20 ML/MIN/1.73M2
GFR, ESTIMATED: 36 ML/MIN/1.73M2
GLUCOSE BLD-MCNC: 101 MG/DL (ref 75–110)
GLUCOSE BLD-MCNC: 102 MG/DL (ref 74–100)
GLUCOSE BLD-MCNC: 105 MG/DL (ref 75–110)
GLUCOSE BLD-MCNC: 116 MG/DL (ref 75–110)
GLUCOSE BLD-MCNC: 116 MG/DL (ref 75–110)
GLUCOSE BLD-MCNC: 119 MG/DL (ref 75–110)
GLUCOSE BLD-MCNC: 122 MG/DL (ref 75–110)
GLUCOSE BLD-MCNC: 161 MG/DL (ref 75–110)
GLUCOSE BLD-MCNC: 161 MG/DL (ref 75–110)
GLUCOSE BLD-MCNC: 203 MG/DL (ref 75–110)
GLUCOSE BLD-MCNC: 30 MG/DL (ref 75–110)
GLUCOSE BLD-MCNC: 46 MG/DL (ref 74–100)
GLUCOSE BLD-MCNC: 51 MG/DL (ref 75–110)
GLUCOSE BLD-MCNC: 55 MG/DL (ref 75–110)
GLUCOSE BLD-MCNC: 64 MG/DL (ref 75–110)
GLUCOSE BLD-MCNC: 74 MG/DL (ref 75–110)
GLUCOSE BLD-MCNC: 74 MG/DL (ref 75–110)
GLUCOSE BLD-MCNC: 77 MG/DL (ref 74–100)
GLUCOSE BLD-MCNC: 79 MG/DL (ref 75–110)
GLUCOSE BLD-MCNC: 83 MG/DL (ref 75–110)
GLUCOSE BLD-MCNC: 86 MG/DL (ref 74–100)
GLUCOSE BLD-MCNC: 89 MG/DL (ref 75–110)
GLUCOSE BLD-MCNC: 92 MG/DL (ref 75–110)
GLUCOSE BLD-MCNC: 94 MG/DL (ref 75–110)
GLUCOSE BLD-MCNC: 95 MG/DL (ref 75–110)
GLUCOSE SERPL-MCNC: 104 MG/DL (ref 74–99)
GLUCOSE SERPL-MCNC: 112 MG/DL (ref 74–99)
GLUCOSE SERPL-MCNC: 114 MG/DL (ref 74–99)
GLUCOSE SERPL-MCNC: 121 MG/DL (ref 74–99)
GLUCOSE SERPL-MCNC: 52 MG/DL (ref 74–99)
GLUCOSE SERPL-MCNC: 63 MG/DL (ref 74–99)
GLUCOSE SERPL-MCNC: 81 MG/DL (ref 74–99)
GLUCOSE SERPL-MCNC: 84 MG/DL (ref 74–99)
GLUCOSE SERPL-MCNC: 84 MG/DL (ref 74–99)
GLUCOSE SERPL-MCNC: 86 MG/DL (ref 74–99)
GLUCOSE SERPL-MCNC: 89 MG/DL (ref 74–99)
GLUCOSE SERPL-MCNC: 95 MG/DL (ref 74–99)
GLUCOSE UR STRIP-MCNC: NEGATIVE MG/DL
HAPTOGLOB SERPL-MCNC: 338 MG/DL (ref 30–200)
HAV IGM SERPL QL IA: NONREACTIVE
HBV CORE IGM SERPL QL IA: NONREACTIVE
HBV SURFACE AB SERPL IA-ACNC: <3.5 MIU/ML
HBV SURFACE AG SERPL QL IA: NONREACTIVE
HCO3 VENOUS: 16.9 MMOL/L (ref 24–30)
HCO3 VENOUS: 17.9 MMOL/L (ref 22–29)
HCO3 VENOUS: 19.5 MMOL/L (ref 24–30)
HCT VFR BLD AUTO: 20.8 % (ref 40.7–50.3)
HCT VFR BLD AUTO: 20.8 % (ref 40.7–50.3)
HCT VFR BLD AUTO: 24.2 % (ref 40.7–50.3)
HCT VFR BLD AUTO: 24.5 % (ref 40.7–50.3)
HCT VFR BLD AUTO: 27.8 % (ref 40.7–50.3)
HCT VFR BLD AUTO: 27.9 % (ref 40.7–50.3)
HCT VFR BLD AUTO: 28 % (ref 41–53)
HCT VFR BLD AUTO: 28.1 % (ref 40.7–50.3)
HCT VFR BLD AUTO: 28.5 % (ref 40.7–50.3)
HCT VFR BLD AUTO: 29.3 % (ref 40.7–50.3)
HCT VFR BLD AUTO: 30 % (ref 40.7–50.3)
HCT VFR BLD AUTO: 33.1 % (ref 40.7–50.3)
HCT VFR BLD AUTO: 39 % (ref 41–53)
HCV AB SERPL QL IA: NONREACTIVE
HGB BLD-MCNC: 11.2 G/DL (ref 13–17)
HGB BLD-MCNC: 6.4 G/DL (ref 13–17)
HGB BLD-MCNC: 6.9 G/DL (ref 13–17)
HGB BLD-MCNC: 7.8 G/DL (ref 13–17)
HGB BLD-MCNC: 8.4 G/DL (ref 13–17)
HGB BLD-MCNC: 9.1 G/DL (ref 13–17)
HGB BLD-MCNC: 9.4 G/DL (ref 13–17)
HGB BLD-MCNC: 9.6 G/DL (ref 13–17)
HGB BLD-MCNC: 9.9 G/DL (ref 13–17)
HGB UR QL STRIP.AUTO: ABNORMAL
IMM GRANULOCYTES # BLD AUTO: 0.33 K/UL (ref 0–0.3)
IMM GRANULOCYTES # BLD AUTO: 1.07 K/UL (ref 0–0.3)
IMM GRANULOCYTES # BLD AUTO: 1.2 K/UL (ref 0–0.3)
IMM GRANULOCYTES # BLD AUTO: 1.23 K/UL (ref 0–0.3)
IMM GRANULOCYTES # BLD AUTO: 1.39 K/UL (ref 0–0.3)
IMM GRANULOCYTES # BLD AUTO: 1.55 K/UL (ref 0–0.3)
IMM GRANULOCYTES # BLD AUTO: 1.58 K/UL (ref 0–0.3)
IMM GRANULOCYTES NFR BLD: 1 %
IMM GRANULOCYTES NFR BLD: 3 %
IMM GRANULOCYTES NFR BLD: 4 %
IMM GRANULOCYTES NFR BLD: 5 %
IMM GRANULOCYTES NFR BLD: 9 %
IMM RETICS NFR: 30.5 % (ref 2.7–18.3)
INR PPP: 2
INR PPP: 4
INR PPP: 5.1
ITYP INTERPRETATION: NORMAL
KETONES UR STRIP-MCNC: NEGATIVE MG/DL
LACTIC ACID, SEPSIS WHOLE BLOOD: 2.9 MMOL/L (ref 0.5–1.9)
LACTIC ACID, WHOLE BLOOD: 10.7 MMOL/L (ref 0.7–2.1)
LACTIC ACID, WHOLE BLOOD: 12 MMOL/L (ref 0.7–2.1)
LACTIC ACID, WHOLE BLOOD: 2.2 MMOL/L (ref 0.7–2.1)
LACTIC ACID, WHOLE BLOOD: 2.3 MMOL/L (ref 0.7–2.1)
LACTIC ACID, WHOLE BLOOD: 3.1 MMOL/L (ref 0.7–2.1)
LACTIC ACID, WHOLE BLOOD: 3.2 MMOL/L (ref 0.7–2.1)
LACTIC ACID, WHOLE BLOOD: 3.8 MMOL/L (ref 0.7–2.1)
LACTIC ACID, WHOLE BLOOD: 3.9 MMOL/L (ref 0.7–2.1)
LACTIC ACID, WHOLE BLOOD: 4.3 MMOL/L (ref 0.7–2.1)
LACTIC ACID, WHOLE BLOOD: 4.4 MMOL/L (ref 0.7–2.1)
LACTIC ACID, WHOLE BLOOD: NORMAL MMOL/L (ref 0.7–2.1)
LDH FLD L TO P-CCNC: 854 U/L
LDH SERPL-CCNC: 439 U/L (ref 135–225)
LDH SERPL-CCNC: 484 U/L (ref 135–225)
LDH SERPL-CCNC: 499 U/L (ref 135–225)
LEUKOCYTE ESTERASE UR QL STRIP: NEGATIVE
LYMPHOCYTES NFR BLD: 0.31 K/UL (ref 1.1–3.7)
LYMPHOCYTES NFR BLD: 0.33 K/UL (ref 1–4.8)
LYMPHOCYTES NFR BLD: 0.34 K/UL (ref 1–4.8)
LYMPHOCYTES NFR BLD: 0.6 K/UL (ref 1.1–3.7)
LYMPHOCYTES NFR BLD: 0.7 K/UL (ref 1–4.8)
LYMPHOCYTES NFR BLD: 0.95 K/UL (ref 1–4.8)
LYMPHOCYTES NFR BLD: 1.07 K/UL (ref 1–4.8)
LYMPHOCYTES NFR FLD: 38 %
LYMPHOCYTES RELATIVE PERCENT: 1 % (ref 24–43)
LYMPHOCYTES RELATIVE PERCENT: 1 % (ref 24–44)
LYMPHOCYTES RELATIVE PERCENT: 2 % (ref 24–43)
LYMPHOCYTES RELATIVE PERCENT: 2 % (ref 24–44)
LYMPHOCYTES RELATIVE PERCENT: 2 % (ref 24–44)
LYMPHOCYTES RELATIVE PERCENT: 3 % (ref 24–44)
LYMPHOCYTES RELATIVE PERCENT: 3 % (ref 24–44)
MCH RBC QN AUTO: 28.7 PG (ref 25.2–33.5)
MCH RBC QN AUTO: 29.3 PG (ref 25.2–33.5)
MCH RBC QN AUTO: 29.5 PG (ref 25.2–33.5)
MCH RBC QN AUTO: 29.6 PG (ref 25.2–33.5)
MCH RBC QN AUTO: 29.6 PG (ref 25.2–33.5)
MCH RBC QN AUTO: 29.8 PG (ref 25.2–33.5)
MCH RBC QN AUTO: 29.8 PG (ref 25.2–33.5)
MCH RBC QN AUTO: 30.1 PG (ref 25.2–33.5)
MCHC RBC AUTO-ENTMCNC: 30.8 G/DL (ref 28.4–34.8)
MCHC RBC AUTO-ENTMCNC: 32.6 G/DL (ref 28.4–34.8)
MCHC RBC AUTO-ENTMCNC: 32.8 G/DL (ref 28.4–34.8)
MCHC RBC AUTO-ENTMCNC: 33 G/DL (ref 28.4–34.8)
MCHC RBC AUTO-ENTMCNC: 33 G/DL (ref 28.4–34.8)
MCHC RBC AUTO-ENTMCNC: 33.2 G/DL (ref 28.4–34.8)
MCHC RBC AUTO-ENTMCNC: 33.8 G/DL (ref 28.4–34.8)
MCHC RBC AUTO-ENTMCNC: 34.3 G/DL (ref 28.4–34.8)
MCV RBC AUTO: 86.6 FL (ref 82.6–102.9)
MCV RBC AUTO: 86.9 FL (ref 82.6–102.9)
MCV RBC AUTO: 89.6 FL (ref 82.6–102.9)
MCV RBC AUTO: 89.6 FL (ref 82.6–102.9)
MCV RBC AUTO: 90.2 FL (ref 82.6–102.9)
MCV RBC AUTO: 90.8 FL (ref 82.6–102.9)
MCV RBC AUTO: 91.5 FL (ref 82.6–102.9)
MCV RBC AUTO: 93.3 FL (ref 82.6–102.9)
MESOTHELIAL CELLS BODY FLUID: 0 %
MICROORGANISM SPEC CULT: NORMAL
MICROORGANISM SPEC CULT: NORMAL
MODE: ABNORMAL
MONOCYTES NFR BLD: 0.69 K/UL (ref 0.1–0.8)
MONOCYTES NFR BLD: 0.9 K/UL (ref 0.1–1.2)
MONOCYTES NFR BLD: 0.92 K/UL (ref 0.1–1.2)
MONOCYTES NFR BLD: 1.04 K/UL (ref 0.1–0.8)
MONOCYTES NFR BLD: 1.07 K/UL (ref 0.1–0.8)
MONOCYTES NFR BLD: 1.58 K/UL (ref 0.1–0.8)
MONOCYTES NFR BLD: 1.63 K/UL (ref 0.1–0.8)
MONOCYTES NFR BLD: 3 % (ref 1–7)
MONOCYTES NFR BLD: 3 % (ref 1–7)
MONOCYTES NFR BLD: 3 % (ref 3–12)
MONOCYTES NFR BLD: 3 % (ref 3–12)
MONOCYTES NFR BLD: 4 % (ref 1–7)
MONOCYTES NFR BLD: 5 % (ref 1–7)
MONOCYTES NFR BLD: 5 % (ref 1–7)
MONOCYTES NFR FLD: 6 %
MORPHOLOGY: ABNORMAL
MRSA, DNA, NASAL: NEGATIVE
NEGATIVE BASE EXCESS, ART: 16.1 MMOL/L (ref 0–2)
NEGATIVE BASE EXCESS, ART: 7.3 MMOL/L (ref 0–2)
NEGATIVE BASE EXCESS, ART: 9.5 MMOL/L (ref 0–2)
NEGATIVE BASE EXCESS, VEN: 5.1 MMOL/L (ref 0–2)
NEGATIVE BASE EXCESS, VEN: 6.8 MMOL/L (ref 0–2)
NEGATIVE BASE EXCESS, VEN: 9 MMOL/L (ref 0–2)
NEUTROPHILS NFR BLD: 85 % (ref 36–66)
NEUTROPHILS NFR BLD: 87 % (ref 36–66)
NEUTROPHILS NFR BLD: 90 % (ref 36–65)
NEUTROPHILS NFR BLD: 91 % (ref 36–65)
NEUTROPHILS NFR BLD: 91 % (ref 36–66)
NEUTROPHILS NFR BLD: 91 % (ref 36–66)
NEUTROPHILS NFR BLD: 93 % (ref 36–66)
NEUTROPHILS NFR FLD: 56 %
NEUTS SEG NFR BLD: 14.62 K/UL (ref 1.8–7.7)
NEUTS SEG NFR BLD: 27 K/UL (ref 1.5–8.1)
NEUTS SEG NFR BLD: 27.49 K/UL (ref 1.8–7.7)
NEUTS SEG NFR BLD: 27.93 K/UL (ref 1.5–8.1)
NEUTS SEG NFR BLD: 30.21 K/UL (ref 1.8–7.7)
NEUTS SEG NFR BLD: 31.67 K/UL (ref 1.8–7.7)
NEUTS SEG NFR BLD: 32.49 K/UL (ref 1.8–7.7)
NITRITE UR QL STRIP: NEGATIVE
NRBC BLD-RTO: 0.1 PER 100 WBC
NRBC BLD-RTO: 0.5 PER 100 WBC
NRBC BLD-RTO: 0.9 PER 100 WBC
NRBC BLD-RTO: 1.2 PER 100 WBC
NRBC BLD-RTO: 13.8 PER 100 WBC
NRBC BLD-RTO: 7.2 PER 100 WBC
NUC CELL # FLD: 356 CELLS/UL
NUCLEAR IGG SER IA-RTO: 0.1 U/ML
NUCLEATED RED BLOOD CELLS: 1 PER 100 WBC
NUCLEATED RED BLOOD CELLS: 19 PER 100 WBC
NUCLEATED RED BLOOD CELLS: 2 PER 100 WBC
NUCLEATED RED BLOOD CELLS: 9 PER 100 WBC
O2 DELIVERY DEVICE: ABNORMAL
O2 DELIVERY DEVICE: ABNORMAL
O2 SAT, VEN: 40.6 % (ref 60–85)
O2 SAT, VEN: 43.8 % (ref 60–85)
O2 SAT, VEN: 94.8 % (ref 60–85)
OSMOLALITY SERPL: 268 MOSM/KG (ref 275–295)
OSMOLALITY SERPL: 313 MOSM/KG (ref 275–295)
OSMOLALITY UR: 324 MOSM/KG (ref 80–1300)
OSMOLALITY UR: 338 MOSM/KG (ref 80–1300)
P E INTERPRETATION, U: NORMAL
PARTIAL THROMBOPLASTIN TIME: 102.6 SEC (ref 23–36.5)
PARTIAL THROMBOPLASTIN TIME: 106 SEC (ref 23–36.5)
PARTIAL THROMBOPLASTIN TIME: 159.6 SEC (ref 23–36.5)
PARTIAL THROMBOPLASTIN TIME: 36 SEC (ref 23–36.5)
PARTIAL THROMBOPLASTIN TIME: 46.3 SEC (ref 23–36.5)
PARTIAL THROMBOPLASTIN TIME: 48.1 SEC (ref 23–36.5)
PARTIAL THROMBOPLASTIN TIME: 52.3 SEC (ref 23–36.5)
PARTIAL THROMBOPLASTIN TIME: 55.2 SEC (ref 23–36.5)
PARTIAL THROMBOPLASTIN TIME: 56.3 SEC (ref 23–36.5)
PARTIAL THROMBOPLASTIN TIME: 59.2 SEC (ref 23–36.5)
PARTIAL THROMBOPLASTIN TIME: 60 SEC (ref 23–36.5)
PARTIAL THROMBOPLASTIN TIME: 65.3 SEC (ref 23–36.5)
PARTIAL THROMBOPLASTIN TIME: 85.8 SEC (ref 23–36.5)
PARTIAL THROMBOPLASTIN TIME: 97.7 SEC (ref 23–36.5)
PARTIAL THROMBOPLASTIN TIME: >180 SEC (ref 23–36.5)
PATH REV BLD -IMP: NORMAL
PATH REV: NORMAL
PATHOLOGIST: ABNORMAL
PATHOLOGIST: NORMAL
PCO2 VENOUS: 28.8 MM HG (ref 39–55)
PCO2 VENOUS: 36.8 MM HG (ref 39–55)
PCO2 VENOUS: 41.2 MM HG (ref 41–51)
PH FLUID: 8
PH UR STRIP: 5 [PH] (ref 5–8)
PH VENOUS: 7.25 (ref 7.32–7.43)
PH VENOUS: 7.34 (ref 7.32–7.42)
PH VENOUS: 7.38 (ref 7.32–7.42)
PHOSPHATE SERPL-MCNC: 6 MG/DL (ref 2.5–4.5)
PLATELET # BLD AUTO: 133 K/UL (ref 138–453)
PLATELET # BLD AUTO: 85 K/UL (ref 138–453)
PLATELET # BLD AUTO: 93 K/UL (ref 138–453)
PLATELET # BLD AUTO: 95 K/UL (ref 138–453)
PLATELET # BLD AUTO: ABNORMAL K/UL (ref 138–453)
PLATELET, FLUORESCENCE: 103 K/UL (ref 138–453)
PLATELET, FLUORESCENCE: 124 K/UL (ref 138–453)
PLATELET, FLUORESCENCE: 19 K/UL (ref 138–453)
PLATELET, FLUORESCENCE: 74 K/UL (ref 138–453)
PLATELETS.RETICULATED NFR BLD AUTO: 13.3 % (ref 1.1–10.3)
PLATELETS.RETICULATED NFR BLD AUTO: 13.8 % (ref 1.1–10.3)
PLATELETS.RETICULATED NFR BLD AUTO: 5.4 % (ref 1.1–10.3)
PLATELETS.RETICULATED NFR BLD AUTO: 9 % (ref 1.1–10.3)
PMV BLD AUTO: 11.9 FL (ref 8.1–13.5)
PMV BLD AUTO: 12.4 FL (ref 8.1–13.5)
PMV BLD AUTO: 12.5 FL (ref 8.1–13.5)
PMV BLD AUTO: 12.7 FL (ref 8.1–13.5)
PO2 VENOUS: 26.4 MM HG (ref 30–50)
PO2 VENOUS: 28.4 MM HG (ref 30–50)
PO2 VENOUS: 73.9 MM HG (ref 30–50)
POC ANION GAP: 11 MMOL/L (ref 7–16)
POC ANION GAP: 15 MMOL/L (ref 7–16)
POC CREATININE: 3.4 MG/DL (ref 0.51–1.19)
POC CREATININE: NORMAL MG/DL (ref 0.51–1.19)
POC HCO3: 11.8 MMOL/L (ref 21–28)
POC HCO3: 16.3 MMOL/L (ref 21–28)
POC HCO3: 17.9 MMOL/L (ref 21–28)
POC HEMOGLOBIN (CALC): 13.2 G/DL (ref 13.5–17.5)
POC HEMOGLOBIN (CALC): 9.6 G/DL (ref 13.5–17.5)
POC LACTIC ACID: 14.4 MMOL/L (ref 0.56–1.39)
POC LACTIC ACID: 2.7 MMOL/L (ref 0.56–1.39)
POC LACTIC ACID: 8.7 MMOL/L (ref 0.56–1.39)
POC O2 SATURATION: 99.3 % (ref 94–98)
POC O2 SATURATION: 99.4 % (ref 94–98)
POC O2 SATURATION: 99.4 % (ref 94–98)
POC PCO2: 33.5 MM HG (ref 35–48)
POC PCO2: 34.4 MM HG (ref 35–48)
POC PCO2: 35.2 MM HG (ref 35–48)
POC PH: 7.13 (ref 7.35–7.45)
POC PH: 7.28 (ref 7.35–7.45)
POC PH: 7.34 (ref 7.35–7.45)
POC PO2: 167.6 MM HG (ref 83–108)
POC PO2: 174.2 MM HG (ref 83–108)
POC PO2: 193.9 MM HG (ref 83–108)
POTASSIUM BLD-SCNC: 6.4 MMOL/L (ref 3.5–4.5)
POTASSIUM BLD-SCNC: 7.2 MMOL/L (ref 3.5–5.1)
POTASSIUM SERPL-SCNC: 4.9 MMOL/L (ref 3.7–5.3)
POTASSIUM SERPL-SCNC: 4.9 MMOL/L (ref 3.7–5.3)
POTASSIUM SERPL-SCNC: 5.3 MMOL/L (ref 3.7–5.3)
POTASSIUM SERPL-SCNC: 5.7 MMOL/L (ref 3.7–5.3)
POTASSIUM SERPL-SCNC: 5.9 MMOL/L (ref 3.7–5.3)
POTASSIUM SERPL-SCNC: 5.9 MMOL/L (ref 3.7–5.3)
POTASSIUM SERPL-SCNC: 6 MMOL/L (ref 3.7–5.3)
POTASSIUM SERPL-SCNC: 6.1 MMOL/L (ref 3.7–5.3)
POTASSIUM SERPL-SCNC: 6.3 MMOL/L (ref 3.7–5.3)
POTASSIUM SERPL-SCNC: 7 MMOL/L (ref 3.7–5.3)
POTASSIUM SERPL-SCNC: 7.3 MMOL/L (ref 3.7–5.3)
POTASSIUM SERPL-SCNC: 7.4 MMOL/L (ref 3.7–5.3)
PROCALCITONIN SERPL-MCNC: 14 NG/ML (ref 0–0.09)
PROCALCITONIN SERPL-MCNC: 9.15 NG/ML (ref 0–0.09)
PROT FLD-MCNC: 3.2 G/DL
PROT PATTERN SERPL ELPH-IMP: ABNORMAL
PROT SERPL-MCNC: 4.3 G/DL (ref 6.6–8.7)
PROT SERPL-MCNC: 4.7 G/DL (ref 6.6–8.7)
PROT SERPL-MCNC: 5 G/DL (ref 6.6–8.7)
PROT SERPL-MCNC: 5.3 G/DL (ref 6.6–8.7)
PROT SERPL-MCNC: 5.9 G/DL (ref 6.6–8.7)
PROT UR STRIP-MCNC: ABNORMAL MG/DL
PROTHROMBIN TIME: 22.3 SEC (ref 11.7–14.9)
PROTHROMBIN TIME: 37.9 SEC (ref 11.7–14.9)
PROTHROMBIN TIME: 45.6 SEC (ref 11.7–14.9)
RBC # BLD AUTO: 2.23 M/UL (ref 4.21–5.77)
RBC # BLD AUTO: 2.29 M/UL (ref 4.21–5.77)
RBC # BLD AUTO: 2.82 M/UL (ref 4.21–5.77)
RBC # BLD AUTO: 3.05 M/UL (ref 4.21–5.77)
RBC # BLD AUTO: 3.18 M/UL (ref 4.21–5.77)
RBC # BLD AUTO: 3.25 M/UL (ref 4.21–5.77)
RBC # BLD AUTO: 3.35 M/UL (ref 4.21–5.77)
RBC # BLD AUTO: 3.82 M/UL (ref 4.21–5.77)
RBC # FLD: NORMAL CELLS/UL
RBC #/AREA URNS HPF: NORMAL /HPF (ref 0–2)
RETIC HEMOGLOBIN: 33 PG (ref 28.2–35.7)
RETICS # AUTO: 0.12 M/UL (ref 0.03–0.08)
RETICS/RBC NFR AUTO: 3.8 % (ref 0.5–1.9)
SAMPLE SITE: ABNORMAL
SAMPLE SITE: ABNORMAL
SERVICE CMNT-IMP: NORMAL
SERVICE CMNT-IMP: NORMAL
SODIUM BLD-SCNC: 115 MMOL/L (ref 138–146)
SODIUM BLD-SCNC: 121 MMOL/L (ref 138–146)
SODIUM SERPL-SCNC: 116 MMOL/L (ref 136–145)
SODIUM SERPL-SCNC: 116 MMOL/L (ref 136–145)
SODIUM SERPL-SCNC: 117 MMOL/L (ref 136–145)
SODIUM SERPL-SCNC: 120 MMOL/L (ref 136–145)
SODIUM SERPL-SCNC: 121 MMOL/L (ref 136–145)
SODIUM SERPL-SCNC: 123 MMOL/L (ref 136–145)
SODIUM SERPL-SCNC: 124 MMOL/L (ref 136–145)
SODIUM SERPL-SCNC: 124 MMOL/L (ref 136–145)
SODIUM SERPL-SCNC: 126 MMOL/L (ref 136–145)
SODIUM SERPL-SCNC: 129 MMOL/L (ref 136–145)
SODIUM UR-SCNC: <20 MMOL/L
SODIUM UR-SCNC: <20 MMOL/L
SP GR UR STRIP: 1.02 (ref 1–1.03)
SPECIMEN DESCRIPTION: NORMAL
SPECIMEN SOURCE: NORMAL
SPECIMEN TYPE: NORMAL
SURGICAL PATHOLOGY REPORT: NORMAL
T4 FREE SERPL-MCNC: 0.6 NG/DL (ref 0.92–1.68)
TOTAL PROT. SUM,%: 100 % (ref 98–102)
TOTAL PROT. SUM: 4.8 G/DL (ref 6.3–8.2)
TOTAL PROTEIN, URINE: 118 MG/DL
TRANSFUSION STATUS: NORMAL
TRANSFUSION STATUS: NORMAL
TRIGL SERPL-MCNC: 169 MG/DL
TROPONIN I SERPL HS-MCNC: 15 NG/L (ref 0–22)
TROPONIN I SERPL HS-MCNC: 16 NG/L (ref 0–22)
TROPONIN I SERPL HS-MCNC: 19 NG/L (ref 0–22)
TROPONIN I SERPL HS-MCNC: 34 NG/L (ref 0–22)
TSH SERPL DL<=0.05 MIU/L-ACNC: 9.7 UIU/ML (ref 0.27–4.2)
UNIDENT CELLS NFR FLD: 0 %
UNIT DIVISION: 0
UNIT DIVISION: 0
UNIT ISSUE DATE/TIME: NORMAL
URATE SERPL-MCNC: 9.4 MG/DL (ref 3.4–7)
URINE TOTAL PROTEIN: 54 MG/DL
UROBILINOGEN UR STRIP-ACNC: NORMAL EU/DL (ref 0–1)
VANCOMYCIN SERPL-MCNC: 15.4 UG/ML (ref 5–40)
WBC #/AREA URNS HPF: NORMAL /HPF (ref 0–5)
WBC OTHER # BLD: 17.2 K/UL (ref 3.5–11.3)
WBC OTHER # BLD: 28.6 K/UL (ref 3.5–11.3)
WBC OTHER # BLD: 30 K/UL (ref 3.5–11.3)
WBC OTHER # BLD: 30.7 K/UL (ref 3.5–11.3)
WBC OTHER # BLD: 31.6 K/UL (ref 3.5–11.3)
WBC OTHER # BLD: 32.5 K/UL (ref 3.5–11.3)
WBC OTHER # BLD: 34.8 K/UL (ref 3.5–11.3)
WBC OTHER # BLD: 35.7 K/UL (ref 3.5–11.3)

## 2024-01-01 PROCEDURE — 2580000003 HC RX 258

## 2024-01-01 PROCEDURE — 94640 AIRWAY INHALATION TREATMENT: CPT

## 2024-01-01 PROCEDURE — 6370000000 HC RX 637 (ALT 250 FOR IP)

## 2024-01-01 PROCEDURE — 6360000002 HC RX W HCPCS

## 2024-01-01 PROCEDURE — 36415 COLL VENOUS BLD VENIPUNCTURE: CPT

## 2024-01-01 PROCEDURE — 6360000002 HC RX W HCPCS: Performed by: STUDENT IN AN ORGANIZED HEALTH CARE EDUCATION/TRAINING PROGRAM

## 2024-01-01 PROCEDURE — 84311 SPECTROPHOTOMETRY: CPT

## 2024-01-01 PROCEDURE — 80048 BASIC METABOLIC PNL TOTAL CA: CPT

## 2024-01-01 PROCEDURE — 6360000002 HC RX W HCPCS: Performed by: INTERNAL MEDICINE

## 2024-01-01 PROCEDURE — 83935 ASSAY OF URINE OSMOLALITY: CPT

## 2024-01-01 PROCEDURE — 90935 HEMODIALYSIS ONE EVALUATION: CPT

## 2024-01-01 PROCEDURE — 06HY33Z INSERTION OF INFUSION DEVICE INTO LOWER VEIN, PERCUTANEOUS APPROACH: ICD-10-PCS | Performed by: RADIOLOGY

## 2024-01-01 PROCEDURE — 2580000003 HC RX 258: Performed by: PHYSICIAN ASSISTANT

## 2024-01-01 PROCEDURE — 2500000003 HC RX 250 WO HCPCS: Performed by: SURGERY

## 2024-01-01 PROCEDURE — 85730 THROMBOPLASTIN TIME PARTIAL: CPT

## 2024-01-01 PROCEDURE — 86880 COOMBS TEST DIRECT: CPT

## 2024-01-01 PROCEDURE — 94761 N-INVAS EAR/PLS OXIMETRY MLT: CPT

## 2024-01-01 PROCEDURE — 99291 CRITICAL CARE FIRST HOUR: CPT | Performed by: INTERNAL MEDICINE

## 2024-01-01 PROCEDURE — P9047 ALBUMIN (HUMAN), 25%, 50ML: HCPCS | Performed by: INTERNAL MEDICINE

## 2024-01-01 PROCEDURE — 83930 ASSAY OF BLOOD OSMOLALITY: CPT

## 2024-01-01 PROCEDURE — 36556 INSERT NON-TUNNEL CV CATH: CPT

## 2024-01-01 PROCEDURE — 86900 BLOOD TYPING SEROLOGIC ABO: CPT

## 2024-01-01 PROCEDURE — 2700000000 HC OXYGEN THERAPY PER DAY

## 2024-01-01 PROCEDURE — 88342 IMHCHEM/IMCYTCHM 1ST ANTB: CPT

## 2024-01-01 PROCEDURE — 85384 FIBRINOGEN ACTIVITY: CPT

## 2024-01-01 PROCEDURE — 82805 BLOOD GASES W/O2 SATURATION: CPT

## 2024-01-01 PROCEDURE — 80202 ASSAY OF VANCOMYCIN: CPT

## 2024-01-01 PROCEDURE — 2580000003 HC RX 258: Performed by: INTERNAL MEDICINE

## 2024-01-01 PROCEDURE — 82803 BLOOD GASES ANY COMBINATION: CPT

## 2024-01-01 PROCEDURE — 0W9G3ZZ DRAINAGE OF PERITONEAL CAVITY, PERCUTANEOUS APPROACH: ICD-10-PCS | Performed by: RADIOLOGY

## 2024-01-01 PROCEDURE — 2000000000 HC ICU R&B

## 2024-01-01 PROCEDURE — 2060000000 HC ICU INTERMEDIATE R&B

## 2024-01-01 PROCEDURE — 99285 EMERGENCY DEPT VISIT HI MDM: CPT

## 2024-01-01 PROCEDURE — 86901 BLOOD TYPING SEROLOGIC RH(D): CPT

## 2024-01-01 PROCEDURE — 99232 SBSQ HOSP IP/OBS MODERATE 35: CPT | Performed by: INTERNAL MEDICINE

## 2024-01-01 PROCEDURE — 04HY32Z INSERTION OF MONITORING DEVICE INTO LOWER ARTERY, PERCUTANEOUS APPROACH: ICD-10-PCS | Performed by: INTERNAL MEDICINE

## 2024-01-01 PROCEDURE — 85610 PROTHROMBIN TIME: CPT

## 2024-01-01 PROCEDURE — 6360000004 HC RX CONTRAST MEDICATION: Performed by: INTERNAL MEDICINE

## 2024-01-01 PROCEDURE — 80053 COMPREHEN METABOLIC PANEL: CPT

## 2024-01-01 PROCEDURE — 85025 COMPLETE CBC W/AUTO DIFF WBC: CPT

## 2024-01-01 PROCEDURE — 6370000000 HC RX 637 (ALT 250 FOR IP): Performed by: NURSE PRACTITIONER

## 2024-01-01 PROCEDURE — 70498 CT ANGIOGRAPHY NECK: CPT

## 2024-01-01 PROCEDURE — 6360000004 HC RX CONTRAST MEDICATION: Performed by: EMERGENCY MEDICINE

## 2024-01-01 PROCEDURE — 85014 HEMATOCRIT: CPT

## 2024-01-01 PROCEDURE — 99223 1ST HOSP IP/OBS HIGH 75: CPT | Performed by: INTERNAL MEDICINE

## 2024-01-01 PROCEDURE — 84157 ASSAY OF PROTEIN OTHER: CPT

## 2024-01-01 PROCEDURE — 2500000003 HC RX 250 WO HCPCS: Performed by: NURSE PRACTITIONER

## 2024-01-01 PROCEDURE — 84520 ASSAY OF UREA NITROGEN: CPT

## 2024-01-01 PROCEDURE — 88305 TISSUE EXAM BY PATHOLOGIST: CPT

## 2024-01-01 PROCEDURE — 2500000003 HC RX 250 WO HCPCS

## 2024-01-01 PROCEDURE — 36600 WITHDRAWAL OF ARTERIAL BLOOD: CPT

## 2024-01-01 PROCEDURE — 85018 HEMOGLOBIN: CPT

## 2024-01-01 PROCEDURE — 80051 ELECTROLYTE PANEL: CPT

## 2024-01-01 PROCEDURE — 37799 UNLISTED PX VASCULAR SURGERY: CPT

## 2024-01-01 PROCEDURE — 96365 THER/PROPH/DIAG IV INF INIT: CPT

## 2024-01-01 PROCEDURE — 84165 PROTEIN E-PHORESIS SERUM: CPT

## 2024-01-01 PROCEDURE — 6360000002 HC RX W HCPCS: Performed by: NURSE PRACTITIONER

## 2024-01-01 PROCEDURE — 84156 ASSAY OF PROTEIN URINE: CPT

## 2024-01-01 PROCEDURE — 82010 KETONE BODYS QUAN: CPT

## 2024-01-01 PROCEDURE — 70450 CT HEAD/BRAIN W/O DYE: CPT

## 2024-01-01 PROCEDURE — 80074 ACUTE HEPATITIS PANEL: CPT

## 2024-01-01 PROCEDURE — 6370000000 HC RX 637 (ALT 250 FOR IP): Performed by: INTERNAL MEDICINE

## 2024-01-01 PROCEDURE — 83615 LACTATE (LD) (LDH) ENZYME: CPT

## 2024-01-01 PROCEDURE — 3600000002 HC SURGERY LEVEL 2 BASE: Performed by: SURGERY

## 2024-01-01 PROCEDURE — 88341 IMHCHEM/IMCYTCHM EA ADD ANTB: CPT

## 2024-01-01 PROCEDURE — 85055 RETICULATED PLATELET ASSAY: CPT

## 2024-01-01 PROCEDURE — P9016 RBC LEUKOCYTES REDUCED: HCPCS

## 2024-01-01 PROCEDURE — 49083 ABD PARACENTESIS W/IMAGING: CPT

## 2024-01-01 PROCEDURE — 86927 PLASMA FRESH FROZEN: CPT

## 2024-01-01 PROCEDURE — 81001 URINALYSIS AUTO W/SCOPE: CPT

## 2024-01-01 PROCEDURE — 82330 ASSAY OF CALCIUM: CPT

## 2024-01-01 PROCEDURE — 82947 ASSAY GLUCOSE BLOOD QUANT: CPT

## 2024-01-01 PROCEDURE — 85520 HEPARIN ASSAY: CPT

## 2024-01-01 PROCEDURE — 83010 ASSAY OF HAPTOGLOBIN QUANT: CPT

## 2024-01-01 PROCEDURE — 84155 ASSAY OF PROTEIN SERUM: CPT

## 2024-01-01 PROCEDURE — 71045 X-RAY EXAM CHEST 1 VIEW: CPT

## 2024-01-01 PROCEDURE — 83986 ASSAY PH BODY FLUID NOS: CPT

## 2024-01-01 PROCEDURE — 82565 ASSAY OF CREATININE: CPT

## 2024-01-01 PROCEDURE — 94660 CPAP INITIATION&MGMT: CPT

## 2024-01-01 PROCEDURE — 86140 C-REACTIVE PROTEIN: CPT

## 2024-01-01 PROCEDURE — 87040 BLOOD CULTURE FOR BACTERIA: CPT

## 2024-01-01 PROCEDURE — 99254 IP/OBS CNSLTJ NEW/EST MOD 60: CPT | Performed by: PSYCHIATRY & NEUROLOGY

## 2024-01-01 PROCEDURE — 2580000003 HC RX 258: Performed by: STUDENT IN AN ORGANIZED HEALTH CARE EDUCATION/TRAINING PROGRAM

## 2024-01-01 PROCEDURE — G0480 DRUG TEST DEF 1-7 CLASSES: HCPCS

## 2024-01-01 PROCEDURE — 74018 RADEX ABDOMEN 1 VIEW: CPT

## 2024-01-01 PROCEDURE — 99232 SBSQ HOSP IP/OBS MODERATE 35: CPT | Performed by: PSYCHIATRY & NEUROLOGY

## 2024-01-01 PROCEDURE — 5A1D90Z PERFORMANCE OF URINARY FILTRATION, CONTINUOUS, GREATER THAN 18 HOURS PER DAY: ICD-10-PCS | Performed by: INTERNAL MEDICINE

## 2024-01-01 PROCEDURE — 93010 ELECTROCARDIOGRAM REPORT: CPT | Performed by: INTERNAL MEDICINE

## 2024-01-01 PROCEDURE — 86334 IMMUNOFIX E-PHORESIS SERUM: CPT

## 2024-01-01 PROCEDURE — 84295 ASSAY OF SERUM SODIUM: CPT

## 2024-01-01 PROCEDURE — 5A1D70Z PERFORMANCE OF URINARY FILTRATION, INTERMITTENT, LESS THAN 6 HOURS PER DAY: ICD-10-PCS | Performed by: INTERNAL MEDICINE

## 2024-01-01 PROCEDURE — 86850 RBC ANTIBODY SCREEN: CPT

## 2024-01-01 PROCEDURE — 84166 PROTEIN E-PHORESIS/URINE/CSF: CPT

## 2024-01-01 PROCEDURE — 36556 INSERT NON-TUNNEL CV CATH: CPT | Performed by: INTERNAL MEDICINE

## 2024-01-01 PROCEDURE — 85045 AUTOMATED RETICULOCYTE COUNT: CPT

## 2024-01-01 PROCEDURE — 94664 DEMO&/EVAL PT USE INHALER: CPT

## 2024-01-01 PROCEDURE — 84478 ASSAY OF TRIGLYCERIDES: CPT

## 2024-01-01 PROCEDURE — 87075 CULTR BACTERIA EXCEPT BLOOD: CPT

## 2024-01-01 PROCEDURE — 84439 ASSAY OF FREE THYROXINE: CPT

## 2024-01-01 PROCEDURE — 93005 ELECTROCARDIOGRAM TRACING: CPT

## 2024-01-01 PROCEDURE — 83605 ASSAY OF LACTIC ACID: CPT

## 2024-01-01 PROCEDURE — 82042 OTHER SOURCE ALBUMIN QUAN EA: CPT

## 2024-01-01 PROCEDURE — 5A09357 ASSISTANCE WITH RESPIRATORY VENTILATION, LESS THAN 24 CONSECUTIVE HOURS, CONTINUOUS POSITIVE AIRWAY PRESSURE: ICD-10-PCS | Performed by: INTERNAL MEDICINE

## 2024-01-01 PROCEDURE — 99233 SBSQ HOSP IP/OBS HIGH 50: CPT | Performed by: INTERNAL MEDICINE

## 2024-01-01 PROCEDURE — 87070 CULTURE OTHR SPECIMN AEROBIC: CPT

## 2024-01-01 PROCEDURE — 84484 ASSAY OF TROPONIN QUANT: CPT

## 2024-01-01 PROCEDURE — 87641 MR-STAPH DNA AMP PROBE: CPT

## 2024-01-01 PROCEDURE — 2709999900 HC NON-CHARGEABLE SUPPLY: Performed by: SURGERY

## 2024-01-01 PROCEDURE — 71260 CT THORAX DX C+: CPT

## 2024-01-01 PROCEDURE — 82570 ASSAY OF URINE CREATININE: CPT

## 2024-01-01 PROCEDURE — 36430 TRANSFUSION BLD/BLD COMPNT: CPT

## 2024-01-01 PROCEDURE — 82533 TOTAL CORTISOL: CPT

## 2024-01-01 PROCEDURE — 82248 BILIRUBIN DIRECT: CPT

## 2024-01-01 PROCEDURE — 2709999900 US GUIDED PARACENTESIS

## 2024-01-01 PROCEDURE — 96375 TX/PRO/DX INJ NEW DRUG ADDON: CPT

## 2024-01-01 PROCEDURE — 36620 INSERTION CATHETER ARTERY: CPT | Performed by: INTERNAL MEDICINE

## 2024-01-01 PROCEDURE — 84145 PROCALCITONIN (PCT): CPT

## 2024-01-01 PROCEDURE — 2580000003 HC RX 258: Performed by: EMERGENCY MEDICINE

## 2024-01-01 PROCEDURE — 6360000002 HC RX W HCPCS: Performed by: PHYSICIAN ASSISTANT

## 2024-01-01 PROCEDURE — 84300 ASSAY OF URINE SODIUM: CPT

## 2024-01-01 PROCEDURE — 89051 BODY FLUID CELL COUNT: CPT

## 2024-01-01 PROCEDURE — 88112 CYTOPATH CELL ENHANCE TECH: CPT

## 2024-01-01 PROCEDURE — 76770 US EXAM ABDO BACK WALL COMP: CPT

## 2024-01-01 PROCEDURE — 0JH63XZ INSERTION OF TUNNELED VASCULAR ACCESS DEVICE INTO CHEST SUBCUTANEOUS TISSUE AND FASCIA, PERCUTANEOUS APPROACH: ICD-10-PCS | Performed by: RADIOLOGY

## 2024-01-01 PROCEDURE — 30233L1 TRANSFUSION OF NONAUTOLOGOUS FRESH PLASMA INTO PERIPHERAL VEIN, PERCUTANEOUS APPROACH: ICD-10-PCS | Performed by: INTERNAL MEDICINE

## 2024-01-01 PROCEDURE — 3600000012 HC SURGERY LEVEL 2 ADDTL 15MIN: Performed by: SURGERY

## 2024-01-01 PROCEDURE — 86038 ANTINUCLEAR ANTIBODIES: CPT

## 2024-01-01 PROCEDURE — 7100000011 HC PHASE II RECOVERY - ADDTL 15 MIN: Performed by: SURGERY

## 2024-01-01 PROCEDURE — 36558 INSERT TUNNELED CV CATH: CPT

## 2024-01-01 PROCEDURE — 84550 ASSAY OF BLOOD/URIC ACID: CPT

## 2024-01-01 PROCEDURE — 77001 FLUOROGUIDE FOR VEIN DEVICE: CPT

## 2024-01-01 PROCEDURE — 84443 ASSAY THYROID STIM HORMONE: CPT

## 2024-01-01 PROCEDURE — 6360000002 HC RX W HCPCS: Performed by: EMERGENCY MEDICINE

## 2024-01-01 PROCEDURE — 87205 SMEAR GRAM STAIN: CPT

## 2024-01-01 PROCEDURE — C1769 GUIDE WIRE: HCPCS

## 2024-01-01 PROCEDURE — 84100 ASSAY OF PHOSPHORUS: CPT

## 2024-01-01 PROCEDURE — P9017 PLASMA 1 DONOR FRZ W/IN 8 HR: HCPCS

## 2024-01-01 PROCEDURE — 11622 EXC S/N/H/F/G MAL+MRG 1.1-2: CPT | Performed by: SURGERY

## 2024-01-01 PROCEDURE — 86923 COMPATIBILITY TEST ELECTRIC: CPT

## 2024-01-01 PROCEDURE — 3E043XZ INTRODUCTION OF VASOPRESSOR INTO CENTRAL VEIN, PERCUTANEOUS APPROACH: ICD-10-PCS

## 2024-01-01 PROCEDURE — 99255 IP/OBS CONSLTJ NEW/EST HI 80: CPT | Performed by: INTERNAL MEDICINE

## 2024-01-01 PROCEDURE — 99291 CRITICAL CARE FIRST HOUR: CPT | Performed by: PSYCHIATRY & NEUROLOGY

## 2024-01-01 PROCEDURE — 30233N1 TRANSFUSION OF NONAUTOLOGOUS RED BLOOD CELLS INTO PERIPHERAL VEIN, PERCUTANEOUS APPROACH: ICD-10-PCS | Performed by: INTERNAL MEDICINE

## 2024-01-01 PROCEDURE — 86225 DNA ANTIBODY NATIVE: CPT

## 2024-01-01 PROCEDURE — 86317 IMMUNOASSAY INFECTIOUS AGENT: CPT

## 2024-01-01 PROCEDURE — 7100000010 HC PHASE II RECOVERY - FIRST 15 MIN: Performed by: SURGERY

## 2024-01-01 PROCEDURE — 82247 BILIRUBIN TOTAL: CPT

## 2024-01-01 PROCEDURE — 90935 HEMODIALYSIS ONE EVALUATION: CPT | Performed by: INTERNAL MEDICINE

## 2024-01-01 PROCEDURE — 2500000003 HC RX 250 WO HCPCS: Performed by: INTERNAL MEDICINE

## 2024-01-01 PROCEDURE — 85027 COMPLETE CBC AUTOMATED: CPT

## 2024-01-01 RX ORDER — HEPARIN SODIUM 1000 [USP'U]/ML
80 INJECTION, SOLUTION INTRAVENOUS; SUBCUTANEOUS ONCE
Status: COMPLETED | OUTPATIENT
Start: 2024-01-01 | End: 2024-01-01

## 2024-01-01 RX ORDER — IPRATROPIUM BROMIDE AND ALBUTEROL SULFATE 2.5; .5 MG/3ML; MG/3ML
1 SOLUTION RESPIRATORY (INHALATION)
Status: COMPLETED | OUTPATIENT
Start: 2024-01-01 | End: 2024-01-01

## 2024-01-01 RX ORDER — ACETAMINOPHEN 650 MG/1
650 SUPPOSITORY RECTAL EVERY 6 HOURS PRN
Status: DISCONTINUED | OUTPATIENT
Start: 2024-01-01 | End: 2024-01-01 | Stop reason: HOSPADM

## 2024-01-01 RX ORDER — HEPARIN SODIUM 1000 [USP'U]/ML
1600 INJECTION, SOLUTION INTRAVENOUS; SUBCUTANEOUS ONCE
Status: COMPLETED | OUTPATIENT
Start: 2024-01-01 | End: 2024-01-01

## 2024-01-01 RX ORDER — SODIUM CHLORIDE 1 G/1
2 TABLET ORAL 2 TIMES DAILY WITH MEALS
Status: DISCONTINUED | OUTPATIENT
Start: 2024-01-01 | End: 2024-01-01

## 2024-01-01 RX ORDER — ALBUMIN (HUMAN) 12.5 G/50ML
12.5 SOLUTION INTRAVENOUS PRN
Status: COMPLETED | OUTPATIENT
Start: 2024-01-01 | End: 2024-01-01

## 2024-01-01 RX ORDER — HEPARIN SODIUM 1000 [USP'U]/ML
40 INJECTION, SOLUTION INTRAVENOUS; SUBCUTANEOUS PRN
Status: DISCONTINUED | OUTPATIENT
Start: 2024-01-01 | End: 2024-01-01

## 2024-01-01 RX ORDER — MIDODRINE HYDROCHLORIDE 5 MG/1
10 TABLET ORAL PRN
Status: DISCONTINUED | OUTPATIENT
Start: 2024-01-01 | End: 2024-01-01

## 2024-01-01 RX ORDER — NOREPINEPHRINE BITARTRATE 0.06 MG/ML
1-100 INJECTION, SOLUTION INTRAVENOUS CONTINUOUS
Status: DISCONTINUED | OUTPATIENT
Start: 2024-01-01 | End: 2024-01-01

## 2024-01-01 RX ORDER — HEPARIN SODIUM 5000 [USP'U]/ML
5000 INJECTION, SOLUTION INTRAVENOUS; SUBCUTANEOUS ONCE
Status: DISCONTINUED | OUTPATIENT
Start: 2024-01-01 | End: 2024-01-01 | Stop reason: ALTCHOICE

## 2024-01-01 RX ORDER — HEPARIN SODIUM 1000 [USP'U]/ML
80 INJECTION, SOLUTION INTRAVENOUS; SUBCUTANEOUS PRN
Status: DISCONTINUED | OUTPATIENT
Start: 2024-01-01 | End: 2024-01-01 | Stop reason: HOSPADM

## 2024-01-01 RX ORDER — DEXMEDETOMIDINE HYDROCHLORIDE 4 UG/ML
.1-1.5 INJECTION, SOLUTION INTRAVENOUS CONTINUOUS
Status: DISCONTINUED | OUTPATIENT
Start: 2024-01-01 | End: 2024-01-01 | Stop reason: HOSPADM

## 2024-01-01 RX ORDER — 0.9 % SODIUM CHLORIDE 0.9 %
500 INTRAVENOUS SOLUTION INTRAVENOUS ONCE
Status: COMPLETED | OUTPATIENT
Start: 2024-01-01 | End: 2024-01-01

## 2024-01-01 RX ORDER — FENTANYL CITRATE 50 UG/ML
50 INJECTION, SOLUTION INTRAMUSCULAR; INTRAVENOUS ONCE
Status: COMPLETED | OUTPATIENT
Start: 2024-01-01 | End: 2024-01-01

## 2024-01-01 RX ORDER — IPRATROPIUM BROMIDE AND ALBUTEROL SULFATE 2.5; .5 MG/3ML; MG/3ML
1 SOLUTION RESPIRATORY (INHALATION)
Status: DISCONTINUED | OUTPATIENT
Start: 2024-01-01 | End: 2024-01-01

## 2024-01-01 RX ORDER — MIDODRINE HYDROCHLORIDE 5 MG/1
10 TABLET ORAL 3 TIMES DAILY
Status: DISCONTINUED | OUTPATIENT
Start: 2024-01-01 | End: 2024-01-01 | Stop reason: HOSPADM

## 2024-01-01 RX ORDER — ALBUMIN (HUMAN) 12.5 G/50ML
25 SOLUTION INTRAVENOUS PRN
Status: DISCONTINUED | OUTPATIENT
Start: 2024-01-01 | End: 2024-01-01

## 2024-01-01 RX ORDER — HEPARIN SODIUM 1000 [USP'U]/ML
80 INJECTION, SOLUTION INTRAVENOUS; SUBCUTANEOUS ONCE
Status: DISCONTINUED | OUTPATIENT
Start: 2024-01-01 | End: 2024-01-01

## 2024-01-01 RX ORDER — MIDODRINE HYDROCHLORIDE 5 MG/1
5 TABLET ORAL 3 TIMES DAILY
Status: DISCONTINUED | OUTPATIENT
Start: 2024-01-01 | End: 2024-01-01

## 2024-01-01 RX ORDER — CEPHALEXIN 500 MG/1
CAPSULE ORAL
Qty: 21 CAPSULE | Refills: 0 | Status: ON HOLD | OUTPATIENT
Start: 2024-01-01 | End: 2024-01-01

## 2024-01-01 RX ORDER — BUPIVACAINE HYDROCHLORIDE AND EPINEPHRINE 5; 5 MG/ML; UG/ML
INJECTION, SOLUTION EPIDURAL; INTRACAUDAL; PERINEURAL PRN
Status: DISCONTINUED | OUTPATIENT
Start: 2024-01-01 | End: 2024-01-01 | Stop reason: ALTCHOICE

## 2024-01-01 RX ORDER — HEPARIN SODIUM 1000 [USP'U]/ML
80 INJECTION, SOLUTION INTRAVENOUS; SUBCUTANEOUS PRN
Status: DISCONTINUED | OUTPATIENT
Start: 2024-01-01 | End: 2024-01-01

## 2024-01-01 RX ORDER — SODIUM CHLORIDE 0.9 % (FLUSH) 0.9 %
5-40 SYRINGE (ML) INJECTION PRN
Status: DISCONTINUED | OUTPATIENT
Start: 2024-01-01 | End: 2024-01-01 | Stop reason: HOSPADM

## 2024-01-01 RX ORDER — VASOPRESSIN IN DEXTROSE 5 % 20/100 ML
.01-.03 PLASTIC BAG, INJECTION (ML) INTRAVENOUS CONTINUOUS
Status: DISCONTINUED | OUTPATIENT
Start: 2024-01-01 | End: 2024-01-01

## 2024-01-01 RX ORDER — OXYCODONE HYDROCHLORIDE 5 MG/1
2.5 TABLET ORAL ONCE
Status: COMPLETED | OUTPATIENT
Start: 2024-01-01 | End: 2024-01-01

## 2024-01-01 RX ORDER — ONDANSETRON 2 MG/ML
4 INJECTION INTRAMUSCULAR; INTRAVENOUS EVERY 6 HOURS PRN
Status: DISCONTINUED | OUTPATIENT
Start: 2024-01-01 | End: 2024-01-01 | Stop reason: HOSPADM

## 2024-01-01 RX ORDER — MIDODRINE HYDROCHLORIDE 5 MG/1
10 TABLET ORAL ONCE
Status: COMPLETED | OUTPATIENT
Start: 2024-01-01 | End: 2024-01-01

## 2024-01-01 RX ORDER — OXYCODONE HYDROCHLORIDE 5 MG/1
5 TABLET ORAL EVERY 4 HOURS PRN
Status: DISCONTINUED | OUTPATIENT
Start: 2024-01-01 | End: 2024-01-01 | Stop reason: HOSPADM

## 2024-01-01 RX ORDER — OXYCODONE HYDROCHLORIDE 5 MG/1
5 TABLET ORAL EVERY 6 HOURS PRN
Status: DISCONTINUED | OUTPATIENT
Start: 2024-01-01 | End: 2024-01-01

## 2024-01-01 RX ORDER — HEPARIN SODIUM 1000 [USP'U]/ML
2300 INJECTION, SOLUTION INTRAVENOUS; SUBCUTANEOUS PRN
Status: DISCONTINUED | OUTPATIENT
Start: 2024-01-01 | End: 2024-01-01 | Stop reason: HOSPADM

## 2024-01-01 RX ORDER — ACETAMINOPHEN 325 MG/1
650 TABLET ORAL EVERY 6 HOURS PRN
Status: DISCONTINUED | OUTPATIENT
Start: 2024-01-01 | End: 2024-01-01 | Stop reason: HOSPADM

## 2024-01-01 RX ORDER — ATORVASTATIN CALCIUM 40 MG/1
40 TABLET, FILM COATED ORAL NIGHTLY
Status: DISCONTINUED | OUTPATIENT
Start: 2024-01-01 | End: 2024-01-01 | Stop reason: HOSPADM

## 2024-01-01 RX ORDER — ONDANSETRON 4 MG/1
4 TABLET, ORALLY DISINTEGRATING ORAL EVERY 8 HOURS PRN
Status: DISCONTINUED | OUTPATIENT
Start: 2024-01-01 | End: 2024-01-01 | Stop reason: HOSPADM

## 2024-01-01 RX ORDER — IPRATROPIUM BROMIDE AND ALBUTEROL SULFATE 2.5; .5 MG/3ML; MG/3ML
1 SOLUTION RESPIRATORY (INHALATION) 2 TIMES DAILY
Status: DISCONTINUED | OUTPATIENT
Start: 2024-01-01 | End: 2024-01-01 | Stop reason: HOSPADM

## 2024-01-01 RX ORDER — IOPAMIDOL 755 MG/ML
75 INJECTION, SOLUTION INTRAVASCULAR
Status: COMPLETED | OUTPATIENT
Start: 2024-01-01 | End: 2024-01-01

## 2024-01-01 RX ORDER — HEPARIN SODIUM 1000 [USP'U]/ML
1900 INJECTION, SOLUTION INTRAVENOUS; SUBCUTANEOUS ONCE
Status: COMPLETED | OUTPATIENT
Start: 2024-01-01 | End: 2024-01-01

## 2024-01-01 RX ORDER — ROCURONIUM BROMIDE 10 MG/ML
INJECTION, SOLUTION INTRAVENOUS
Status: DISCONTINUED
Start: 2024-01-01 | End: 2024-01-01 | Stop reason: WASHOUT

## 2024-01-01 RX ORDER — HEPARIN SODIUM 1000 [USP'U]/ML
40 INJECTION, SOLUTION INTRAVENOUS; SUBCUTANEOUS PRN
Status: DISCONTINUED | OUTPATIENT
Start: 2024-01-01 | End: 2024-01-01 | Stop reason: HOSPADM

## 2024-01-01 RX ORDER — BUPIVACAINE HYDROCHLORIDE 5 MG/ML
INJECTION, SOLUTION EPIDURAL; INTRACAUDAL PRN
Status: DISCONTINUED | OUTPATIENT
Start: 2024-01-01 | End: 2024-01-01 | Stop reason: ALTCHOICE

## 2024-01-01 RX ORDER — SODIUM CHLORIDE 9 MG/ML
INJECTION, SOLUTION INTRAVENOUS PRN
Status: DISCONTINUED | OUTPATIENT
Start: 2024-01-01 | End: 2024-01-01 | Stop reason: HOSPADM

## 2024-01-01 RX ORDER — SODIUM CHLORIDE 9 MG/ML
INJECTION, SOLUTION INTRAVENOUS PRN
Status: DISCONTINUED | OUTPATIENT
Start: 2024-01-01 | End: 2024-01-01

## 2024-01-01 RX ORDER — DEXTROSE MONOHYDRATE 25 G/50ML
25 INJECTION, SOLUTION INTRAVENOUS ONCE
Status: COMPLETED | OUTPATIENT
Start: 2024-01-01 | End: 2024-01-01

## 2024-01-01 RX ORDER — DEXTROSE MONOHYDRATE 100 MG/ML
INJECTION, SOLUTION INTRAVENOUS CONTINUOUS PRN
Status: DISCONTINUED | OUTPATIENT
Start: 2024-01-01 | End: 2024-01-01 | Stop reason: HOSPADM

## 2024-01-01 RX ORDER — 0.9 % SODIUM CHLORIDE 0.9 %
250 INTRAVENOUS SOLUTION INTRAVENOUS PRN
Status: DISCONTINUED | OUTPATIENT
Start: 2024-01-01 | End: 2024-01-01 | Stop reason: HOSPADM

## 2024-01-01 RX ORDER — OXYCODONE HYDROCHLORIDE 5 MG/1
5 TABLET ORAL NIGHTLY
Status: DISCONTINUED | OUTPATIENT
Start: 2024-01-01 | End: 2024-01-01

## 2024-01-01 RX ORDER — LORAZEPAM 2 MG/ML
0.5 INJECTION INTRAMUSCULAR ONCE
Status: COMPLETED | OUTPATIENT
Start: 2024-01-01 | End: 2024-01-01

## 2024-01-01 RX ORDER — GLUCAGON 1 MG/ML
1 KIT INJECTION PRN
Status: DISCONTINUED | OUTPATIENT
Start: 2024-01-01 | End: 2024-01-01 | Stop reason: HOSPADM

## 2024-01-01 RX ORDER — SODIUM CHLORIDE 9 MG/ML
INJECTION, SOLUTION INTRAVENOUS CONTINUOUS
Status: DISCONTINUED | OUTPATIENT
Start: 2024-01-01 | End: 2024-01-01

## 2024-01-01 RX ORDER — CALCIUM GLUCONATE 20 MG/ML
2000 INJECTION, SOLUTION INTRAVENOUS ONCE
Status: COMPLETED | OUTPATIENT
Start: 2024-01-01 | End: 2024-01-01

## 2024-01-01 RX ORDER — POLYETHYLENE GLYCOL 3350 17 G/17G
17 POWDER, FOR SOLUTION ORAL DAILY PRN
Status: DISCONTINUED | OUTPATIENT
Start: 2024-01-01 | End: 2024-01-01 | Stop reason: HOSPADM

## 2024-01-01 RX ORDER — HEPARIN SODIUM 10000 [USP'U]/100ML
5-30 INJECTION, SOLUTION INTRAVENOUS CONTINUOUS
Status: DISCONTINUED | OUTPATIENT
Start: 2024-01-01 | End: 2024-01-01

## 2024-01-01 RX ORDER — DEXTROSE MONOHYDRATE 25 G/50ML
50 INJECTION, SOLUTION INTRAVENOUS ONCE
Status: COMPLETED | OUTPATIENT
Start: 2024-01-01 | End: 2024-01-01

## 2024-01-01 RX ORDER — 0.9 % SODIUM CHLORIDE 0.9 %
150 INTRAVENOUS SOLUTION INTRAVENOUS PRN
Status: DISCONTINUED | OUTPATIENT
Start: 2024-01-01 | End: 2024-01-01 | Stop reason: HOSPADM

## 2024-01-01 RX ORDER — MORPHINE SULFATE 2 MG/ML
2 INJECTION, SOLUTION INTRAMUSCULAR; INTRAVENOUS ONCE
Status: COMPLETED | OUTPATIENT
Start: 2024-01-01 | End: 2024-01-01

## 2024-01-01 RX ORDER — VASOPRESSIN IN DEXTROSE 5 % 20/100 ML
0.04 PLASTIC BAG, INJECTION (ML) INTRAVENOUS CONTINUOUS
Status: DISCONTINUED | OUTPATIENT
Start: 2024-01-01 | End: 2024-01-01 | Stop reason: HOSPADM

## 2024-01-01 RX ORDER — LEVOTHYROXINE SODIUM 75 UG/1
75 TABLET ORAL DAILY
Status: DISCONTINUED | OUTPATIENT
Start: 2024-01-01 | End: 2024-01-01 | Stop reason: HOSPADM

## 2024-01-01 RX ORDER — SULFAMETHOXAZOLE AND TRIMETHOPRIM 800; 160 MG/1; MG/1
1 TABLET ORAL 2 TIMES DAILY
Qty: 20 TABLET | Refills: 0 | Status: ON HOLD | OUTPATIENT
Start: 2024-01-01 | End: 2024-01-01

## 2024-01-01 RX ORDER — ETOMIDATE 2 MG/ML
INJECTION INTRAVENOUS
Status: COMPLETED
Start: 2024-01-01 | End: 2024-01-01

## 2024-01-01 RX ORDER — ALBUMIN (HUMAN) 12.5 G/50ML
12.5 SOLUTION INTRAVENOUS PRN
Status: DISCONTINUED | OUTPATIENT
Start: 2024-01-01 | End: 2024-01-01 | Stop reason: HOSPADM

## 2024-01-01 RX ORDER — CALCIUM GLUCONATE 20 MG/ML
1000 INJECTION, SOLUTION INTRAVENOUS ONCE
Status: COMPLETED | OUTPATIENT
Start: 2024-01-01 | End: 2024-01-01

## 2024-01-01 RX ORDER — ASPIRIN 81 MG/1
81 TABLET, CHEWABLE ORAL DAILY
Status: DISCONTINUED | OUTPATIENT
Start: 2024-01-01 | End: 2024-01-01 | Stop reason: HOSPADM

## 2024-01-01 RX ORDER — NOREPINEPHRINE BITARTRATE 0.06 MG/ML
1-100 INJECTION, SOLUTION INTRAVENOUS CONTINUOUS
Status: DISCONTINUED | OUTPATIENT
Start: 2024-01-01 | End: 2024-01-01 | Stop reason: HOSPADM

## 2024-01-01 RX ORDER — SODIUM CHLORIDE 0.9 % (FLUSH) 0.9 %
5-40 SYRINGE (ML) INJECTION EVERY 12 HOURS SCHEDULED
Status: DISCONTINUED | OUTPATIENT
Start: 2024-01-01 | End: 2024-01-01 | Stop reason: HOSPADM

## 2024-01-01 RX ORDER — ALBUTEROL SULFATE 0.83 MG/ML
2.5 SOLUTION RESPIRATORY (INHALATION) EVERY 4 HOURS PRN
Status: DISCONTINUED | OUTPATIENT
Start: 2024-01-01 | End: 2024-01-01 | Stop reason: HOSPADM

## 2024-01-01 RX ORDER — MIDODRINE HYDROCHLORIDE 5 MG/1
10 TABLET ORAL PRN
Status: DISPENSED | OUTPATIENT
Start: 2024-01-01 | End: 2024-01-01

## 2024-01-01 RX ORDER — FENTANYL CITRATE 50 UG/ML
50 INJECTION, SOLUTION INTRAMUSCULAR; INTRAVENOUS
Status: DISCONTINUED | OUTPATIENT
Start: 2024-01-01 | End: 2024-01-01 | Stop reason: HOSPADM

## 2024-01-01 RX ORDER — PHENYLEPHRINE HCL IN 0.9% NACL 50MG/250ML
10-300 PLASTIC BAG, INJECTION (ML) INTRAVENOUS CONTINUOUS
Status: DISCONTINUED | OUTPATIENT
Start: 2024-01-01 | End: 2024-01-01 | Stop reason: HOSPADM

## 2024-01-01 RX ORDER — LIDOCAINE 4 G/G
1 PATCH TOPICAL DAILY
Status: DISCONTINUED | OUTPATIENT
Start: 2024-01-01 | End: 2024-01-01 | Stop reason: HOSPADM

## 2024-01-01 RX ORDER — ALBUTEROL SULFATE 0.83 MG/ML
2.5 SOLUTION RESPIRATORY (INHALATION)
Status: COMPLETED | OUTPATIENT
Start: 2024-01-01 | End: 2024-01-01

## 2024-01-01 RX ORDER — DOCUSATE SODIUM 100 MG/1
100 CAPSULE, LIQUID FILLED ORAL DAILY
Status: DISCONTINUED | OUTPATIENT
Start: 2024-01-01 | End: 2024-01-01 | Stop reason: HOSPADM

## 2024-01-01 RX ORDER — FLUDROCORTISONE ACETATE 0.1 MG/1
0.2 TABLET ORAL ONCE
Status: COMPLETED | OUTPATIENT
Start: 2024-01-01 | End: 2024-01-01

## 2024-01-01 RX ORDER — ALBUMIN (HUMAN) 12.5 G/50ML
25 SOLUTION INTRAVENOUS ONCE
Status: COMPLETED | OUTPATIENT
Start: 2024-01-01 | End: 2024-01-01

## 2024-01-01 RX ORDER — ALBUMIN (HUMAN) 12.5 G/50ML
25 SOLUTION INTRAVENOUS PRN
Status: COMPLETED | OUTPATIENT
Start: 2024-01-01 | End: 2024-01-01

## 2024-01-01 RX ORDER — MIDODRINE HYDROCHLORIDE 5 MG/1
5 TABLET ORAL ONCE
Status: COMPLETED | OUTPATIENT
Start: 2024-01-01 | End: 2024-01-01

## 2024-01-01 RX ORDER — FAMOTIDINE 20 MG/1
20 TABLET, FILM COATED ORAL DAILY
Status: DISCONTINUED | OUTPATIENT
Start: 2024-01-01 | End: 2024-01-01 | Stop reason: HOSPADM

## 2024-01-01 RX ORDER — HEPARIN SODIUM 10000 [USP'U]/100ML
5-30 INJECTION, SOLUTION INTRAVENOUS CONTINUOUS
Status: DISCONTINUED | OUTPATIENT
Start: 2024-01-01 | End: 2024-01-01 | Stop reason: HOSPADM

## 2024-01-01 RX ADMIN — DEXTROSE MONOHYDRATE 125 ML: 100 INJECTION, SOLUTION INTRAVENOUS at 10:40

## 2024-01-01 RX ADMIN — ONDANSETRON 4 MG: 2 INJECTION INTRAMUSCULAR; INTRAVENOUS at 01:07

## 2024-01-01 RX ADMIN — FAMOTIDINE 20 MG: 20 TABLET, FILM COATED ORAL at 08:41

## 2024-01-01 RX ADMIN — HYDROCORTISONE SODIUM SUCCINATE 100 MG: 100 INJECTION, POWDER, FOR SOLUTION INTRAMUSCULAR; INTRAVENOUS at 10:49

## 2024-01-01 RX ADMIN — DEXMEDETOMIDINE HYDROCHLORIDE 1 MCG/KG/HR: 4 INJECTION, SOLUTION INTRAVENOUS at 09:46

## 2024-01-01 RX ADMIN — DEXMEDETOMIDINE HYDROCHLORIDE 1.4 MCG/KG/HR: 4 INJECTION, SOLUTION INTRAVENOUS at 14:59

## 2024-01-01 RX ADMIN — OXYCODONE 5 MG: 5 TABLET ORAL at 08:44

## 2024-01-01 RX ADMIN — OXYCODONE 5 MG: 5 TABLET ORAL at 17:02

## 2024-01-01 RX ADMIN — VANCOMYCIN HYDROCHLORIDE 1000 MG: 1 INJECTION, POWDER, LYOPHILIZED, FOR SOLUTION INTRAVENOUS at 04:20

## 2024-01-01 RX ADMIN — IPRATROPIUM BROMIDE AND ALBUTEROL SULFATE 1 DOSE: 2.5; .5 SOLUTION RESPIRATORY (INHALATION) at 21:13

## 2024-01-01 RX ADMIN — HEPARIN SODIUM 24 UNITS/KG/HR: 10000 INJECTION, SOLUTION INTRAVENOUS at 00:23

## 2024-01-01 RX ADMIN — HEPARIN SODIUM 22 UNITS/KG/HR: 10000 INJECTION, SOLUTION INTRAVENOUS at 15:36

## 2024-01-01 RX ADMIN — Medication 80 MCG/MIN: at 09:44

## 2024-01-01 RX ADMIN — CEFEPIME 2000 MG: 2 INJECTION, POWDER, FOR SOLUTION INTRAVENOUS at 08:12

## 2024-01-01 RX ADMIN — FENTANYL CITRATE 50 MCG: 50 INJECTION, SOLUTION INTRAMUSCULAR; INTRAVENOUS at 23:02

## 2024-01-01 RX ADMIN — FENTANYL CITRATE 50 MCG: 50 INJECTION, SOLUTION INTRAMUSCULAR; INTRAVENOUS at 09:41

## 2024-01-01 RX ADMIN — ALBUMIN (HUMAN) 25 G: 0.25 INJECTION, SOLUTION INTRAVENOUS at 09:32

## 2024-01-01 RX ADMIN — FENTANYL CITRATE 50 MCG: 50 INJECTION, SOLUTION INTRAMUSCULAR; INTRAVENOUS at 10:41

## 2024-01-01 RX ADMIN — IOPAMIDOL 75 ML: 755 INJECTION, SOLUTION INTRAVENOUS at 02:50

## 2024-01-01 RX ADMIN — Medication 34 MCG/MIN: at 23:09

## 2024-01-01 RX ADMIN — CEFAZOLIN 1000 MG: 1 INJECTION, POWDER, FOR SOLUTION INTRAMUSCULAR; INTRAVENOUS at 11:12

## 2024-01-01 RX ADMIN — SODIUM BICARBONATE: 84 INJECTION, SOLUTION INTRAVENOUS at 12:27

## 2024-01-01 RX ADMIN — OXYCODONE 5 MG: 5 TABLET ORAL at 20:00

## 2024-01-01 RX ADMIN — HEPARIN SODIUM 3000 UNITS: 1000 INJECTION INTRAVENOUS; SUBCUTANEOUS at 15:36

## 2024-01-01 RX ADMIN — DEXTROSE MONOHYDRATE 125 ML: 100 INJECTION, SOLUTION INTRAVENOUS at 11:12

## 2024-01-01 RX ADMIN — HEPARIN SODIUM 3000 UNITS: 1000 INJECTION INTRAVENOUS; SUBCUTANEOUS at 02:01

## 2024-01-01 RX ADMIN — MIDODRINE HYDROCHLORIDE 10 MG: 5 TABLET ORAL at 09:32

## 2024-01-01 RX ADMIN — FENTANYL CITRATE 50 MCG: 50 INJECTION, SOLUTION INTRAMUSCULAR; INTRAVENOUS at 19:59

## 2024-01-01 RX ADMIN — LEVOTHYROXINE SODIUM 75 MCG: 75 TABLET ORAL at 08:07

## 2024-01-01 RX ADMIN — HEPARIN SODIUM 3000 UNITS: 1000 INJECTION INTRAVENOUS; SUBCUTANEOUS at 07:27

## 2024-01-01 RX ADMIN — DEXTROSE MONOHYDRATE 250 ML: 100 INJECTION, SOLUTION INTRAVENOUS at 05:31

## 2024-01-01 RX ADMIN — FENTANYL CITRATE 50 MCG: 50 INJECTION, SOLUTION INTRAMUSCULAR; INTRAVENOUS at 09:25

## 2024-01-01 RX ADMIN — HEPARIN SODIUM 16 UNITS/KG/HR: 10000 INJECTION, SOLUTION INTRAVENOUS at 03:18

## 2024-01-01 RX ADMIN — HEPARIN SODIUM 6000 UNITS: 1000 INJECTION INTRAVENOUS; SUBCUTANEOUS at 03:54

## 2024-01-01 RX ADMIN — SODIUM CHLORIDE, PRESERVATIVE FREE 10 ML: 5 INJECTION INTRAVENOUS at 20:30

## 2024-01-01 RX ADMIN — Medication 10 MCG/MIN: at 06:23

## 2024-01-01 RX ADMIN — Medication 100 MCG/MIN: at 03:07

## 2024-01-01 RX ADMIN — Medication 100 MCG/MIN: at 00:32

## 2024-01-01 RX ADMIN — VASOPRESSIN 0.03 UNITS/MIN: 0.2 INJECTION INTRAVENOUS at 04:13

## 2024-01-01 RX ADMIN — NOREPINEPHRINE BITARTRATE 10 MCG/MIN: 0.06 INJECTION, SOLUTION INTRAVENOUS at 03:50

## 2024-01-01 RX ADMIN — DEXMEDETOMIDINE HYDROCHLORIDE 0.5 MCG/KG/HR: 4 INJECTION, SOLUTION INTRAVENOUS at 20:31

## 2024-01-01 RX ADMIN — SODIUM BICARBONATE 25 MEQ: 84 INJECTION, SOLUTION INTRAVENOUS at 05:44

## 2024-01-01 RX ADMIN — Medication 10 MG: at 20:44

## 2024-01-01 RX ADMIN — EPINEPHRINE 1 MCG/MIN: 1 INJECTION INTRAMUSCULAR; INTRAVENOUS; SUBCUTANEOUS at 06:53

## 2024-01-01 RX ADMIN — MIDODRINE HYDROCHLORIDE 10 MG: 5 TABLET ORAL at 11:12

## 2024-01-01 RX ADMIN — Medication 100 MCG/MIN: at 05:42

## 2024-01-01 RX ADMIN — HEPARIN SODIUM 6000 UNITS: 1000 INJECTION INTRAVENOUS; SUBCUTANEOUS at 04:46

## 2024-01-01 RX ADMIN — IPRATROPIUM BROMIDE AND ALBUTEROL SULFATE 1 DOSE: 2.5; .5 SOLUTION RESPIRATORY (INHALATION) at 08:02

## 2024-01-01 RX ADMIN — DOCUSATE SODIUM 100 MG: 100 CAPSULE, LIQUID FILLED ORAL at 08:41

## 2024-01-01 RX ADMIN — Medication 50 MEQ: at 09:43

## 2024-01-01 RX ADMIN — HEPARIN SODIUM 2300 UNITS: 1000 INJECTION INTRAVENOUS; SUBCUTANEOUS at 07:01

## 2024-01-01 RX ADMIN — MIDODRINE HYDROCHLORIDE 10 MG: 5 TABLET ORAL at 08:41

## 2024-01-01 RX ADMIN — SODIUM CHLORIDE 2 G: 1 TABLET ORAL at 23:16

## 2024-01-01 RX ADMIN — HEPARIN SODIUM 22 UNITS/KG/HR: 10000 INJECTION, SOLUTION INTRAVENOUS at 05:29

## 2024-01-01 RX ADMIN — Medication 100 MCG/MIN: at 19:14

## 2024-01-01 RX ADMIN — IPRATROPIUM BROMIDE AND ALBUTEROL SULFATE 1 DOSE: 2.5; .5 SOLUTION RESPIRATORY (INHALATION) at 08:44

## 2024-01-01 RX ADMIN — VANCOMYCIN HYDROCHLORIDE 1000 MG: 1 INJECTION, POWDER, LYOPHILIZED, FOR SOLUTION INTRAVENOUS at 14:46

## 2024-01-01 RX ADMIN — ALBUMIN (HUMAN) 12.5 G: 0.25 INJECTION, SOLUTION INTRAVENOUS at 10:27

## 2024-01-01 RX ADMIN — Medication 1500 ML/HR: at 22:01

## 2024-01-01 RX ADMIN — Medication 10 MG: at 04:26

## 2024-01-01 RX ADMIN — SODIUM CHLORIDE 1500 MG: 9 INJECTION, SOLUTION INTRAVENOUS at 14:56

## 2024-01-01 RX ADMIN — Medication 45 MCG/MIN: at 13:25

## 2024-01-01 RX ADMIN — HEPARIN SODIUM 1600 UNITS: 1000 INJECTION INTRAVENOUS; SUBCUTANEOUS at 12:48

## 2024-01-01 RX ADMIN — INSULIN HUMAN 10 UNITS: 100 INJECTION, SOLUTION PARENTERAL at 10:25

## 2024-01-01 RX ADMIN — IPRATROPIUM BROMIDE AND ALBUTEROL SULFATE 1 DOSE: 2.5; .5 SOLUTION RESPIRATORY (INHALATION) at 20:29

## 2024-01-01 RX ADMIN — LEVOTHYROXINE SODIUM 75 MCG: 75 TABLET ORAL at 08:41

## 2024-01-01 RX ADMIN — DEXTROSE MONOHYDRATE 125 ML: 100 INJECTION, SOLUTION INTRAVENOUS at 04:04

## 2024-01-01 RX ADMIN — Medication 100 MCG/MIN: at 21:57

## 2024-01-01 RX ADMIN — OXYCODONE 5 MG: 5 TABLET ORAL at 15:35

## 2024-01-01 RX ADMIN — Medication 1500 ML/HR: at 12:22

## 2024-01-01 RX ADMIN — ASPIRIN 81 MG 81 MG: 81 TABLET ORAL at 08:41

## 2024-01-01 RX ADMIN — INSULIN HUMAN 10 UNITS: 100 INJECTION, SOLUTION PARENTERAL at 03:04

## 2024-01-01 RX ADMIN — MIDODRINE HYDROCHLORIDE 5 MG: 5 TABLET ORAL at 16:23

## 2024-01-01 RX ADMIN — ALBUMIN (HUMAN) 25 G: 0.25 INJECTION, SOLUTION INTRAVENOUS at 11:13

## 2024-01-01 RX ADMIN — Medication 40 MCG/MIN: at 11:31

## 2024-01-01 RX ADMIN — OXYCODONE 2.5 MG: 5 TABLET ORAL at 07:57

## 2024-01-01 RX ADMIN — ATORVASTATIN CALCIUM 40 MG: 40 TABLET, FILM COATED ORAL at 20:54

## 2024-01-01 RX ADMIN — FAMOTIDINE 20 MG: 20 TABLET, FILM COATED ORAL at 08:07

## 2024-01-01 RX ADMIN — SODIUM BICARBONATE: 84 INJECTION, SOLUTION INTRAVENOUS at 07:55

## 2024-01-01 RX ADMIN — Medication 300 MCG/MIN: at 02:05

## 2024-01-01 RX ADMIN — HEPARIN SODIUM 1900 UNITS: 1000 INJECTION INTRAVENOUS; SUBCUTANEOUS at 13:49

## 2024-01-01 RX ADMIN — SODIUM ZIRCONIUM CYCLOSILICATE 10 G: 5 POWDER, FOR SUSPENSION ORAL at 06:22

## 2024-01-01 RX ADMIN — FENTANYL CITRATE 50 MCG: 50 INJECTION, SOLUTION INTRAMUSCULAR; INTRAVENOUS at 14:20

## 2024-01-01 RX ADMIN — ALBUMIN (HUMAN) 25 G: 0.25 INJECTION, SOLUTION INTRAVENOUS at 10:52

## 2024-01-01 RX ADMIN — HEPARIN SODIUM 1900 UNITS: 1000 INJECTION INTRAVENOUS; SUBCUTANEOUS at 13:28

## 2024-01-01 RX ADMIN — DOCUSATE SODIUM 100 MG: 100 CAPSULE, LIQUID FILLED ORAL at 07:56

## 2024-01-01 RX ADMIN — HYDROCORTISONE SODIUM SUCCINATE 100 MG: 100 INJECTION, POWDER, FOR SOLUTION INTRAMUSCULAR; INTRAVENOUS at 20:30

## 2024-01-01 RX ADMIN — INSULIN HUMAN 10 UNITS: 100 INJECTION, SOLUTION PARENTERAL at 07:51

## 2024-01-01 RX ADMIN — CEFEPIME 1000 MG: 1 INJECTION, POWDER, FOR SOLUTION INTRAMUSCULAR; INTRAVENOUS at 15:45

## 2024-01-01 RX ADMIN — MIDODRINE HYDROCHLORIDE 5 MG: 5 TABLET ORAL at 07:56

## 2024-01-01 RX ADMIN — HEPARIN SODIUM 17 UNITS/KG/HR: 10000 INJECTION, SOLUTION INTRAVENOUS at 02:03

## 2024-01-01 RX ADMIN — SODIUM CHLORIDE, PRESERVATIVE FREE 10 ML: 5 INJECTION INTRAVENOUS at 20:55

## 2024-01-01 RX ADMIN — VASOPRESSIN 0.03 UNITS/MIN: 0.2 INJECTION INTRAVENOUS at 14:46

## 2024-01-01 RX ADMIN — IPRATROPIUM BROMIDE AND ALBUTEROL SULFATE 1 DOSE: 2.5; .5 SOLUTION RESPIRATORY (INHALATION) at 02:58

## 2024-01-01 RX ADMIN — SODIUM CHLORIDE, PRESERVATIVE FREE 10 ML: 5 INJECTION INTRAVENOUS at 21:21

## 2024-01-01 RX ADMIN — MIDODRINE HYDROCHLORIDE 5 MG: 5 TABLET ORAL at 12:14

## 2024-01-01 RX ADMIN — ALBUTEROL SULFATE 2.5 MG: 2.5 SOLUTION RESPIRATORY (INHALATION) at 02:25

## 2024-01-01 RX ADMIN — IPRATROPIUM BROMIDE AND ALBUTEROL SULFATE 1 DOSE: 2.5; .5 SOLUTION RESPIRATORY (INHALATION) at 20:36

## 2024-01-01 RX ADMIN — FENTANYL CITRATE 50 MCG: 50 INJECTION, SOLUTION INTRAMUSCULAR; INTRAVENOUS at 08:45

## 2024-01-01 RX ADMIN — HEPARIN SODIUM 16 UNITS/KG/HR: 10000 INJECTION, SOLUTION INTRAVENOUS at 04:29

## 2024-01-01 RX ADMIN — LORAZEPAM 0.5 MG: 2 INJECTION INTRAMUSCULAR; INTRAVENOUS at 09:13

## 2024-01-01 RX ADMIN — CALCIUM GLUCONATE 1000 MG: 20 INJECTION, SOLUTION INTRAVENOUS at 04:46

## 2024-01-01 RX ADMIN — FAMOTIDINE 20 MG: 20 TABLET, FILM COATED ORAL at 07:56

## 2024-01-01 RX ADMIN — SODIUM CHLORIDE, PRESERVATIVE FREE 10 ML: 5 INJECTION INTRAVENOUS at 19:34

## 2024-01-01 RX ADMIN — MIDODRINE HYDROCHLORIDE 10 MG: 5 TABLET ORAL at 03:16

## 2024-01-01 RX ADMIN — LEVOTHYROXINE SODIUM 75 MCG: 75 TABLET ORAL at 13:15

## 2024-01-01 RX ADMIN — SODIUM BICARBONATE: 84 INJECTION, SOLUTION INTRAVENOUS at 23:26

## 2024-01-01 RX ADMIN — Medication 100 MCG/MIN: at 16:26

## 2024-01-01 RX ADMIN — SODIUM BICARBONATE 50 MEQ: 84 INJECTION, SOLUTION INTRAVENOUS at 13:32

## 2024-01-01 RX ADMIN — OXYCODONE 5 MG: 5 TABLET ORAL at 20:44

## 2024-01-01 RX ADMIN — CEFEPIME 2000 MG: 2 INJECTION, POWDER, FOR SOLUTION INTRAVENOUS at 08:21

## 2024-01-01 RX ADMIN — IPRATROPIUM BROMIDE AND ALBUTEROL SULFATE 1 DOSE: 2.5; .5 SOLUTION RESPIRATORY (INHALATION) at 16:30

## 2024-01-01 RX ADMIN — SODIUM CHLORIDE, PRESERVATIVE FREE 10 ML: 5 INJECTION INTRAVENOUS at 08:12

## 2024-01-01 RX ADMIN — DEXTROSE MONOHYDRATE 250 ML: 100 INJECTION, SOLUTION INTRAVENOUS at 03:04

## 2024-01-01 RX ADMIN — SODIUM CHLORIDE: 9 INJECTION, SOLUTION INTRAVENOUS at 21:56

## 2024-01-01 RX ADMIN — Medication 200 MCG/MIN: at 17:17

## 2024-01-01 RX ADMIN — DEXTROSE MONOHYDRATE 250 ML: 100 INJECTION, SOLUTION INTRAVENOUS at 18:26

## 2024-01-01 RX ADMIN — VASOPRESSIN 0.03 UNITS/MIN: 0.2 INJECTION INTRAVENOUS at 13:04

## 2024-01-01 RX ADMIN — MIDODRINE HYDROCHLORIDE 5 MG: 5 TABLET ORAL at 21:23

## 2024-01-01 RX ADMIN — Medication 300 MCG/MIN: at 05:11

## 2024-01-01 RX ADMIN — HEPARIN SODIUM 1600 UNITS: 1000 INJECTION INTRAVENOUS; SUBCUTANEOUS at 13:27

## 2024-01-01 RX ADMIN — Medication 45 MCG/MIN: at 04:20

## 2024-01-01 RX ADMIN — LEVOTHYROXINE SODIUM 75 MCG: 75 TABLET ORAL at 08:06

## 2024-01-01 RX ADMIN — INSULIN HUMAN 10 UNITS: 100 INJECTION, SOLUTION PARENTERAL at 06:47

## 2024-01-01 RX ADMIN — DEXTROSE MONOHYDRATE 50 G: 25 INJECTION, SOLUTION INTRAVENOUS at 07:51

## 2024-01-01 RX ADMIN — FENTANYL CITRATE 50 MCG: 50 INJECTION, SOLUTION INTRAMUSCULAR; INTRAVENOUS at 12:22

## 2024-01-01 RX ADMIN — HEPARIN SODIUM 1600 UNITS: 1000 INJECTION INTRAVENOUS; SUBCUTANEOUS at 13:50

## 2024-01-01 RX ADMIN — VASOPRESSIN 0.03 UNITS/MIN: 0.2 INJECTION INTRAVENOUS at 20:42

## 2024-01-01 RX ADMIN — ALBUMIN (HUMAN) 12.5 G: 0.25 INJECTION, SOLUTION INTRAVENOUS at 11:41

## 2024-01-01 RX ADMIN — IPRATROPIUM BROMIDE AND ALBUTEROL SULFATE 1 DOSE: 2.5; .5 SOLUTION RESPIRATORY (INHALATION) at 20:38

## 2024-01-01 RX ADMIN — HEPARIN SODIUM 18 UNITS/KG/HR: 10000 INJECTION, SOLUTION INTRAVENOUS at 04:04

## 2024-01-01 RX ADMIN — HEPARIN SODIUM 1900 UNITS: 1000 INJECTION INTRAVENOUS; SUBCUTANEOUS at 12:49

## 2024-01-01 RX ADMIN — FENTANYL CITRATE 50 MCG: 50 INJECTION, SOLUTION INTRAMUSCULAR; INTRAVENOUS at 00:09

## 2024-01-01 RX ADMIN — IOPAMIDOL 75 ML: 755 INJECTION, SOLUTION INTRAVENOUS at 04:29

## 2024-01-01 RX ADMIN — Medication 30 MCG/MIN: at 08:18

## 2024-01-01 RX ADMIN — IPRATROPIUM BROMIDE AND ALBUTEROL SULFATE 1 DOSE: 2.5; .5 SOLUTION RESPIRATORY (INHALATION) at 09:08

## 2024-01-01 RX ADMIN — MIDODRINE HYDROCHLORIDE 5 MG: 5 TABLET ORAL at 21:21

## 2024-01-01 RX ADMIN — HEPARIN SODIUM 6000 UNITS: 1000 INJECTION INTRAVENOUS; SUBCUTANEOUS at 22:02

## 2024-01-01 RX ADMIN — HYDROCORTISONE SODIUM SUCCINATE 100 MG: 100 INJECTION, POWDER, FOR SOLUTION INTRAMUSCULAR; INTRAVENOUS at 20:54

## 2024-01-01 RX ADMIN — LEVOTHYROXINE SODIUM 75 MCG: 75 TABLET ORAL at 07:57

## 2024-01-01 RX ADMIN — Medication 1500 ML/HR: at 22:03

## 2024-01-01 RX ADMIN — IPRATROPIUM BROMIDE AND ALBUTEROL SULFATE 1 DOSE: 2.5; .5 SOLUTION RESPIRATORY (INHALATION) at 20:24

## 2024-01-01 RX ADMIN — HEPARIN SODIUM 3000 UNITS: 1000 INJECTION INTRAVENOUS; SUBCUTANEOUS at 15:02

## 2024-01-01 RX ADMIN — FENTANYL CITRATE 50 MCG: 50 INJECTION, SOLUTION INTRAMUSCULAR; INTRAVENOUS at 05:10

## 2024-01-01 RX ADMIN — INSULIN HUMAN 10 UNITS: 100 INJECTION, SOLUTION PARENTERAL at 05:31

## 2024-01-01 RX ADMIN — SODIUM CHLORIDE, PRESERVATIVE FREE 10 ML: 5 INJECTION INTRAVENOUS at 08:01

## 2024-01-01 RX ADMIN — FAMOTIDINE 20 MG: 20 TABLET, FILM COATED ORAL at 13:15

## 2024-01-01 RX ADMIN — DOCUSATE SODIUM 100 MG: 100 CAPSULE, LIQUID FILLED ORAL at 13:15

## 2024-01-01 RX ADMIN — DEXTROSE MONOHYDRATE 25 G: 25 INJECTION, SOLUTION INTRAVENOUS at 10:36

## 2024-01-01 RX ADMIN — OXYCODONE 5 MG: 5 TABLET ORAL at 05:38

## 2024-01-01 RX ADMIN — FAMOTIDINE 20 MG: 20 TABLET, FILM COATED ORAL at 08:06

## 2024-01-01 RX ADMIN — ONDANSETRON 4 MG: 2 INJECTION INTRAMUSCULAR; INTRAVENOUS at 23:03

## 2024-01-01 RX ADMIN — ALBUMIN (HUMAN) 12.5 G: 0.25 INJECTION, SOLUTION INTRAVENOUS at 11:05

## 2024-01-01 RX ADMIN — SODIUM CHLORIDE: 9 INJECTION, SOLUTION INTRAVENOUS at 19:25

## 2024-01-01 RX ADMIN — SODIUM CHLORIDE, PRESERVATIVE FREE 10 ML: 5 INJECTION INTRAVENOUS at 21:56

## 2024-01-01 RX ADMIN — DEXMEDETOMIDINE HYDROCHLORIDE 0.2 MCG/KG/HR: 4 INJECTION, SOLUTION INTRAVENOUS at 00:09

## 2024-01-01 RX ADMIN — HEPARIN SODIUM 1900 UNITS: 1000 INJECTION INTRAVENOUS; SUBCUTANEOUS at 10:01

## 2024-01-01 RX ADMIN — SODIUM CHLORIDE 500 ML: 9 INJECTION, SOLUTION INTRAVENOUS at 23:27

## 2024-01-01 RX ADMIN — Medication 75 MCG/MIN: at 13:19

## 2024-01-01 RX ADMIN — HEPARIN SODIUM 2300 UNITS: 1000 INJECTION INTRAVENOUS; SUBCUTANEOUS at 13:41

## 2024-01-01 RX ADMIN — MIDODRINE HYDROCHLORIDE 10 MG: 5 TABLET ORAL at 17:32

## 2024-01-01 RX ADMIN — MEROPENEM 1000 MG: 1 INJECTION INTRAVENOUS at 14:23

## 2024-01-01 RX ADMIN — VASOPRESSIN 0.04 UNITS/MIN: 0.2 INJECTION INTRAVENOUS at 01:06

## 2024-01-01 RX ADMIN — Medication 1500 ML/HR: at 12:21

## 2024-01-01 RX ADMIN — Medication 50 MEQ: at 13:32

## 2024-01-01 RX ADMIN — Medication 1500 ML/HR: at 22:02

## 2024-01-01 RX ADMIN — DEXTROSE MONOHYDRATE 125 ML: 100 INJECTION, SOLUTION INTRAVENOUS at 17:46

## 2024-01-01 RX ADMIN — FLUDROCORTISONE ACETATE 0.2 MG: 0.1 TABLET ORAL at 06:22

## 2024-01-01 RX ADMIN — HEPARIN SODIUM 1600 UNITS: 1000 INJECTION INTRAVENOUS; SUBCUTANEOUS at 10:03

## 2024-01-01 RX ADMIN — FENTANYL CITRATE 50 MCG: 50 INJECTION, SOLUTION INTRAMUSCULAR; INTRAVENOUS at 21:11

## 2024-01-01 RX ADMIN — IPRATROPIUM BROMIDE AND ALBUTEROL SULFATE 1 DOSE: 2.5; .5 SOLUTION RESPIRATORY (INHALATION) at 02:20

## 2024-01-01 RX ADMIN — SODIUM CHLORIDE, PRESERVATIVE FREE 10 ML: 5 INJECTION INTRAVENOUS at 08:07

## 2024-01-01 RX ADMIN — Medication 275 MCG/MIN: at 20:42

## 2024-01-01 RX ADMIN — DEXMEDETOMIDINE HYDROCHLORIDE 0.2 MCG/KG/HR: 4 INJECTION, SOLUTION INTRAVENOUS at 10:12

## 2024-01-01 RX ADMIN — SODIUM CHLORIDE: 9 INJECTION, SOLUTION INTRAVENOUS at 14:19

## 2024-01-01 RX ADMIN — MEROPENEM 1000 MG: 1 INJECTION INTRAVENOUS at 11:34

## 2024-01-01 RX ADMIN — OXYCODONE 2.5 MG: 5 TABLET ORAL at 21:39

## 2024-01-01 RX ADMIN — MORPHINE SULFATE 2 MG: 2 INJECTION, SOLUTION INTRAMUSCULAR; INTRAVENOUS at 01:10

## 2024-01-01 RX ADMIN — SODIUM CHLORIDE 2 G: 1 TABLET ORAL at 14:08

## 2024-01-01 RX ADMIN — DEXTROSE MONOHYDRATE 25 G: 25 INJECTION, SOLUTION INTRAVENOUS at 06:48

## 2024-01-01 RX ADMIN — SODIUM CHLORIDE, PRESERVATIVE FREE 10 ML: 5 INJECTION INTRAVENOUS at 19:21

## 2024-01-01 RX ADMIN — SODIUM BICARBONATE: 84 INJECTION, SOLUTION INTRAVENOUS at 06:51

## 2024-01-01 RX ADMIN — HEPARIN SODIUM 18 UNITS/KG/HR: 10000 INJECTION, SOLUTION INTRAVENOUS at 03:54

## 2024-01-01 RX ADMIN — MIDODRINE HYDROCHLORIDE 10 MG: 5 TABLET ORAL at 10:25

## 2024-01-01 RX ADMIN — PIPERACILLIN AND TAZOBACTAM 4500 MG: 4; .5 INJECTION, POWDER, FOR SOLUTION INTRAVENOUS at 03:36

## 2024-01-01 RX ADMIN — Medication 1500 ML/HR: at 12:20

## 2024-01-01 RX ADMIN — CALCIUM GLUCONATE 1000 MG: 20 INJECTION, SOLUTION INTRAVENOUS at 03:04

## 2024-01-01 RX ADMIN — SODIUM CHLORIDE, PRESERVATIVE FREE 10 ML: 5 INJECTION INTRAVENOUS at 08:28

## 2024-01-01 RX ADMIN — ONDANSETRON 4 MG: 2 INJECTION INTRAMUSCULAR; INTRAVENOUS at 19:34

## 2024-01-01 RX ADMIN — CALCIUM GLUCONATE 2000 MG: 20 INJECTION, SOLUTION INTRAVENOUS at 12:26

## 2024-01-01 RX ADMIN — IPRATROPIUM BROMIDE AND ALBUTEROL SULFATE 1 DOSE: 2.5; .5 SOLUTION RESPIRATORY (INHALATION) at 08:50

## 2024-01-01 RX ADMIN — POLYETHYLENE GLYCOL 3350 17 G: 17 POWDER, FOR SOLUTION ORAL at 16:17

## 2024-01-01 RX ADMIN — Medication 300 MCG/MIN: at 23:38

## 2024-01-01 RX ADMIN — SODIUM CHLORIDE 500 ML: 9 INJECTION, SOLUTION INTRAVENOUS at 00:42

## 2024-01-01 RX ADMIN — HYDROCORTISONE SODIUM SUCCINATE 100 MG: 100 INJECTION, POWDER, FOR SOLUTION INTRAMUSCULAR; INTRAVENOUS at 10:18

## 2024-01-01 RX ADMIN — SODIUM CHLORIDE, PRESERVATIVE FREE 10 ML: 5 INJECTION INTRAVENOUS at 08:42

## 2024-01-01 RX ADMIN — SODIUM BICARBONATE 50 MEQ: 84 INJECTION, SOLUTION INTRAVENOUS at 09:43

## 2024-01-01 ASSESSMENT — PAIN SCALES - GENERAL
PAINLEVEL_OUTOF10: 10
PAINLEVEL_OUTOF10: 4
PAINLEVEL_OUTOF10: 0
PAINLEVEL_OUTOF10: 8
PAINLEVEL_OUTOF10: 10
PAINLEVEL_OUTOF10: 10
PAINLEVEL_OUTOF10: 0
PAINLEVEL_OUTOF10: 4
PAINLEVEL_OUTOF10: 3
PAINLEVEL_OUTOF10: 10
PAINLEVEL_OUTOF10: 0
PAINLEVEL_OUTOF10: 0
PAINLEVEL_OUTOF10: 10
PAINLEVEL_OUTOF10: 9
PAINLEVEL_OUTOF10: 10
PAINLEVEL_OUTOF10: 10
PAINLEVEL_OUTOF10: 4
PAINLEVEL_OUTOF10: 10
PAINLEVEL_OUTOF10: 3
PAINLEVEL_OUTOF10: 5
PAINLEVEL_OUTOF10: 7
PAINLEVEL_OUTOF10: 9
PAINLEVEL_OUTOF10: 10
PAINLEVEL_OUTOF10: 5
PAINLEVEL_OUTOF10: 7
PAINLEVEL_OUTOF10: 0
PAINLEVEL_OUTOF10: 5
PAINLEVEL_OUTOF10: 10
PAINLEVEL_OUTOF10: 6
PAINLEVEL_OUTOF10: 0

## 2024-01-01 ASSESSMENT — PAIN DESCRIPTION - DESCRIPTORS
DESCRIPTORS: DISCOMFORT
DESCRIPTORS: ACHING
DESCRIPTORS: ACHING;SORE;SHARP
DESCRIPTORS: ACHING
DESCRIPTORS: ACHING;SHARP
DESCRIPTORS: ACHING;SHARP
DESCRIPTORS: PRESSURE

## 2024-01-01 ASSESSMENT — PAIN DESCRIPTION - LOCATION
LOCATION: BACK
LOCATION: SHOULDER
LOCATION: BACK
LOCATION: BACK
LOCATION: ARM
LOCATION: BACK;GENERALIZED
LOCATION: BACK
LOCATION: SHOULDER
LOCATION: SHOULDER;BACK
LOCATION: NECK
LOCATION: BACK
LOCATION: BACK
LOCATION: ABDOMEN;BACK
LOCATION: SHOULDER
LOCATION: SHOULDER

## 2024-01-01 ASSESSMENT — PAIN DESCRIPTION - ORIENTATION
ORIENTATION: RIGHT
ORIENTATION: LOWER
ORIENTATION: RIGHT
ORIENTATION: RIGHT

## 2024-01-01 ASSESSMENT — ENCOUNTER SYMPTOMS
ABDOMINAL PAIN: 1
RESPIRATORY NEGATIVE: 1

## 2024-01-01 ASSESSMENT — PAIN SCALES - WONG BAKER
WONGBAKER_NUMERICALRESPONSE: HURTS A LITTLE BIT

## 2024-01-01 ASSESSMENT — PAIN - FUNCTIONAL ASSESSMENT
PAIN_FUNCTIONAL_ASSESSMENT: 0-10
PAIN_FUNCTIONAL_ASSESSMENT: NONE - DENIES PAIN

## 2024-01-01 ASSESSMENT — PAIN DESCRIPTION - ONSET: ONSET: ON-GOING

## 2024-01-01 ASSESSMENT — PAIN DESCRIPTION - FREQUENCY: FREQUENCY: INTERMITTENT

## 2024-01-01 ASSESSMENT — PAIN DESCRIPTION - DIRECTION: RADIATING_TOWARDS: NO

## 2024-01-03 ENCOUNTER — HOSPITAL ENCOUNTER (OUTPATIENT)
Facility: MEDICAL CENTER | Age: 64
End: 2024-01-03
Payer: COMMERCIAL

## 2024-01-08 ENCOUNTER — HOSPITAL ENCOUNTER (OUTPATIENT)
Dept: INFUSION THERAPY | Facility: MEDICAL CENTER | Age: 64
Discharge: HOME | End: 2024-01-08
Payer: COMMERCIAL

## 2024-01-08 ENCOUNTER — TELEPHONE (OUTPATIENT)
Dept: ONCOLOGY | Age: 64
End: 2024-01-08

## 2024-01-08 VITALS
SYSTOLIC BLOOD PRESSURE: 110 MMHG | HEART RATE: 80 BPM | RESPIRATION RATE: 16 BRPM | TEMPERATURE: 98 F | WEIGHT: 165.8 LBS | DIASTOLIC BLOOD PRESSURE: 73 MMHG | BODY MASS INDEX: 20.72 KG/M2

## 2024-01-08 DIAGNOSIS — E03.9 ACQUIRED HYPOTHYROIDISM: ICD-10-CM

## 2024-01-08 DIAGNOSIS — C34.90 MALIGNANT NEOPLASM OF LUNG, UNSPECIFIED LATERALITY, UNSPECIFIED PART OF LUNG (HCC): Primary | ICD-10-CM

## 2024-01-08 DIAGNOSIS — C77.0 METASTASIS TO CERVICAL LYMPH NODE (HCC): ICD-10-CM

## 2024-01-08 DIAGNOSIS — C77.1 METASTASIS TO MEDIASTINAL LYMPH NODE (HCC): ICD-10-CM

## 2024-01-08 LAB
ALBUMIN SERPL-MCNC: 3.9 G/DL (ref 3.5–5.2)
ALP SERPL-CCNC: 74 U/L (ref 40–129)
ALT SERPL-CCNC: 9 U/L (ref 5–41)
AMYLASE SERPL-CCNC: 82 U/L (ref 28–100)
ANION GAP SERPL CALCULATED.3IONS-SCNC: 12 MMOL/L (ref 9–17)
AST SERPL-CCNC: 25 U/L
BASOPHILS # BLD: 0.11 K/UL (ref 0–0.2)
BASOPHILS NFR BLD: 1 % (ref 0–2)
BILIRUB SERPL-MCNC: 0.4 MG/DL (ref 0.3–1.2)
BUN SERPL-MCNC: 22 MG/DL (ref 8–23)
BUN/CREAT SERPL: 22 (ref 9–20)
CALCIUM SERPL-MCNC: 9 MG/DL (ref 8.6–10.4)
CHLORIDE SERPL-SCNC: 102 MMOL/L (ref 98–107)
CO2 SERPL-SCNC: 24 MMOL/L (ref 20–31)
CORTIS SERPL-MCNC: 15.8 UG/DL (ref 2.7–18.4)
CREAT SERPL-MCNC: 1 MG/DL (ref 0.7–1.2)
EOSINOPHIL # BLD: 0.29 K/UL (ref 0–0.44)
EOSINOPHILS RELATIVE PERCENT: 4 % (ref 1–4)
ERYTHROCYTE [DISTWIDTH] IN BLOOD BY AUTOMATED COUNT: 16.9 % (ref 11.8–14.4)
GFR SERPL CREATININE-BSD FRML MDRD: >60 ML/MIN/1.73M2
GLUCOSE SERPL-MCNC: 114 MG/DL (ref 70–99)
HCT VFR BLD AUTO: 32.4 % (ref 40.7–50.3)
HGB BLD-MCNC: 10.8 G/DL (ref 13–17)
IMM GRANULOCYTES # BLD AUTO: 0.04 K/UL (ref 0–0.3)
IMM GRANULOCYTES NFR BLD: 1 %
LIPASE SERPL-CCNC: 33 U/L (ref 13–60)
LYMPHOCYTES NFR BLD: 1.62 K/UL (ref 1.1–3.7)
LYMPHOCYTES RELATIVE PERCENT: 21 % (ref 24–43)
MCH RBC QN AUTO: 32.9 PG (ref 25.2–33.5)
MCHC RBC AUTO-ENTMCNC: 33.3 G/DL (ref 28.4–34.8)
MCV RBC AUTO: 98.8 FL (ref 82.6–102.9)
MONOCYTES NFR BLD: 0.86 K/UL (ref 0.1–1.2)
MONOCYTES NFR BLD: 11 % (ref 3–12)
NEUTROPHILS NFR BLD: 62 % (ref 36–65)
NEUTS SEG NFR BLD: 4.76 K/UL (ref 1.5–8.1)
NRBC BLD-RTO: 0 PER 100 WBC
PLATELET # BLD AUTO: 217 K/UL (ref 138–453)
PMV BLD AUTO: 10.3 FL (ref 8.1–13.5)
POTASSIUM SERPL-SCNC: 4.5 MMOL/L (ref 3.7–5.3)
PROT SERPL-MCNC: 7 G/DL (ref 6.4–8.3)
RBC # BLD AUTO: 3.28 M/UL (ref 4.21–5.77)
RBC # BLD: ABNORMAL 10*6/UL
SODIUM SERPL-SCNC: 138 MMOL/L (ref 135–144)
TSH SERPL DL<=0.05 MIU/L-ACNC: 8.47 UIU/ML (ref 0.3–5)
WBC OTHER # BLD: 7.7 K/UL (ref 3.5–11.3)

## 2024-01-08 PROCEDURE — 82533 TOTAL CORTISOL: CPT

## 2024-01-08 PROCEDURE — 36591 DRAW BLOOD OFF VENOUS DEVICE: CPT

## 2024-01-08 PROCEDURE — 96413 CHEMO IV INFUSION 1 HR: CPT

## 2024-01-08 PROCEDURE — 83690 ASSAY OF LIPASE: CPT

## 2024-01-08 PROCEDURE — 80053 COMPREHEN METABOLIC PANEL: CPT

## 2024-01-08 PROCEDURE — 82150 ASSAY OF AMYLASE: CPT

## 2024-01-08 PROCEDURE — 84443 ASSAY THYROID STIM HORMONE: CPT

## 2024-01-08 PROCEDURE — 2580000003 HC RX 258: Performed by: INTERNAL MEDICINE

## 2024-01-08 PROCEDURE — 6360000002 HC RX W HCPCS: Performed by: INTERNAL MEDICINE

## 2024-01-08 PROCEDURE — 85025 COMPLETE CBC W/AUTO DIFF WBC: CPT

## 2024-01-08 RX ORDER — SODIUM CHLORIDE 9 MG/ML
5-250 INJECTION, SOLUTION INTRAVENOUS PRN
Status: DISCONTINUED | OUTPATIENT
Start: 2024-01-08 | End: 2024-01-09 | Stop reason: HOSPADM

## 2024-01-08 RX ORDER — SODIUM CHLORIDE 0.9 % (FLUSH) 0.9 %
5-40 SYRINGE (ML) INJECTION PRN
Status: DISCONTINUED | OUTPATIENT
Start: 2024-01-08 | End: 2024-01-09 | Stop reason: HOSPADM

## 2024-01-08 RX ORDER — HEPARIN 100 UNIT/ML
500 SYRINGE INTRAVENOUS PRN
Status: DISCONTINUED | OUTPATIENT
Start: 2024-01-08 | End: 2024-01-09 | Stop reason: HOSPADM

## 2024-01-08 RX ADMIN — HEPARIN 500 UNITS: 100 SYRINGE at 10:37

## 2024-01-08 RX ADMIN — SODIUM CHLORIDE 25 ML/HR: 9 INJECTION, SOLUTION INTRAVENOUS at 10:00

## 2024-01-08 RX ADMIN — SODIUM CHLORIDE, PRESERVATIVE FREE 10 ML: 5 INJECTION INTRAVENOUS at 09:12

## 2024-01-08 RX ADMIN — SODIUM CHLORIDE 200 MG: 9 INJECTION, SOLUTION INTRAVENOUS at 10:02

## 2024-01-08 RX ADMIN — SODIUM CHLORIDE, PRESERVATIVE FREE 10 ML: 5 INJECTION INTRAVENOUS at 10:37

## 2024-01-08 NOTE — TELEPHONE ENCOUNTER
Name: Jony Portillo  : 1960  MRN: [id number]    Oncology Navigation Follow-Up Note    Contact Type:  Telephone    Notes:   Navigator met with pt. Face to face and offering assistance. Pt. Denies needs and Writer will continue to follow.       Electronically signed by Erica Martin RN on 2024 at 10:38 AM

## 2024-01-08 NOTE — PROGRESS NOTES
Pt arrives ambulatory for C6D1 Keytruda  Denies new complaints;Has concerns about lymph node swelling to Rt neck & LLE mild rash & dry skin- no swelling noted. Pt follows with Vascular  No other complaints  Denies difficulty in swallowing or pain issues  VSS  Labs drawn per port & reviewed  Within parameters to proceed with tx  Keytruda infused without signs of adverse rxn  Port flushed & heparinized with intact Odell needle removed per protocol  Pt ambulatory at discharge in stable condition  Returns 1/29 for C7D1 Keytruda & MD visit  Informed pt to call triage line if any issues & he verbalized understanding

## 2024-01-09 RX ORDER — FOLIC ACID 1 MG/1
1 TABLET ORAL DAILY
Qty: 90 TABLET | Refills: 1 | Status: SHIPPED | OUTPATIENT
Start: 2024-01-09

## 2024-01-22 PROBLEM — C34.11 MALIGNANT NEOPLASM OF UPPER LOBE OF RIGHT LUNG (HCC): Status: ACTIVE | Noted: 2023-09-11

## 2024-01-24 ENCOUNTER — HOSPITAL ENCOUNTER (OUTPATIENT)
Facility: MEDICAL CENTER | Age: 64
End: 2024-01-24
Payer: COMMERCIAL

## 2024-01-29 ENCOUNTER — HOSPITAL ENCOUNTER (OUTPATIENT)
Dept: INFUSION THERAPY | Facility: MEDICAL CENTER | Age: 64
Discharge: HOME | End: 2024-01-29
Payer: COMMERCIAL

## 2024-01-29 ENCOUNTER — OFFICE VISIT (OUTPATIENT)
Dept: ONCOLOGY | Age: 64
End: 2024-01-29
Payer: COMMERCIAL

## 2024-01-29 ENCOUNTER — RESEARCH ENCOUNTER (OUTPATIENT)
Dept: RESEARCH | Age: 64
End: 2024-01-29

## 2024-01-29 ENCOUNTER — TELEPHONE (OUTPATIENT)
Dept: ONCOLOGY | Age: 64
End: 2024-01-29

## 2024-01-29 VITALS
SYSTOLIC BLOOD PRESSURE: 112 MMHG | TEMPERATURE: 97.9 F | DIASTOLIC BLOOD PRESSURE: 73 MMHG | HEART RATE: 80 BPM | WEIGHT: 163.3 LBS | RESPIRATION RATE: 16 BRPM | BODY MASS INDEX: 20.41 KG/M2

## 2024-01-29 DIAGNOSIS — T45.1X5A ANTINEOPLASTIC CHEMOTHERAPY INDUCED ANEMIA: ICD-10-CM

## 2024-01-29 DIAGNOSIS — D64.81 ANTINEOPLASTIC CHEMOTHERAPY INDUCED ANEMIA: ICD-10-CM

## 2024-01-29 DIAGNOSIS — C77.1 METASTASIS TO MEDIASTINAL LYMPH NODE (HCC): ICD-10-CM

## 2024-01-29 DIAGNOSIS — E03.9 ACQUIRED HYPOTHYROIDISM: ICD-10-CM

## 2024-01-29 DIAGNOSIS — C77.0 METASTASIS TO CERVICAL LYMPH NODE (HCC): ICD-10-CM

## 2024-01-29 DIAGNOSIS — C34.11 MALIGNANT NEOPLASM OF UPPER LOBE OF RIGHT LUNG (HCC): Primary | ICD-10-CM

## 2024-01-29 DIAGNOSIS — C34.90 MALIGNANT NEOPLASM OF LUNG, UNSPECIFIED LATERALITY, UNSPECIFIED PART OF LUNG (HCC): ICD-10-CM

## 2024-01-29 LAB
ALBUMIN SERPL-MCNC: 4 G/DL (ref 3.5–5.2)
ALP SERPL-CCNC: 70 U/L (ref 40–129)
ALT SERPL-CCNC: 10 U/L (ref 5–41)
AMYLASE SERPL-CCNC: 89 U/L (ref 28–100)
ANION GAP SERPL CALCULATED.3IONS-SCNC: 9 MMOL/L (ref 9–17)
AST SERPL-CCNC: 18 U/L
BILIRUB SERPL-MCNC: 0.2 MG/DL (ref 0.3–1.2)
BUN SERPL-MCNC: 18 MG/DL (ref 8–23)
BUN/CREAT SERPL: 20 (ref 9–20)
CALCIUM SERPL-MCNC: 8.9 MG/DL (ref 8.6–10.4)
CHLORIDE SERPL-SCNC: 105 MMOL/L (ref 98–107)
CO2 SERPL-SCNC: 25 MMOL/L (ref 20–31)
CORTIS SERPL-MCNC: 9.4 UG/DL (ref 2.5–19.5)
CREAT SERPL-MCNC: 0.9 MG/DL (ref 0.7–1.2)
GFR SERPL CREATININE-BSD FRML MDRD: >60 ML/MIN/1.73M2
GLUCOSE SERPL-MCNC: 106 MG/DL (ref 70–99)
LIPASE SERPL-CCNC: 37 U/L (ref 13–60)
POTASSIUM SERPL-SCNC: 4.4 MMOL/L (ref 3.7–5.3)
PROT SERPL-MCNC: 7.1 G/DL (ref 6.4–8.3)
SODIUM SERPL-SCNC: 139 MMOL/L (ref 135–144)
TSH SERPL DL<=0.05 MIU/L-ACNC: 8.56 UIU/ML (ref 0.3–5)

## 2024-01-29 PROCEDURE — 99211 OFF/OP EST MAY X REQ PHY/QHP: CPT | Performed by: INTERNAL MEDICINE

## 2024-01-29 PROCEDURE — 83690 ASSAY OF LIPASE: CPT

## 2024-01-29 PROCEDURE — 2580000003 HC RX 258: Performed by: INTERNAL MEDICINE

## 2024-01-29 PROCEDURE — 36591 DRAW BLOOD OFF VENOUS DEVICE: CPT

## 2024-01-29 PROCEDURE — 84443 ASSAY THYROID STIM HORMONE: CPT

## 2024-01-29 PROCEDURE — 82150 ASSAY OF AMYLASE: CPT

## 2024-01-29 PROCEDURE — 82533 TOTAL CORTISOL: CPT

## 2024-01-29 PROCEDURE — 96413 CHEMO IV INFUSION 1 HR: CPT

## 2024-01-29 PROCEDURE — 99214 OFFICE O/P EST MOD 30 MIN: CPT | Performed by: INTERNAL MEDICINE

## 2024-01-29 PROCEDURE — 6360000002 HC RX W HCPCS: Performed by: INTERNAL MEDICINE

## 2024-01-29 PROCEDURE — 80053 COMPREHEN METABOLIC PANEL: CPT

## 2024-01-29 RX ORDER — DEXAMETHASONE 2 MG/1
TABLET ORAL
Qty: 30 TABLET | Refills: 0 | Status: SHIPPED | OUTPATIENT
Start: 2024-01-29

## 2024-01-29 RX ORDER — LEVOTHYROXINE SODIUM 0.05 MG/1
TABLET ORAL
Qty: 34 TABLET | Refills: 5 | Status: SHIPPED | OUTPATIENT
Start: 2024-01-29

## 2024-01-29 RX ORDER — FOLIC ACID 1 MG/1
1 TABLET ORAL DAILY
Qty: 90 TABLET | Refills: 1 | Status: SHIPPED | OUTPATIENT
Start: 2024-01-29

## 2024-01-29 RX ORDER — SODIUM CHLORIDE 0.9 % (FLUSH) 0.9 %
5-40 SYRINGE (ML) INJECTION PRN
Status: DISCONTINUED | OUTPATIENT
Start: 2024-01-29 | End: 2024-01-30 | Stop reason: HOSPADM

## 2024-01-29 RX ORDER — SODIUM CHLORIDE 9 MG/ML
5-250 INJECTION, SOLUTION INTRAVENOUS PRN
Status: DISCONTINUED | OUTPATIENT
Start: 2024-01-29 | End: 2024-01-30 | Stop reason: HOSPADM

## 2024-01-29 RX ORDER — HEPARIN 100 UNIT/ML
500 SYRINGE INTRAVENOUS PRN
Status: DISCONTINUED | OUTPATIENT
Start: 2024-01-29 | End: 2024-01-30 | Stop reason: HOSPADM

## 2024-01-29 RX ADMIN — HEPARIN 500 UNITS: 100 SYRINGE at 10:41

## 2024-01-29 RX ADMIN — SODIUM CHLORIDE 20 ML/HR: 9 INJECTION, SOLUTION INTRAVENOUS at 09:05

## 2024-01-29 RX ADMIN — SODIUM CHLORIDE, PRESERVATIVE FREE 10 ML: 5 INJECTION INTRAVENOUS at 10:41

## 2024-01-29 RX ADMIN — SODIUM CHLORIDE, PRESERVATIVE FREE 10 ML: 5 INJECTION INTRAVENOUS at 08:47

## 2024-01-29 RX ADMIN — SODIUM CHLORIDE 200 MG: 9 INJECTION, SOLUTION INTRAVENOUS at 10:03

## 2024-01-29 NOTE — PROGRESS NOTES
Patient arrived ambulatory per self for C7D1 treatment and MD visit.  Patient reports swelling to neck as well as four \"spots\" that are painful to touch.   Patient denies other complaint or concern.  Port accessed, specimen sent. MD in to see patient, OK to treat. CT scan ordered and levothyroxine dose adjusted.   Keytruda infused without issue, line flushed.   Port flushed and heparinized with intact whitten needle removed, band aide applied.   Patient discharged, AVS per .

## 2024-01-29 NOTE — PATIENT INSTRUCTIONS
Proceed with chemo per orders.   Need ct scan in 2 weeks. Rv in 3 weeks, plan to add back alimta to keytruda, please see orders including Alimta and Keytruda with next cycle

## 2024-01-29 NOTE — TELEPHONE ENCOUNTER
[facility] HERE FOR MD VISIT & TX  Proceed with chemo per orders.   Need ct scan in 2 weeks. Rv in 3 weeks, plan to add back alimta to keytruda   CT SCAN IS ON 2/14/24 @ 9:15AM AT [facility] ARRIVAL @ 8:15AM  MD VISIT 2/19/24 @ 9:30AM TX @ 9AM  AVS PRINTED W/ INSTRUCTIONS AND GIVEN  TO PT ON EXIT

## 2024-01-29 NOTE — PROGRESS NOTES
DIAGNOSIS:   Multiple cervical adenopathy  Biopsy showed adenocarcinoma suggestive of lung primary.  Unprovoked deep venous thrombosis in the right lower extremity  Molecular testing showed T p53 and CDK N2a mutations, no targetable mutation  CURRENT THERAPY:  Plan systemic therapy with carboplatin, Alimta and Keytruda  Anticoagulation with Eliquis  After 4 cycles, he had good response so we will plan to drop chemotherapy and continue on maintenance immunotherapy  BRIEF CASE HISTORY:   Jony Portillo is a very pleasant 63 y.o. male who is referred to us for recently diagnosed adenocarcinoma of possible lung primary.  He presented with unprovoked DVT in the right lower extremity.  Because of the nature of his unprovoked DVT and his symptoms of neck swelling, he underwent a CT scan of the chest that showed significant mediastinal adenopathy.  He is sent to us for a consultation, he is having difficulty sleeping and change in voice.  Most of this difficulty in sleeping is secondary to swelling in the neck.  He does not have any chest pain or shortness of breath and he does not have any hemoptysis.  No change in weight.  Tolerating anticoagulation very well.  Confirmation of the biopsy showed that this is adenocarcinoma of lung primary.  PET CT scan showed right hilar mass with mediastinal and cervical adenopathy.  Molecular testing showed no targetable mutation, we decided to treat him with carboplatin, Alimta and Keytruda  After 4 cycles, the PET CT scan showed resolution of the known cervical and mediastinal hilar lymph node.  However, there was some intense activity and some lymph node that was deemed to be reactive.  Overall, it was felt that he is responding well and we decided to continue on maintenance treatment.  INTERIM HISTORY    .  The patient comes in today for follow-up, he has noticed increased swelling in his neck area with some discomfort.  He still has no dysphagia.  Fatigue has improved somewhat.

## 2024-01-29 NOTE — PROGRESS NOTES
SPIRITUAL HEALTH PROGRESS NOTE: Outpatient Oncology Care at [facility]     Spiritual Assessment: Pt and Spouse were in the treatment cubicle of the infusion clinic. Pt shared how he was doing. Pt and Spouse spoke of their plans to travel and the places they would like to visit. Pt noted that he will be back in three weeks.     Intervention: Writer greeted Pt and Spouse. Writer inquired how Pt and Spouse were doing. Writer explored Pt's and Spouse's plans. Writer offered words of support and encouragement.     Outcome: Pt and Spouse thanked writer.     Plan: Chaplains will remain available to provide emotional and spiritual support as needed.       01/29/24 1036   Encounter Summary   Service Provided For: Patient and family together   [facility]   Support System Family members;Spouse   Last Encounter  11/27/23   Complexity of Encounter Moderate   Begin Time 1000   End Time  1005   Total Time Calculated 5 min   Spiritual/Emotional needs   Type Spiritual Support   Assessment/Intervention/Outcome   Assessment Coping;Calm   Intervention Active listening;Explored/Affirmed feelings, thoughts, concerns;Explored Coping Skills/Resources;Sustaining Presence/Ministry of presence;Discussed illness injury and it’s impact   Outcome Expressed feelings, needs, and concerns;Engaged in conversation;Coping;Expressed Gratitude   Plan and Referrals   Plan/Referrals Continue Support (comment)       Electronically signed by Vickie Dumont, Oncology Outpatient   [phone]  1/29/2024  10:38 AM

## 2024-01-29 NOTE — RESEARCH
Jan., 29, 2024    Spoke with patient and  about trial [facility] [id number] Beating Lung Cancer in [place] (BLCIO). Patient had taken consent home previously. Answered all questions concerning study. Patient and  verbalized understanding and patient is willing to consent.      Writer witnessed the signing for the main study, as well as, the Medical Record Abstraction Repository consents. Copies were given to patient.      Holli Cerda RN  Research Coordinator

## 2024-02-14 ENCOUNTER — HOSPITAL ENCOUNTER (OUTPATIENT)
Dept: CT IMAGING | Age: 64
Discharge: HOME | End: 2024-02-16
Attending: INTERNAL MEDICINE
Payer: COMMERCIAL

## 2024-02-14 DIAGNOSIS — C34.11 MALIGNANT NEOPLASM OF UPPER LOBE OF RIGHT LUNG (HCC): ICD-10-CM

## 2024-02-14 DIAGNOSIS — C34.90 MALIGNANT NEOPLASM OF LUNG, UNSPECIFIED LATERALITY, UNSPECIFIED PART OF LUNG (HCC): ICD-10-CM

## 2024-02-14 PROCEDURE — 70491 CT SOFT TISSUE NECK W/DYE: CPT

## 2024-02-14 PROCEDURE — 2580000003 HC RX 258: Performed by: INTERNAL MEDICINE

## 2024-02-14 PROCEDURE — 6360000004 HC RX CONTRAST MEDICATION: Performed by: INTERNAL MEDICINE

## 2024-02-14 PROCEDURE — 71260 CT THORAX DX C+: CPT

## 2024-02-14 RX ORDER — 0.9 % SODIUM CHLORIDE 0.9 %
100 INTRAVENOUS SOLUTION INTRAVENOUS ONCE
Status: COMPLETED | OUTPATIENT
Start: 2024-02-14 | End: 2024-02-14

## 2024-02-14 RX ORDER — SODIUM CHLORIDE 0.9 % (FLUSH) 0.9 %
10 SYRINGE (ML) INJECTION PRN
Status: DISCONTINUED | OUTPATIENT
Start: 2024-02-14 | End: 2024-02-17 | Stop reason: HOSPADM

## 2024-02-14 RX ADMIN — IOPAMIDOL 150 ML: 755 INJECTION, SOLUTION INTRAVENOUS at 09:36

## 2024-02-14 RX ADMIN — SODIUM CHLORIDE 100 ML: 9 INJECTION, SOLUTION INTRAVENOUS at 09:37

## 2024-02-14 RX ADMIN — SODIUM CHLORIDE, PRESERVATIVE FREE 10 ML: 5 INJECTION INTRAVENOUS at 09:37

## 2024-02-15 ENCOUNTER — HOSPITAL ENCOUNTER (OUTPATIENT)
Facility: MEDICAL CENTER | Age: 64
End: 2024-02-15
Payer: COMMERCIAL

## 2024-02-19 ENCOUNTER — HOSPITAL ENCOUNTER (OUTPATIENT)
Dept: INFUSION THERAPY | Facility: MEDICAL CENTER | Age: 64
Discharge: HOME | End: 2024-02-19
Payer: COMMERCIAL

## 2024-02-19 ENCOUNTER — OFFICE VISIT (OUTPATIENT)
Dept: ONCOLOGY | Age: 64
End: 2024-02-19
Payer: COMMERCIAL

## 2024-02-19 ENCOUNTER — TELEPHONE (OUTPATIENT)
Dept: ONCOLOGY | Age: 64
End: 2024-02-19

## 2024-02-19 VITALS
DIASTOLIC BLOOD PRESSURE: 74 MMHG | RESPIRATION RATE: 18 BRPM | TEMPERATURE: 98 F | OXYGEN SATURATION: 98 % | WEIGHT: 174.3 LBS | SYSTOLIC BLOOD PRESSURE: 124 MMHG | BODY MASS INDEX: 21.79 KG/M2

## 2024-02-19 DIAGNOSIS — C34.11 MALIGNANT NEOPLASM OF UPPER LOBE OF RIGHT LUNG (HCC): Primary | ICD-10-CM

## 2024-02-19 DIAGNOSIS — C77.0 METASTASIS TO CERVICAL LYMPH NODE (HCC): ICD-10-CM

## 2024-02-19 DIAGNOSIS — T45.1X5A ANTINEOPLASTIC CHEMOTHERAPY INDUCED ANEMIA: ICD-10-CM

## 2024-02-19 DIAGNOSIS — C34.90 MALIGNANT NEOPLASM OF LUNG, UNSPECIFIED LATERALITY, UNSPECIFIED PART OF LUNG (HCC): ICD-10-CM

## 2024-02-19 DIAGNOSIS — D64.81 ANTINEOPLASTIC CHEMOTHERAPY INDUCED ANEMIA: ICD-10-CM

## 2024-02-19 DIAGNOSIS — C77.1 METASTASIS TO MEDIASTINAL LYMPH NODE (HCC): ICD-10-CM

## 2024-02-19 DIAGNOSIS — E03.9 ACQUIRED HYPOTHYROIDISM: ICD-10-CM

## 2024-02-19 DIAGNOSIS — I82.461 ACUTE DEEP VEIN THROMBOSIS (DVT) OF CALF MUSCLE VEIN OF RIGHT LOWER EXTREMITY (HCC): ICD-10-CM

## 2024-02-19 DIAGNOSIS — E07.9 THYROID MASS: ICD-10-CM

## 2024-02-19 LAB
ALBUMIN SERPL-MCNC: 3.8 G/DL (ref 3.5–5.2)
ALP SERPL-CCNC: 59 U/L (ref 40–129)
ALT SERPL-CCNC: 37 U/L (ref 5–41)
AMYLASE SERPL-CCNC: 94 U/L (ref 28–100)
ANION GAP SERPL CALCULATED.3IONS-SCNC: 11 MMOL/L (ref 9–17)
AST SERPL-CCNC: 20 U/L
BASOPHILS # BLD: 0.08 K/UL (ref 0–0.2)
BASOPHILS NFR BLD: 1 % (ref 0–2)
BILIRUB SERPL-MCNC: 0.3 MG/DL (ref 0.3–1.2)
BUN SERPL-MCNC: 19 MG/DL (ref 8–23)
BUN/CREAT SERPL: 24 (ref 9–20)
CALCIUM SERPL-MCNC: 8.9 MG/DL (ref 8.6–10.4)
CHLORIDE SERPL-SCNC: 103 MMOL/L (ref 98–107)
CO2 SERPL-SCNC: 24 MMOL/L (ref 20–31)
CORTIS SERPL-MCNC: 0.4 UG/DL (ref 2.5–19.5)
CREAT SERPL-MCNC: 0.8 MG/DL (ref 0.7–1.2)
EOSINOPHIL # BLD: 0.16 K/UL (ref 0–0.44)
EOSINOPHILS RELATIVE PERCENT: 1 % (ref 1–4)
ERYTHROCYTE [DISTWIDTH] IN BLOOD BY AUTOMATED COUNT: 14.5 % (ref 11.8–14.4)
GFR SERPL CREATININE-BSD FRML MDRD: >60 ML/MIN/1.73M2
GLUCOSE SERPL-MCNC: 105 MG/DL (ref 70–99)
HCT VFR BLD AUTO: 38.1 % (ref 40.7–50.3)
HGB BLD-MCNC: 12 G/DL (ref 13–17)
IMM GRANULOCYTES # BLD AUTO: 0.11 K/UL (ref 0–0.3)
IMM GRANULOCYTES NFR BLD: 1 %
LIPASE SERPL-CCNC: 32 U/L (ref 13–60)
LYMPHOCYTES NFR BLD: 2.4 K/UL (ref 1.1–3.7)
LYMPHOCYTES RELATIVE PERCENT: 20 % (ref 24–43)
MCH RBC QN AUTO: 31.4 PG (ref 25.2–33.5)
MCHC RBC AUTO-ENTMCNC: 31.5 G/DL (ref 28.4–34.8)
MCV RBC AUTO: 99.7 FL (ref 82.6–102.9)
MONOCYTES NFR BLD: 0.76 K/UL (ref 0.1–1.2)
MONOCYTES NFR BLD: 6 % (ref 3–12)
NEUTROPHILS NFR BLD: 71 % (ref 36–65)
NEUTS SEG NFR BLD: 8.38 K/UL (ref 1.5–8.1)
NRBC BLD-RTO: 0 PER 100 WBC
PLATELET # BLD AUTO: 226 K/UL (ref 138–453)
PMV BLD AUTO: 9.7 FL (ref 8.1–13.5)
POTASSIUM SERPL-SCNC: 3.7 MMOL/L (ref 3.7–5.3)
PROT SERPL-MCNC: 6.6 G/DL (ref 6.4–8.3)
RBC # BLD AUTO: 3.82 M/UL (ref 4.21–5.77)
RBC # BLD: ABNORMAL 10*6/UL
SODIUM SERPL-SCNC: 138 MMOL/L (ref 135–144)
TSH SERPL DL<=0.05 MIU/L-ACNC: 11.68 UIU/ML (ref 0.3–5)
WBC OTHER # BLD: 11.9 K/UL (ref 3.5–11.3)

## 2024-02-19 PROCEDURE — 96372 THER/PROPH/DIAG INJ SC/IM: CPT

## 2024-02-19 PROCEDURE — 85025 COMPLETE CBC W/AUTO DIFF WBC: CPT

## 2024-02-19 PROCEDURE — 6360000002 HC RX W HCPCS: Performed by: INTERNAL MEDICINE

## 2024-02-19 PROCEDURE — 2580000003 HC RX 258: Performed by: INTERNAL MEDICINE

## 2024-02-19 PROCEDURE — 82150 ASSAY OF AMYLASE: CPT

## 2024-02-19 PROCEDURE — 84443 ASSAY THYROID STIM HORMONE: CPT

## 2024-02-19 PROCEDURE — 99214 OFFICE O/P EST MOD 30 MIN: CPT | Performed by: INTERNAL MEDICINE

## 2024-02-19 PROCEDURE — 83690 ASSAY OF LIPASE: CPT

## 2024-02-19 PROCEDURE — 82533 TOTAL CORTISOL: CPT

## 2024-02-19 PROCEDURE — 80053 COMPREHEN METABOLIC PANEL: CPT

## 2024-02-19 PROCEDURE — 99211 OFF/OP EST MAY X REQ PHY/QHP: CPT

## 2024-02-19 PROCEDURE — 36591 DRAW BLOOD OFF VENOUS DEVICE: CPT

## 2024-02-19 RX ORDER — SODIUM CHLORIDE 9 MG/ML
5-250 INJECTION, SOLUTION INTRAVENOUS PRN
OUTPATIENT
Start: 2024-02-26

## 2024-02-19 RX ORDER — EPINEPHRINE 1 MG/ML
0.3 INJECTION, SOLUTION, CONCENTRATE INTRAVENOUS PRN
OUTPATIENT
Start: 2024-02-26

## 2024-02-19 RX ORDER — HEPARIN 100 UNIT/ML
500 SYRINGE INTRAVENOUS PRN
OUTPATIENT
Start: 2024-02-19

## 2024-02-19 RX ORDER — MEPERIDINE HYDROCHLORIDE 50 MG/ML
12.5 INJECTION INTRAMUSCULAR; INTRAVENOUS; SUBCUTANEOUS PRN
OUTPATIENT
Start: 2024-02-26

## 2024-02-19 RX ORDER — CYANOCOBALAMIN 1000 UG/ML
1000 INJECTION, SOLUTION INTRAMUSCULAR; SUBCUTANEOUS
OUTPATIENT
Start: 2024-02-26

## 2024-02-19 RX ORDER — DEXAMETHASONE 2 MG/1
TABLET ORAL
Qty: 30 TABLET | Refills: 0 | Status: CANCELLED | OUTPATIENT
Start: 2024-02-19

## 2024-02-19 RX ORDER — HEPARIN 100 UNIT/ML
500 SYRINGE INTRAVENOUS PRN
Status: DISCONTINUED | OUTPATIENT
Start: 2024-02-19 | End: 2024-02-20 | Stop reason: HOSPADM

## 2024-02-19 RX ORDER — SODIUM CHLORIDE 0.9 % (FLUSH) 0.9 %
5-40 SYRINGE (ML) INJECTION PRN
OUTPATIENT
Start: 2024-02-26

## 2024-02-19 RX ORDER — DIPHENHYDRAMINE HYDROCHLORIDE 50 MG/ML
50 INJECTION INTRAMUSCULAR; INTRAVENOUS
OUTPATIENT
Start: 2024-02-19

## 2024-02-19 RX ORDER — LEVOTHYROXINE SODIUM 0.07 MG/1
75 TABLET ORAL DAILY
Qty: 30 TABLET | Refills: 5 | Status: SHIPPED | OUTPATIENT
Start: 2024-02-19

## 2024-02-19 RX ORDER — SODIUM CHLORIDE 9 MG/ML
25 INJECTION, SOLUTION INTRAVENOUS PRN
OUTPATIENT
Start: 2024-02-19

## 2024-02-19 RX ORDER — DIPHENHYDRAMINE HYDROCHLORIDE 50 MG/ML
50 INJECTION INTRAMUSCULAR; INTRAVENOUS
OUTPATIENT
Start: 2024-02-26

## 2024-02-19 RX ORDER — FAMOTIDINE 10 MG/ML
20 INJECTION, SOLUTION INTRAVENOUS
OUTPATIENT
Start: 2024-02-19

## 2024-02-19 RX ORDER — FAMOTIDINE 10 MG/ML
20 INJECTION, SOLUTION INTRAVENOUS
OUTPATIENT
Start: 2024-02-26

## 2024-02-19 RX ORDER — SODIUM CHLORIDE 0.9 % (FLUSH) 0.9 %
5-40 SYRINGE (ML) INJECTION PRN
Status: DISCONTINUED | OUTPATIENT
Start: 2024-02-19 | End: 2024-02-20 | Stop reason: HOSPADM

## 2024-02-19 RX ORDER — CYANOCOBALAMIN 1000 UG/ML
1000 INJECTION, SOLUTION INTRAMUSCULAR; SUBCUTANEOUS ONCE
Status: CANCELLED | OUTPATIENT
Start: 2024-02-19 | End: 2024-02-19

## 2024-02-19 RX ORDER — ONDANSETRON 2 MG/ML
8 INJECTION INTRAMUSCULAR; INTRAVENOUS
OUTPATIENT
Start: 2024-02-19

## 2024-02-19 RX ORDER — ACETAMINOPHEN 325 MG/1
650 TABLET ORAL
OUTPATIENT
Start: 2024-02-19

## 2024-02-19 RX ORDER — DEXAMETHASONE 2 MG/1
TABLET ORAL
Qty: 30 TABLET | Refills: 0 | Status: SHIPPED | OUTPATIENT
Start: 2024-02-19

## 2024-02-19 RX ORDER — ACETAMINOPHEN 325 MG/1
650 TABLET ORAL
OUTPATIENT
Start: 2024-02-26

## 2024-02-19 RX ORDER — SODIUM CHLORIDE 0.9 % (FLUSH) 0.9 %
5-40 SYRINGE (ML) INJECTION PRN
OUTPATIENT
Start: 2024-02-19

## 2024-02-19 RX ORDER — DEXAMETHASONE SODIUM PHOSPHATE 10 MG/ML
10 INJECTION INTRAMUSCULAR; INTRAVENOUS ONCE
Status: CANCELLED | OUTPATIENT
Start: 2024-02-26 | End: 2024-02-20

## 2024-02-19 RX ORDER — ALBUTEROL SULFATE 90 UG/1
4 AEROSOL, METERED RESPIRATORY (INHALATION) PRN
OUTPATIENT
Start: 2024-02-26

## 2024-02-19 RX ORDER — SODIUM CHLORIDE 9 MG/ML
INJECTION, SOLUTION INTRAVENOUS CONTINUOUS
OUTPATIENT
Start: 2024-02-26

## 2024-02-19 RX ORDER — CYANOCOBALAMIN 1000 UG/ML
1000 INJECTION, SOLUTION INTRAMUSCULAR; SUBCUTANEOUS ONCE
Status: COMPLETED | OUTPATIENT
Start: 2024-02-19 | End: 2024-02-19

## 2024-02-19 RX ORDER — PALONOSETRON 0.05 MG/ML
0.25 INJECTION, SOLUTION INTRAVENOUS ONCE
OUTPATIENT
Start: 2024-02-26 | End: 2024-02-20

## 2024-02-19 RX ORDER — ALBUTEROL SULFATE 90 UG/1
4 AEROSOL, METERED RESPIRATORY (INHALATION) PRN
OUTPATIENT
Start: 2024-02-19

## 2024-02-19 RX ORDER — HEPARIN SODIUM (PORCINE) LOCK FLUSH IV SOLN 100 UNIT/ML 100 UNIT/ML
500 SOLUTION INTRAVENOUS PRN
OUTPATIENT
Start: 2024-02-26

## 2024-02-19 RX ORDER — ONDANSETRON 2 MG/ML
8 INJECTION INTRAMUSCULAR; INTRAVENOUS
OUTPATIENT
Start: 2024-02-26

## 2024-02-19 RX ORDER — SODIUM CHLORIDE 9 MG/ML
INJECTION, SOLUTION INTRAVENOUS CONTINUOUS
OUTPATIENT
Start: 2024-02-19

## 2024-02-19 RX ORDER — EPINEPHRINE 1 MG/ML
0.3 INJECTION, SOLUTION INTRAMUSCULAR; SUBCUTANEOUS PRN
OUTPATIENT
Start: 2024-02-19

## 2024-02-19 RX ADMIN — SODIUM CHLORIDE, PRESERVATIVE FREE 10 ML: 5 INJECTION INTRAVENOUS at 10:23

## 2024-02-19 RX ADMIN — HEPARIN 500 UNITS: 100 SYRINGE at 10:24

## 2024-02-19 RX ADMIN — CYANOCOBALAMIN 1000 MCG: 1000 INJECTION, SOLUTION INTRAMUSCULAR; SUBCUTANEOUS at 10:23

## 2024-02-19 NOTE — PATIENT INSTRUCTIONS
Hold treatment today.  Return next week with new chemotherapy of carbo and Alimta.  Please see new chemotherapy orders  Need cbc, cmp

## 2024-02-19 NOTE — PROGRESS NOTES
DIAGNOSIS:   Multiple cervical adenopathy  Biopsy showed adenocarcinoma suggestive of lung primary.  Unprovoked deep venous thrombosis in the right lower extremity  Molecular testing showed T p53 and CDK N2a mutations, no targetable mutation  CURRENT THERAPY:  Plan systemic therapy with carboplatin, Alimta and Keytruda  Anticoagulation with Eliquis  After 4 cycles, he had good response so we will plan to drop chemotherapy and continue on maintenance immunotherapy  BRIEF CASE HISTORY:   Jony Portillo is a very pleasant 63 y.o. male who is referred to us for recently diagnosed adenocarcinoma of possible lung primary.  He presented with unprovoked DVT in the right lower extremity.  Because of the nature of his unprovoked DVT and his symptoms of neck swelling, he underwent a CT scan of the chest that showed significant mediastinal adenopathy.  He is sent to us for a consultation, he is having difficulty sleeping and change in voice.  Most of this difficulty in sleeping is secondary to swelling in the neck.  He does not have any chest pain or shortness of breath and he does not have any hemoptysis.  No change in weight.  Tolerating anticoagulation very well.  Confirmation of the biopsy showed that this is adenocarcinoma of lung primary.  PET CT scan showed right hilar mass with mediastinal and cervical adenopathy.  Molecular testing showed no targetable mutation, we decided to treat him with carboplatin, Alimta and Keytruda  After 4 cycles, the PET CT scan showed resolution of the known cervical and mediastinal hilar lymph node.  However, there was some intense activity and some lymph node that was deemed to be reactive.  Overall, it was felt that he is responding well and we decided to continue on maintenance treatment.  After 3 cycles, we noticed that there is further growth in the specific lymphadenopathy.  CT scan confirmed unfortunate progression so he is refractory to immunotherapy  We felt that he might still

## 2024-02-19 NOTE — TELEPHONE ENCOUNTER
Hold treatment today.  Return next week with new chemotherapy of carbo and Alimta.  Please see new chemotherapy orders  Need cbc, cmp       Did let patient know once chemo comes back from precert  will call patient to schedule new chemo and doc appointment for next Monday (per Ramirez).        Patient was given AVS and chemo is pending in precert

## 2024-02-19 NOTE — PROGRESS NOTES
Jony arrives ambulatory with spouse for C8D1  Concern about neck mass & CT scan results  Labs drawn per port & reviewed  Pt seen by Dr. Guzmán;orders to hold treatment today & to return next week for new tx of carbo and Alimta.   Vitamin B12 inj admin to Lt deltoid without incident  Band-aid applied  Port flushed & heparinized with intact Odell needle removed per protocol  Pt ambulates off unit with wife;AVS per

## 2024-02-21 ENCOUNTER — TELEPHONE (OUTPATIENT)
Dept: INFUSION THERAPY | Facility: MEDICAL CENTER | Age: 64
End: 2024-02-21

## 2024-02-21 NOTE — TELEPHONE ENCOUNTER
New Chemotherapy Order - Dr. Guzmán      Pathology -  8/15/23 Metastatic Lung Adenocarcinoma         Alimta / Carboplatin - 16 cycles , every 21 days          Ht = 190.5 cm     Wt = 79.1 kg     BSA =  2.05         Alimta - 500 mg/m2 = 1025 mg - rounded to 1000 mg , IV on  Day 1       Carboplatin  - AUC 5 ,  IV on Day 1         Chart to  for scheduling , note to pool for 2nd RN check.

## 2024-02-26 ENCOUNTER — TELEPHONE (OUTPATIENT)
Dept: ONCOLOGY | Age: 64
End: 2024-02-26

## 2024-02-26 ENCOUNTER — HOSPITAL ENCOUNTER (OUTPATIENT)
Dept: INFUSION THERAPY | Facility: MEDICAL CENTER | Age: 64
Discharge: HOME | End: 2024-02-26
Payer: COMMERCIAL

## 2024-02-26 ENCOUNTER — OFFICE VISIT (OUTPATIENT)
Dept: ONCOLOGY | Age: 64
End: 2024-02-26
Payer: COMMERCIAL

## 2024-02-26 VITALS
SYSTOLIC BLOOD PRESSURE: 127 MMHG | HEART RATE: 60 BPM | WEIGHT: 174.3 LBS | RESPIRATION RATE: 16 BRPM | DIASTOLIC BLOOD PRESSURE: 72 MMHG | BODY MASS INDEX: 21.79 KG/M2 | TEMPERATURE: 98.1 F

## 2024-02-26 DIAGNOSIS — C34.11 MALIGNANT NEOPLASM OF UPPER LOBE OF RIGHT LUNG (HCC): ICD-10-CM

## 2024-02-26 DIAGNOSIS — C77.0 METASTASIS TO CERVICAL LYMPH NODE (HCC): Primary | ICD-10-CM

## 2024-02-26 DIAGNOSIS — Z72.0 TOBACCO ABUSE: ICD-10-CM

## 2024-02-26 DIAGNOSIS — C34.11 MALIGNANT NEOPLASM OF UPPER LOBE OF RIGHT LUNG (HCC): Primary | ICD-10-CM

## 2024-02-26 DIAGNOSIS — I82.461 ACUTE DEEP VEIN THROMBOSIS (DVT) OF CALF MUSCLE VEIN OF RIGHT LOWER EXTREMITY (HCC): ICD-10-CM

## 2024-02-26 LAB
ALBUMIN SERPL-MCNC: 3.9 G/DL (ref 3.5–5.2)
ALP SERPL-CCNC: 59 U/L (ref 40–129)
ALT SERPL-CCNC: 13 U/L (ref 5–41)
ANION GAP SERPL CALCULATED.3IONS-SCNC: 10 MMOL/L (ref 9–17)
AST SERPL-CCNC: 17 U/L
BASOPHILS # BLD: 0.06 K/UL (ref 0–0.2)
BASOPHILS NFR BLD: 1 % (ref 0–2)
BILIRUB SERPL-MCNC: 0.3 MG/DL (ref 0.3–1.2)
BUN SERPL-MCNC: 15 MG/DL (ref 8–23)
BUN/CREAT SERPL: 21 (ref 9–20)
CALCIUM SERPL-MCNC: 8.7 MG/DL (ref 8.6–10.4)
CHLORIDE SERPL-SCNC: 104 MMOL/L (ref 98–107)
CO2 SERPL-SCNC: 25 MMOL/L (ref 20–31)
CREAT SERPL-MCNC: 0.7 MG/DL (ref 0.7–1.2)
EOSINOPHIL # BLD: 0.08 K/UL (ref 0–0.44)
EOSINOPHILS RELATIVE PERCENT: 1 % (ref 1–4)
ERYTHROCYTE [DISTWIDTH] IN BLOOD BY AUTOMATED COUNT: 14.3 % (ref 11.8–14.4)
GFR SERPL CREATININE-BSD FRML MDRD: >60 ML/MIN/1.73M2
GLUCOSE SERPL-MCNC: 97 MG/DL (ref 70–99)
HCT VFR BLD AUTO: 37.4 % (ref 40.7–50.3)
HGB BLD-MCNC: 12 G/DL (ref 13–17)
IMM GRANULOCYTES # BLD AUTO: 0.05 K/UL (ref 0–0.3)
IMM GRANULOCYTES NFR BLD: 0 %
LYMPHOCYTES NFR BLD: 1.15 K/UL (ref 1.1–3.7)
LYMPHOCYTES RELATIVE PERCENT: 10 % (ref 24–43)
MCH RBC QN AUTO: 31.6 PG (ref 25.2–33.5)
MCHC RBC AUTO-ENTMCNC: 32.1 G/DL (ref 28.4–34.8)
MCV RBC AUTO: 98.4 FL (ref 82.6–102.9)
MONOCYTES NFR BLD: 0.59 K/UL (ref 0.1–1.2)
MONOCYTES NFR BLD: 5 % (ref 3–12)
NEUTROPHILS NFR BLD: 83 % (ref 36–65)
NEUTS SEG NFR BLD: 9.38 K/UL (ref 1.5–8.1)
NRBC BLD-RTO: 0 PER 100 WBC
PLATELET # BLD AUTO: 244 K/UL (ref 138–453)
PMV BLD AUTO: 10 FL (ref 8.1–13.5)
POTASSIUM SERPL-SCNC: 4.5 MMOL/L (ref 3.7–5.3)
PROT SERPL-MCNC: 7 G/DL (ref 6.4–8.3)
RBC # BLD AUTO: 3.8 M/UL (ref 4.21–5.77)
SODIUM SERPL-SCNC: 139 MMOL/L (ref 135–144)
WBC OTHER # BLD: 11.3 K/UL (ref 3.5–11.3)

## 2024-02-26 PROCEDURE — 96409 CHEMO IV PUSH SNGL DRUG: CPT

## 2024-02-26 PROCEDURE — 36591 DRAW BLOOD OFF VENOUS DEVICE: CPT

## 2024-02-26 PROCEDURE — 99211 OFF/OP EST MAY X REQ PHY/QHP: CPT | Performed by: INTERNAL MEDICINE

## 2024-02-26 PROCEDURE — 96413 CHEMO IV INFUSION 1 HR: CPT

## 2024-02-26 PROCEDURE — 96375 TX/PRO/DX INJ NEW DRUG ADDON: CPT

## 2024-02-26 PROCEDURE — 80053 COMPREHEN METABOLIC PANEL: CPT

## 2024-02-26 PROCEDURE — 2580000003 HC RX 258: Performed by: INTERNAL MEDICINE

## 2024-02-26 PROCEDURE — 6360000002 HC RX W HCPCS: Performed by: INTERNAL MEDICINE

## 2024-02-26 PROCEDURE — 99214 OFFICE O/P EST MOD 30 MIN: CPT | Performed by: INTERNAL MEDICINE

## 2024-02-26 PROCEDURE — 85025 COMPLETE CBC W/AUTO DIFF WBC: CPT

## 2024-02-26 RX ORDER — ALBUTEROL SULFATE 90 UG/1
4 AEROSOL, METERED RESPIRATORY (INHALATION) PRN
OUTPATIENT
Start: 2024-03-18

## 2024-02-26 RX ORDER — BUPROPION HYDROCHLORIDE 150 MG/1
150 TABLET ORAL EVERY MORNING
Qty: 30 TABLET | Refills: 3 | Status: SHIPPED | OUTPATIENT
Start: 2024-02-26

## 2024-02-26 RX ORDER — ACETAMINOPHEN 325 MG/1
650 TABLET ORAL
OUTPATIENT
Start: 2024-03-18

## 2024-02-26 RX ORDER — SODIUM CHLORIDE 0.9 % (FLUSH) 0.9 %
5-40 SYRINGE (ML) INJECTION PRN
OUTPATIENT
Start: 2024-03-18

## 2024-02-26 RX ORDER — PALONOSETRON 0.05 MG/ML
0.25 INJECTION, SOLUTION INTRAVENOUS ONCE
Status: COMPLETED | OUTPATIENT
Start: 2024-02-26 | End: 2024-02-26

## 2024-02-26 RX ORDER — SODIUM CHLORIDE 9 MG/ML
INJECTION, SOLUTION INTRAVENOUS CONTINUOUS
OUTPATIENT
Start: 2024-03-18

## 2024-02-26 RX ORDER — SODIUM CHLORIDE 9 MG/ML
5-250 INJECTION, SOLUTION INTRAVENOUS PRN
Status: DISCONTINUED | OUTPATIENT
Start: 2024-02-26 | End: 2024-02-27 | Stop reason: HOSPADM

## 2024-02-26 RX ORDER — SODIUM CHLORIDE 9 MG/ML
5-250 INJECTION, SOLUTION INTRAVENOUS PRN
OUTPATIENT
Start: 2024-03-18

## 2024-02-26 RX ORDER — SODIUM CHLORIDE 0.9 % (FLUSH) 0.9 %
5-40 SYRINGE (ML) INJECTION PRN
Status: DISCONTINUED | OUTPATIENT
Start: 2024-02-26 | End: 2024-02-27 | Stop reason: HOSPADM

## 2024-02-26 RX ORDER — ONDANSETRON 2 MG/ML
8 INJECTION INTRAMUSCULAR; INTRAVENOUS
OUTPATIENT
Start: 2024-03-18

## 2024-02-26 RX ORDER — PALONOSETRON 0.05 MG/ML
0.25 INJECTION, SOLUTION INTRAVENOUS ONCE
OUTPATIENT
Start: 2024-03-18 | End: 2024-03-18

## 2024-02-26 RX ORDER — EPINEPHRINE 1 MG/ML
0.3 INJECTION, SOLUTION, CONCENTRATE INTRAVENOUS PRN
OUTPATIENT
Start: 2024-03-18

## 2024-02-26 RX ORDER — HEPARIN 100 UNIT/ML
500 SYRINGE INTRAVENOUS PRN
Status: DISCONTINUED | OUTPATIENT
Start: 2024-02-26 | End: 2024-02-27 | Stop reason: HOSPADM

## 2024-02-26 RX ORDER — DIPHENHYDRAMINE HYDROCHLORIDE 50 MG/ML
50 INJECTION INTRAMUSCULAR; INTRAVENOUS
OUTPATIENT
Start: 2024-03-18

## 2024-02-26 RX ORDER — HEPARIN SODIUM (PORCINE) LOCK FLUSH IV SOLN 100 UNIT/ML 100 UNIT/ML
500 SOLUTION INTRAVENOUS PRN
OUTPATIENT
Start: 2024-03-18

## 2024-02-26 RX ORDER — MEPERIDINE HYDROCHLORIDE 50 MG/ML
12.5 INJECTION INTRAMUSCULAR; INTRAVENOUS; SUBCUTANEOUS PRN
OUTPATIENT
Start: 2024-03-18

## 2024-02-26 RX ORDER — FAMOTIDINE 10 MG/ML
20 INJECTION, SOLUTION INTRAVENOUS
OUTPATIENT
Start: 2024-03-18

## 2024-02-26 RX ORDER — DEXAMETHASONE SODIUM PHOSPHATE 10 MG/ML
10 INJECTION INTRAMUSCULAR; INTRAVENOUS ONCE
Status: COMPLETED | OUTPATIENT
Start: 2024-02-26 | End: 2024-02-26

## 2024-02-26 RX ADMIN — CARBOPLATIN 630 MG: 600 INJECTION, SOLUTION INTRAVENOUS at 14:05

## 2024-02-26 RX ADMIN — SODIUM CHLORIDE 20 ML/HR: 9 INJECTION, SOLUTION INTRAVENOUS at 12:53

## 2024-02-26 RX ADMIN — SODIUM CHLORIDE 150 MG: 900 INJECTION, SOLUTION INTRAVENOUS at 12:59

## 2024-02-26 RX ADMIN — DEXAMETHASONE SODIUM PHOSPHATE 10 MG: 10 INJECTION INTRAMUSCULAR; INTRAVENOUS at 12:54

## 2024-02-26 RX ADMIN — PALONOSETRON 0.25 MG: 0.05 INJECTION, SOLUTION INTRAVENOUS at 12:54

## 2024-02-26 RX ADMIN — HEPARIN 500 UNITS: 100 SYRINGE at 14:51

## 2024-02-26 RX ADMIN — PEMETREXED DISODIUM 1000 MG: 500 INJECTION, POWDER, LYOPHILIZED, FOR SOLUTION INTRAVENOUS at 13:39

## 2024-02-26 RX ADMIN — SODIUM CHLORIDE, PRESERVATIVE FREE 10 ML: 5 INJECTION INTRAVENOUS at 14:51

## 2024-02-26 RX ADMIN — SODIUM CHLORIDE, PRESERVATIVE FREE 10 ML: 5 INJECTION INTRAVENOUS at 11:51

## 2024-02-26 NOTE — PROGRESS NOTES
1151    CREATININE 0.7 02/26/2024 1151        Component Value Date/Time    CALCIUM 8.7 02/26/2024 1151    ALKPHOS 59 02/26/2024 1151    AST 17 02/26/2024 1151    ALT 13 02/26/2024 1151    BILITOT 0.3 02/26/2024 1151        Lab Results   Component Value Date    TSH 11.68 (H) 02/19/2024       Pathology  Path Number: [id number]     -- Diagnosis --   RIGHT CERVICAL LYMPH NODE, NEEDLE CORE BIOPSY:   -METASTATIC LUNG ADENOCARCINOMA.   REVIEW OF RADIOLOGICAL RESULTS:   PET CT scan after 4 cycles  IMPRESSION:  1.  Resolution of many of the cervical and mediastinal and hilar lymph nodes  since the PET-CT scan 08/11/2023; however, there are new right axillary and  right cervical metabolically active lymph nodes and intense residual activity  in several additional cervical lymph nodes concerning for disease progression.     2.  New focal activity in the subcutaneous fat in the left scapular region  which could be metastatic disease or focal infectious or inflammatory change.  CT scan after 7 cycles   IMPRESSION:  1. Interval increase in size and number of the right axillary lymph nodes  compared to PET-CT 12/11/2023.  The largest lymph node now measures roughly  1.6 cm short axis and was not present on prior PET-CT.  These are concerning  for infectious, inflammatory or neoplastic process.  2. Emphysematous changes.  No suspicious pulmonary mass or nodule.  IMPRESSION:   Clinical diagnosis of metastatic lung cancer, clinical stage is T2 N2 M1  No evidence of metastatic disease outside of the mediastinum and cervical lymph nodes  Significant dysphagia and change in voice secondary to the tumor in the neck  Plan systemic chemotherapy with carboplatin, Alimta and Keytruda  Molecular testing is negative for targetable mutation  He had transient hyperthyroidism followed by hypothyroidism.  Started on Synthroid.  Will adjust the dose to ensure euthyroid status  PLAN:   CT scan was reviewed with the patient.  Clear progression of

## 2024-02-26 NOTE — TELEPHONE ENCOUNTER
FRANCO HERE FOR MD VISIT & TX  Proced with chemo carleen campbell. Rv in 3 weeks with chemo and cbc, cmp TSH   MD VISIT 3/18/24 @ 11:30AM TX @ 11AM  AVS PRINTED W/ INSTRUCTIONS AND GIVEN  TO PT ON EXIT

## 2024-02-26 NOTE — PROGRESS NOTES
Patient arrived ambulatory per self for C1D1 treatment and MD visit.   Patient denies complaint or concern other than pain to ear related to lymph nodes.  Port accessed, specimen sent.   MD in to see patient, OK to treat.   Patient premedicated.   Alimta infused without issue, line flushed.   Carbo infused without issue, line flushed.   Port flushed and heparinized with intact whitten needle removed, band aid applied.   Patient discharged ambulatory per self, AVS per .

## 2024-02-27 ENCOUNTER — TELEPHONE (OUTPATIENT)
Dept: ONCOLOGY | Age: 64
End: 2024-02-27

## 2024-02-27 NOTE — TELEPHONE ENCOUNTER
Name: Jony Portillo  : 1960  MRN: [id number]    Oncology Navigation Follow-Up Note    Contact Type:  Telephone    Notes:   Navigator spoke with pt. And offering assistance if needed. Pt. Denies concerns or needs and writer will continue to follow.       Electronically signed by Erica Martin RN on 2024 at 3:07 PM

## 2024-03-15 ENCOUNTER — TELEPHONE (OUTPATIENT)
Dept: INFUSION THERAPY | Age: 64
End: 2024-03-15

## 2024-03-15 NOTE — TELEPHONE ENCOUNTER
Pt requesting updated work excuse, current one to  3-18-24 and pt still getting treatment. Work excuse dates updated and faxed to [phone], confirmation received. Updated pt that Dr Hutson is back in office 3-19-24 and will obtain his signature and refax excuse.

## 2024-03-18 ENCOUNTER — OFFICE VISIT (OUTPATIENT)
Dept: ONCOLOGY | Age: 64
End: 2024-03-18
Payer: COMMERCIAL

## 2024-03-18 ENCOUNTER — HOSPITAL ENCOUNTER (OUTPATIENT)
Facility: MEDICAL CENTER | Age: 64
End: 2024-03-18
Payer: COMMERCIAL

## 2024-03-18 ENCOUNTER — HOSPITAL ENCOUNTER (OUTPATIENT)
Dept: INFUSION THERAPY | Facility: MEDICAL CENTER | Age: 64
Discharge: HOME | End: 2024-03-18
Payer: COMMERCIAL

## 2024-03-18 ENCOUNTER — TELEPHONE (OUTPATIENT)
Dept: ONCOLOGY | Age: 64
End: 2024-03-18

## 2024-03-18 VITALS
SYSTOLIC BLOOD PRESSURE: 116 MMHG | BODY MASS INDEX: 20.56 KG/M2 | RESPIRATION RATE: 18 BRPM | TEMPERATURE: 98.2 F | DIASTOLIC BLOOD PRESSURE: 71 MMHG | HEART RATE: 65 BPM | WEIGHT: 164.5 LBS

## 2024-03-18 DIAGNOSIS — E03.9 ACQUIRED HYPOTHYROIDISM: ICD-10-CM

## 2024-03-18 DIAGNOSIS — C34.11 MALIGNANT NEOPLASM OF UPPER LOBE OF RIGHT LUNG (HCC): Primary | ICD-10-CM

## 2024-03-18 DIAGNOSIS — C77.1 METASTASIS TO MEDIASTINAL LYMPH NODE (HCC): ICD-10-CM

## 2024-03-18 DIAGNOSIS — C77.0 METASTASIS TO CERVICAL LYMPH NODE (HCC): ICD-10-CM

## 2024-03-18 DIAGNOSIS — C34.90 MALIGNANT NEOPLASM OF LUNG, UNSPECIFIED LATERALITY, UNSPECIFIED PART OF LUNG (HCC): ICD-10-CM

## 2024-03-18 LAB
ALBUMIN SERPL-MCNC: 4.1 G/DL (ref 3.5–5.2)
ALP SERPL-CCNC: 61 U/L (ref 40–129)
ALT SERPL-CCNC: 12 U/L (ref 5–41)
ANION GAP SERPL CALCULATED.3IONS-SCNC: 12 MMOL/L (ref 9–17)
AST SERPL-CCNC: 13 U/L
BASOPHILS # BLD: <0.03 K/UL (ref 0–0.2)
BASOPHILS NFR BLD: 0 % (ref 0–2)
BILIRUB SERPL-MCNC: 0.2 MG/DL (ref 0.3–1.2)
BUN SERPL-MCNC: 18 MG/DL (ref 8–23)
BUN/CREAT SERPL: 20 (ref 9–20)
CALCIUM SERPL-MCNC: 9 MG/DL (ref 8.6–10.4)
CHLORIDE SERPL-SCNC: 100 MMOL/L (ref 98–107)
CO2 SERPL-SCNC: 24 MMOL/L (ref 20–31)
CREAT SERPL-MCNC: 0.9 MG/DL (ref 0.7–1.2)
EOSINOPHIL # BLD: <0.03 K/UL (ref 0–0.44)
EOSINOPHILS RELATIVE PERCENT: 0 % (ref 1–4)
ERYTHROCYTE [DISTWIDTH] IN BLOOD BY AUTOMATED COUNT: 14.5 % (ref 11.8–14.4)
GFR SERPL CREATININE-BSD FRML MDRD: >60 ML/MIN/1.73M2
GLUCOSE SERPL-MCNC: 86 MG/DL (ref 70–99)
HCT VFR BLD AUTO: 37.4 % (ref 40.7–50.3)
HGB BLD-MCNC: 12.3 G/DL (ref 13–17)
IMM GRANULOCYTES # BLD AUTO: 0.02 K/UL (ref 0–0.3)
IMM GRANULOCYTES NFR BLD: 0 %
LYMPHOCYTES NFR BLD: 1.49 K/UL (ref 1.1–3.7)
LYMPHOCYTES RELATIVE PERCENT: 24 % (ref 24–43)
MCH RBC QN AUTO: 30.8 PG (ref 25.2–33.5)
MCHC RBC AUTO-ENTMCNC: 32.9 G/DL (ref 28.4–34.8)
MCV RBC AUTO: 93.7 FL (ref 82.6–102.9)
MONOCYTES NFR BLD: 0.7 K/UL (ref 0.1–1.2)
MONOCYTES NFR BLD: 11 % (ref 3–12)
NEUTROPHILS NFR BLD: 65 % (ref 36–65)
NEUTS SEG NFR BLD: 4.06 K/UL (ref 1.5–8.1)
NRBC BLD-RTO: 0 PER 100 WBC
PLATELET # BLD AUTO: 451 K/UL (ref 138–453)
PMV BLD AUTO: 8.8 FL (ref 8.1–13.5)
POTASSIUM SERPL-SCNC: 4.2 MMOL/L (ref 3.7–5.3)
PROT SERPL-MCNC: 7.1 G/DL (ref 6.4–8.3)
RBC # BLD AUTO: 3.99 M/UL (ref 4.21–5.77)
RBC # BLD: ABNORMAL 10*6/UL
SODIUM SERPL-SCNC: 136 MMOL/L (ref 135–144)
TSH SERPL DL<=0.05 MIU/L-ACNC: 2.69 UIU/ML (ref 0.3–5)
WBC OTHER # BLD: 6.3 K/UL (ref 3.5–11.3)

## 2024-03-18 PROCEDURE — 96367 TX/PROPH/DG ADDL SEQ IV INF: CPT

## 2024-03-18 PROCEDURE — 96413 CHEMO IV INFUSION 1 HR: CPT

## 2024-03-18 PROCEDURE — 2580000003 HC RX 258: Performed by: INTERNAL MEDICINE

## 2024-03-18 PROCEDURE — 80053 COMPREHEN METABOLIC PANEL: CPT

## 2024-03-18 PROCEDURE — 36591 DRAW BLOOD OFF VENOUS DEVICE: CPT

## 2024-03-18 PROCEDURE — 6360000002 HC RX W HCPCS: Performed by: INTERNAL MEDICINE

## 2024-03-18 PROCEDURE — 96411 CHEMO IV PUSH ADDL DRUG: CPT

## 2024-03-18 PROCEDURE — 99214 OFFICE O/P EST MOD 30 MIN: CPT | Performed by: INTERNAL MEDICINE

## 2024-03-18 PROCEDURE — 84443 ASSAY THYROID STIM HORMONE: CPT

## 2024-03-18 PROCEDURE — 85025 COMPLETE CBC W/AUTO DIFF WBC: CPT

## 2024-03-18 PROCEDURE — 99211 OFF/OP EST MAY X REQ PHY/QHP: CPT

## 2024-03-18 PROCEDURE — 96375 TX/PRO/DX INJ NEW DRUG ADDON: CPT

## 2024-03-18 RX ORDER — PALONOSETRON 0.05 MG/ML
0.25 INJECTION, SOLUTION INTRAVENOUS ONCE
Status: COMPLETED | OUTPATIENT
Start: 2024-03-18 | End: 2024-03-18

## 2024-03-18 RX ORDER — HEPARIN SODIUM (PORCINE) LOCK FLUSH IV SOLN 100 UNIT/ML 100 UNIT/ML
500 SOLUTION INTRAVENOUS PRN
OUTPATIENT
Start: 2024-04-08

## 2024-03-18 RX ORDER — PALONOSETRON 0.05 MG/ML
0.25 INJECTION, SOLUTION INTRAVENOUS ONCE
OUTPATIENT
Start: 2024-04-08 | End: 2024-04-08

## 2024-03-18 RX ORDER — SODIUM CHLORIDE 0.9 % (FLUSH) 0.9 %
5-40 SYRINGE (ML) INJECTION PRN
OUTPATIENT
Start: 2024-04-08

## 2024-03-18 RX ORDER — SODIUM CHLORIDE 9 MG/ML
5-250 INJECTION, SOLUTION INTRAVENOUS PRN
Status: DISCONTINUED | OUTPATIENT
Start: 2024-03-18 | End: 2024-03-19 | Stop reason: HOSPADM

## 2024-03-18 RX ORDER — SODIUM CHLORIDE 0.9 % (FLUSH) 0.9 %
5-40 SYRINGE (ML) INJECTION PRN
Status: DISCONTINUED | OUTPATIENT
Start: 2024-03-18 | End: 2024-03-19 | Stop reason: HOSPADM

## 2024-03-18 RX ORDER — FAMOTIDINE 10 MG/ML
20 INJECTION, SOLUTION INTRAVENOUS
OUTPATIENT
Start: 2024-04-08

## 2024-03-18 RX ORDER — DEXAMETHASONE SODIUM PHOSPHATE 10 MG/ML
10 INJECTION INTRAMUSCULAR; INTRAVENOUS ONCE
Status: COMPLETED | OUTPATIENT
Start: 2024-03-18 | End: 2024-03-18

## 2024-03-18 RX ORDER — EPINEPHRINE 1 MG/ML
0.3 INJECTION, SOLUTION, CONCENTRATE INTRAVENOUS PRN
OUTPATIENT
Start: 2024-04-08

## 2024-03-18 RX ORDER — SODIUM CHLORIDE 9 MG/ML
5-250 INJECTION, SOLUTION INTRAVENOUS PRN
OUTPATIENT
Start: 2024-04-08

## 2024-03-18 RX ORDER — DIPHENHYDRAMINE HYDROCHLORIDE 50 MG/ML
50 INJECTION INTRAMUSCULAR; INTRAVENOUS
OUTPATIENT
Start: 2024-04-08

## 2024-03-18 RX ORDER — MEPERIDINE HYDROCHLORIDE 50 MG/ML
12.5 INJECTION INTRAMUSCULAR; INTRAVENOUS; SUBCUTANEOUS PRN
OUTPATIENT
Start: 2024-04-08

## 2024-03-18 RX ORDER — ACETAMINOPHEN 325 MG/1
650 TABLET ORAL
OUTPATIENT
Start: 2024-04-08

## 2024-03-18 RX ORDER — ALBUTEROL SULFATE 90 UG/1
4 AEROSOL, METERED RESPIRATORY (INHALATION) PRN
OUTPATIENT
Start: 2024-04-08

## 2024-03-18 RX ORDER — ONDANSETRON 2 MG/ML
8 INJECTION INTRAMUSCULAR; INTRAVENOUS
OUTPATIENT
Start: 2024-04-08

## 2024-03-18 RX ORDER — HEPARIN 100 UNIT/ML
500 SYRINGE INTRAVENOUS PRN
Status: DISCONTINUED | OUTPATIENT
Start: 2024-03-18 | End: 2024-03-19 | Stop reason: HOSPADM

## 2024-03-18 RX ORDER — SODIUM CHLORIDE 9 MG/ML
INJECTION, SOLUTION INTRAVENOUS CONTINUOUS
OUTPATIENT
Start: 2024-04-08

## 2024-03-18 RX ADMIN — CARBOPLATIN 570 MG: 10 INJECTION INTRAVENOUS at 15:07

## 2024-03-18 RX ADMIN — DEXAMETHASONE SODIUM PHOSPHATE 10 MG: 10 INJECTION INTRAMUSCULAR; INTRAVENOUS at 13:23

## 2024-03-18 RX ADMIN — HEPARIN 500 UNITS: 100 SYRINGE at 15:43

## 2024-03-18 RX ADMIN — PALONOSETRON 0.25 MG: 0.05 INJECTION, SOLUTION INTRAVENOUS at 13:21

## 2024-03-18 RX ADMIN — SODIUM CHLORIDE 50 ML/HR: 9 INJECTION, SOLUTION INTRAVENOUS at 13:19

## 2024-03-18 RX ADMIN — SODIUM CHLORIDE 150 MG: 900 INJECTION, SOLUTION INTRAVENOUS at 13:36

## 2024-03-18 RX ADMIN — SODIUM CHLORIDE, PRESERVATIVE FREE 20 ML: 5 INJECTION INTRAVENOUS at 15:43

## 2024-03-18 RX ADMIN — SODIUM CHLORIDE, PRESERVATIVE FREE 40 ML: 5 INJECTION INTRAVENOUS at 11:20

## 2024-03-18 RX ADMIN — PEMETREXED DISODIUM 1000 MG: 500 INJECTION, POWDER, LYOPHILIZED, FOR SOLUTION INTRAVENOUS at 14:34

## 2024-03-18 NOTE — PROGRESS NOTES
DIAGNOSIS:   Multiple cervical adenopathy  Biopsy showed adenocarcinoma suggestive of lung primary.  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  BRIEF CASE HISTORY:   Joyn Portillo is a very pleasant 63 y.o. male who is referred to us for recently diagnosed adenocarcinoma of possible lung primary.  He presented with unprovoked DVT in the right lower extremity.  Because of the nature of his unprovoked DVT and his symptoms of neck swelling, he underwent a CT scan of the chest that showed significant mediastinal adenopathy.  He is sent to us for a consultation, he is having difficulty sleeping and change in voice.  Most of this difficulty in sleeping is secondary to swelling in the neck.  He does not have any chest pain or shortness of breath and he does not have any hemoptysis.  No change in weight.  Tolerating anticoagulation very well.  Confirmation of the biopsy showed that this is adenocarcinoma of lung primary.  PET CT scan showed right hilar mass with mediastinal and cervical adenopathy.  Molecular testing showed no targetable mutation, we decided to treat him with carboplatin, Alimta and Keytruda  After 4 cycles, the PET CT scan showed resolution of the known cervical and mediastinal hilar lymph node.  However, there was some intense activity and some lymph node that was deemed to be reactive.  Overall, it was felt that he is responding well and we decided to continue on maintenance treatment.  After 3 cycles, we noticed that there is further growth in the

## 2024-03-18 NOTE — TELEPHONE ENCOUNTER
FRANCO HERE FOR MD VISIT & TX  Continue chemo per orders, rv in 3 weeks with chemo and labs cbc, cmp TSH  MD VISIT ON: 4/8/24 @8:45AM TX@ 8AM   AVS PRINTED AND GIVEN ON EXIT

## 2024-03-18 NOTE — PROGRESS NOTES
Pt arrives per amb with wife and labs and orders reviewed and pt seen per md and OK to proceed with treatment and NS flushing line before and after premeds and chemo and no reactions or complaints and blood return present throughout infusions and pt discharged per amb with wife with AVS with next appts from .

## 2024-03-19 ENCOUNTER — TELEPHONE (OUTPATIENT)
Dept: ONCOLOGY | Age: 64
End: 2024-03-19

## 2024-03-19 NOTE — TELEPHONE ENCOUNTER
Name: Jony Portillo  : 1960  MRN: [id number]    Oncology Navigation Follow-Up Note    Contact Type:  Telephone    Notes:   Navigator spoke with pt. Offering assistance if needed, pt. Denies needs.       Electronically signed by Erica Martin RN on 3/19/2024 at 10:59 AM

## 2024-03-20 ENCOUNTER — CLINICAL DOCUMENTATION (OUTPATIENT)
Facility: HOSPITAL | Age: 64
End: 2024-03-20

## 2024-03-20 DIAGNOSIS — C34.11 MALIGNANT NEOPLASM OF UPPER LOBE OF RIGHT LUNG (HCC): ICD-10-CM

## 2024-03-20 NOTE — PROGRESS NOTES
Patient Assistance               Additional notes: Reviewed; no assistance available.

## 2024-03-22 RX ORDER — DEXAMETHASONE 2 MG/1
TABLET ORAL
Qty: 30 TABLET | Refills: 0 | Status: SHIPPED | OUTPATIENT
Start: 2024-03-22

## 2024-04-03 ENCOUNTER — HOSPITAL ENCOUNTER (OUTPATIENT)
Facility: MEDICAL CENTER | Age: 64
End: 2024-04-03
Payer: COMMERCIAL

## 2024-04-04 ENCOUNTER — TELEPHONE (OUTPATIENT)
Dept: INFUSION THERAPY | Age: 64
End: 2024-04-04

## 2024-04-04 NOTE — TELEPHONE ENCOUNTER
Disability paperwork completed and faxed to the [employer] at [phone] with confirmation. Documents scanned into chart.

## 2024-04-08 ENCOUNTER — OFFICE VISIT (OUTPATIENT)
Dept: ONCOLOGY | Age: 64
End: 2024-04-08
Payer: COMMERCIAL

## 2024-04-08 ENCOUNTER — HOSPITAL ENCOUNTER (OUTPATIENT)
Dept: INFUSION THERAPY | Facility: MEDICAL CENTER | Age: 64
Discharge: HOME | End: 2024-04-08
Payer: COMMERCIAL

## 2024-04-08 ENCOUNTER — TELEPHONE (OUTPATIENT)
Dept: ONCOLOGY | Age: 64
End: 2024-04-08

## 2024-04-08 VITALS
WEIGHT: 166.5 LBS | SYSTOLIC BLOOD PRESSURE: 110 MMHG | TEMPERATURE: 97.7 F | HEART RATE: 65 BPM | RESPIRATION RATE: 18 BRPM | DIASTOLIC BLOOD PRESSURE: 74 MMHG | BODY MASS INDEX: 20.81 KG/M2

## 2024-04-08 DIAGNOSIS — E03.9 ACQUIRED HYPOTHYROIDISM: ICD-10-CM

## 2024-04-08 DIAGNOSIS — C34.90 MALIGNANT NEOPLASM OF LUNG, UNSPECIFIED LATERALITY, UNSPECIFIED PART OF LUNG (HCC): ICD-10-CM

## 2024-04-08 DIAGNOSIS — C34.11 MALIGNANT NEOPLASM OF UPPER LOBE OF RIGHT LUNG (HCC): ICD-10-CM

## 2024-04-08 DIAGNOSIS — C77.1 METASTASIS TO MEDIASTINAL LYMPH NODE (HCC): ICD-10-CM

## 2024-04-08 DIAGNOSIS — C34.11 MALIGNANT NEOPLASM OF UPPER LOBE OF RIGHT LUNG (HCC): Primary | ICD-10-CM

## 2024-04-08 DIAGNOSIS — E03.9 ACQUIRED HYPOTHYROIDISM: Primary | ICD-10-CM

## 2024-04-08 DIAGNOSIS — C77.0 METASTASIS TO CERVICAL LYMPH NODE (HCC): ICD-10-CM

## 2024-04-08 DIAGNOSIS — Z72.0 TOBACCO ABUSE: ICD-10-CM

## 2024-04-08 LAB
ALBUMIN SERPL-MCNC: 3.9 G/DL (ref 3.5–5.2)
ALP SERPL-CCNC: 57 U/L (ref 40–129)
ALT SERPL-CCNC: 9 U/L (ref 5–41)
ANION GAP SERPL CALCULATED.3IONS-SCNC: 10 MMOL/L (ref 9–17)
AST SERPL-CCNC: 14 U/L
BASOPHILS # BLD: 0.03 K/UL (ref 0–0.2)
BASOPHILS NFR BLD: 0 % (ref 0–2)
BILIRUB SERPL-MCNC: 0.2 MG/DL (ref 0.3–1.2)
BUN SERPL-MCNC: 14 MG/DL (ref 8–23)
BUN/CREAT SERPL: 16 (ref 9–20)
CALCIUM SERPL-MCNC: 9.1 MG/DL (ref 8.6–10.4)
CHLORIDE SERPL-SCNC: 104 MMOL/L (ref 98–107)
CO2 SERPL-SCNC: 26 MMOL/L (ref 20–31)
CREAT SERPL-MCNC: 0.9 MG/DL (ref 0.7–1.2)
EOSINOPHIL # BLD: 0.03 K/UL (ref 0–0.44)
EOSINOPHILS RELATIVE PERCENT: 0 % (ref 1–4)
ERYTHROCYTE [DISTWIDTH] IN BLOOD BY AUTOMATED COUNT: 15.9 % (ref 11.8–14.4)
GFR SERPL CREATININE-BSD FRML MDRD: >90 ML/MIN/1.73M2
GLUCOSE SERPL-MCNC: 91 MG/DL (ref 70–99)
HCT VFR BLD AUTO: 35.6 % (ref 40.7–50.3)
HGB BLD-MCNC: 11.7 G/DL (ref 13–17)
IMM GRANULOCYTES # BLD AUTO: 0.02 K/UL (ref 0–0.3)
IMM GRANULOCYTES NFR BLD: 0 %
LYMPHOCYTES NFR BLD: 2.32 K/UL (ref 1.1–3.7)
LYMPHOCYTES RELATIVE PERCENT: 34 % (ref 24–43)
MCH RBC QN AUTO: 30.5 PG (ref 25.2–33.5)
MCHC RBC AUTO-ENTMCNC: 32.9 G/DL (ref 28.4–34.8)
MCV RBC AUTO: 92.7 FL (ref 82.6–102.9)
MONOCYTES NFR BLD: 0.65 K/UL (ref 0.1–1.2)
MONOCYTES NFR BLD: 9 % (ref 3–12)
NEUTROPHILS NFR BLD: 57 % (ref 36–65)
NEUTS SEG NFR BLD: 3.84 K/UL (ref 1.5–8.1)
NRBC BLD-RTO: 0 PER 100 WBC
PLATELET # BLD AUTO: 407 K/UL (ref 138–453)
PMV BLD AUTO: 9 FL (ref 8.1–13.5)
POTASSIUM SERPL-SCNC: 4 MMOL/L (ref 3.7–5.3)
PROT SERPL-MCNC: 7.1 G/DL (ref 6.4–8.3)
RBC # BLD AUTO: 3.84 M/UL (ref 4.21–5.77)
RBC # BLD: ABNORMAL 10*6/UL
SODIUM SERPL-SCNC: 140 MMOL/L (ref 135–144)
TSH SERPL DL<=0.05 MIU/L-ACNC: 5.55 UIU/ML (ref 0.3–5)
WBC OTHER # BLD: 6.9 K/UL (ref 3.5–11.3)

## 2024-04-08 PROCEDURE — 6360000002 HC RX W HCPCS: Performed by: INTERNAL MEDICINE

## 2024-04-08 PROCEDURE — 84443 ASSAY THYROID STIM HORMONE: CPT

## 2024-04-08 PROCEDURE — 96411 CHEMO IV PUSH ADDL DRUG: CPT

## 2024-04-08 PROCEDURE — 80053 COMPREHEN METABOLIC PANEL: CPT

## 2024-04-08 PROCEDURE — 96375 TX/PRO/DX INJ NEW DRUG ADDON: CPT

## 2024-04-08 PROCEDURE — 99211 OFF/OP EST MAY X REQ PHY/QHP: CPT

## 2024-04-08 PROCEDURE — 2580000003 HC RX 258: Performed by: INTERNAL MEDICINE

## 2024-04-08 PROCEDURE — 36591 DRAW BLOOD OFF VENOUS DEVICE: CPT

## 2024-04-08 PROCEDURE — 96367 TX/PROPH/DG ADDL SEQ IV INF: CPT

## 2024-04-08 PROCEDURE — 85025 COMPLETE CBC W/AUTO DIFF WBC: CPT

## 2024-04-08 PROCEDURE — 96413 CHEMO IV INFUSION 1 HR: CPT

## 2024-04-08 RX ORDER — EPINEPHRINE 1 MG/ML
0.3 INJECTION, SOLUTION, CONCENTRATE INTRAVENOUS PRN
OUTPATIENT
Start: 2024-04-29

## 2024-04-08 RX ORDER — ALBUTEROL SULFATE 90 UG/1
4 AEROSOL, METERED RESPIRATORY (INHALATION) PRN
OUTPATIENT
Start: 2024-04-29

## 2024-04-08 RX ORDER — MEPERIDINE HYDROCHLORIDE 50 MG/ML
12.5 INJECTION INTRAMUSCULAR; INTRAVENOUS; SUBCUTANEOUS PRN
OUTPATIENT
Start: 2024-04-29

## 2024-04-08 RX ORDER — SODIUM CHLORIDE 9 MG/ML
5-250 INJECTION, SOLUTION INTRAVENOUS PRN
OUTPATIENT
Start: 2024-04-29

## 2024-04-08 RX ORDER — HEPARIN SODIUM (PORCINE) LOCK FLUSH IV SOLN 100 UNIT/ML 100 UNIT/ML
500 SOLUTION INTRAVENOUS PRN
OUTPATIENT
Start: 2024-04-29

## 2024-04-08 RX ORDER — ACETAMINOPHEN 325 MG/1
650 TABLET ORAL
OUTPATIENT
Start: 2024-04-29

## 2024-04-08 RX ORDER — PALONOSETRON 0.05 MG/ML
0.25 INJECTION, SOLUTION INTRAVENOUS ONCE
OUTPATIENT
Start: 2024-04-29 | End: 2024-04-29

## 2024-04-08 RX ORDER — SODIUM CHLORIDE 9 MG/ML
INJECTION, SOLUTION INTRAVENOUS CONTINUOUS
OUTPATIENT
Start: 2024-04-29

## 2024-04-08 RX ORDER — HEPARIN 100 UNIT/ML
500 SYRINGE INTRAVENOUS PRN
Status: DISCONTINUED | OUTPATIENT
Start: 2024-04-08 | End: 2024-04-09 | Stop reason: HOSPADM

## 2024-04-08 RX ORDER — DIPHENHYDRAMINE HYDROCHLORIDE 50 MG/ML
50 INJECTION INTRAMUSCULAR; INTRAVENOUS
OUTPATIENT
Start: 2024-04-29

## 2024-04-08 RX ORDER — SODIUM CHLORIDE 0.9 % (FLUSH) 0.9 %
5-40 SYRINGE (ML) INJECTION PRN
OUTPATIENT
Start: 2024-04-29

## 2024-04-08 RX ORDER — PALONOSETRON 0.05 MG/ML
0.25 INJECTION, SOLUTION INTRAVENOUS ONCE
Status: COMPLETED | OUTPATIENT
Start: 2024-04-08 | End: 2024-04-08

## 2024-04-08 RX ORDER — DEXAMETHASONE SODIUM PHOSPHATE 10 MG/ML
10 INJECTION INTRAMUSCULAR; INTRAVENOUS ONCE
Status: COMPLETED | OUTPATIENT
Start: 2024-04-08 | End: 2024-04-08

## 2024-04-08 RX ORDER — SODIUM CHLORIDE 0.9 % (FLUSH) 0.9 %
5-40 SYRINGE (ML) INJECTION PRN
Status: DISCONTINUED | OUTPATIENT
Start: 2024-04-08 | End: 2024-04-09 | Stop reason: HOSPADM

## 2024-04-08 RX ORDER — FAMOTIDINE 10 MG/ML
20 INJECTION, SOLUTION INTRAVENOUS
OUTPATIENT
Start: 2024-04-29

## 2024-04-08 RX ORDER — SODIUM CHLORIDE 9 MG/ML
5-250 INJECTION, SOLUTION INTRAVENOUS PRN
Status: DISCONTINUED | OUTPATIENT
Start: 2024-04-08 | End: 2024-04-09 | Stop reason: HOSPADM

## 2024-04-08 RX ORDER — DEXAMETHASONE 2 MG/1
TABLET ORAL
Qty: 30 TABLET | Refills: 0 | Status: SHIPPED | OUTPATIENT
Start: 2024-04-08

## 2024-04-08 RX ORDER — ONDANSETRON 2 MG/ML
8 INJECTION INTRAMUSCULAR; INTRAVENOUS
OUTPATIENT
Start: 2024-04-29

## 2024-04-08 RX ORDER — CYANOCOBALAMIN 1000 UG/ML
1000 INJECTION, SOLUTION INTRAMUSCULAR; SUBCUTANEOUS ONCE
OUTPATIENT
Start: 2024-04-29 | End: 2024-04-29

## 2024-04-08 RX ADMIN — SODIUM CHLORIDE 50 ML/HR: 9 INJECTION, SOLUTION INTRAVENOUS at 08:29

## 2024-04-08 RX ADMIN — SODIUM CHLORIDE 150 MG: 900 INJECTION, SOLUTION INTRAVENOUS at 09:51

## 2024-04-08 RX ADMIN — SODIUM CHLORIDE, PRESERVATIVE FREE 20 ML: 5 INJECTION INTRAVENOUS at 12:03

## 2024-04-08 RX ADMIN — DEXAMETHASONE SODIUM PHOSPHATE 10 MG: 10 INJECTION INTRAMUSCULAR; INTRAVENOUS at 09:38

## 2024-04-08 RX ADMIN — CARBOPLATIN 570 MG: 10 INJECTION INTRAVENOUS at 10:57

## 2024-04-08 RX ADMIN — PALONOSETRON 0.25 MG: 0.05 INJECTION, SOLUTION INTRAVENOUS at 09:37

## 2024-04-08 RX ADMIN — SODIUM CHLORIDE, PRESERVATIVE FREE 30 ML: 5 INJECTION INTRAVENOUS at 08:20

## 2024-04-08 RX ADMIN — PEMETREXED DISODIUM 1000 MG: 500 INJECTION, POWDER, LYOPHILIZED, FOR SOLUTION INTRAVENOUS at 10:35

## 2024-04-08 RX ADMIN — HEPARIN 500 UNITS: 100 SYRINGE at 12:03

## 2024-04-08 NOTE — TELEPHONE ENCOUNTER
FRANCO HERE FOR FOLLOW UP & TX   Proceed with treatment today as per orders, return in 3 weeks with chemotherapy and labs CBC CMP TSH  MD VISIT: 4/29/24 @ 9:30AM TX @ 9AM   AVS PRINTED AND GIVEN ON EXIT

## 2024-04-08 NOTE — PROGRESS NOTES
DIAGNOSIS:   Multiple cervical adenopathy  Biopsy showed adenocarcinoma suggestive of lung primary.  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  BRIEF CASE HISTORY:   Jony Portillo is a very pleasant 63 y.o. male who is referred to us for recently diagnosed adenocarcinoma of possible lung primary.  He presented with unprovoked DVT in the right lower extremity.  Because of the nature of his unprovoked DVT and his symptoms of neck swelling, he underwent a CT scan of the chest that showed significant mediastinal adenopathy.  He is sent to us for a consultation, he is having difficulty sleeping and change in voice.  Most of this difficulty in sleeping is secondary to swelling in the neck.  He does not have any chest pain or shortness of breath and he does not have any hemoptysis.  No change in weight.  Tolerating anticoagulation very well.  Confirmation of the biopsy showed that this is adenocarcinoma of lung primary.  PET CT scan showed right hilar mass with mediastinal and cervical adenopathy.  Molecular testing showed no targetable mutation, we decided to treat him with carboplatin, Alimta and Keytruda  After 4 cycles, the PET CT scan showed resolution of the known cervical and mediastinal hilar lymph node.  However, there was some intense activity and some lymph node that was deemed to be reactive.  Overall, it was felt that he is responding well and we decided to continue on maintenance treatment.  After 3 cycles, we noticed that there is further growth in the

## 2024-04-08 NOTE — TELEPHONE ENCOUNTER
Name: Jony Portillo  : 1960  MRN: [id number]    Oncology Navigation Follow-Up Note    Contact Type:  Medical Oncology    Notes:   Navigator met with pt. And spouse and no needs or barriers to care.       Electronically signed by Erica Martin RN on 2024 at 9:17 AM

## 2024-04-08 NOTE — PROGRESS NOTES
Pt arrives per amb with wife and pt seen per md and labs and orders reviewed and NS flushing line before and after premeds and chemo and no reactions or complaints and blood return present throughout infusions and pt discharged per amb with wife with AVS with next appts from .

## 2024-04-08 NOTE — PATIENT INSTRUCTIONS
Proceed with treatment today as per orders, return in 3 weeks with chemotherapy and labs CBC CMP TSH

## 2024-04-16 ENCOUNTER — HOSPITAL ENCOUNTER (OUTPATIENT)
Facility: MEDICAL CENTER | Age: 64
End: 2024-04-16
Payer: COMMERCIAL

## 2024-04-29 ENCOUNTER — HOSPITAL ENCOUNTER (OUTPATIENT)
Dept: INFUSION THERAPY | Facility: MEDICAL CENTER | Age: 64
Discharge: HOME | End: 2024-04-29
Payer: COMMERCIAL

## 2024-04-29 ENCOUNTER — OFFICE VISIT (OUTPATIENT)
Dept: ONCOLOGY | Age: 64
End: 2024-04-29
Payer: COMMERCIAL

## 2024-04-29 ENCOUNTER — TELEPHONE (OUTPATIENT)
Dept: ONCOLOGY | Age: 64
End: 2024-04-29

## 2024-04-29 VITALS
HEART RATE: 73 BPM | TEMPERATURE: 98.1 F | DIASTOLIC BLOOD PRESSURE: 68 MMHG | WEIGHT: 168.6 LBS | BODY MASS INDEX: 21.07 KG/M2 | SYSTOLIC BLOOD PRESSURE: 111 MMHG | RESPIRATION RATE: 18 BRPM

## 2024-04-29 DIAGNOSIS — C77.0 METASTASIS TO CERVICAL LYMPH NODE (HCC): ICD-10-CM

## 2024-04-29 DIAGNOSIS — C77.1 METASTASIS TO MEDIASTINAL LYMPH NODE (HCC): ICD-10-CM

## 2024-04-29 DIAGNOSIS — E03.9 ACQUIRED HYPOTHYROIDISM: Primary | ICD-10-CM

## 2024-04-29 DIAGNOSIS — C34.90 MALIGNANT NEOPLASM OF LUNG, UNSPECIFIED LATERALITY, UNSPECIFIED PART OF LUNG (HCC): ICD-10-CM

## 2024-04-29 DIAGNOSIS — C34.11 MALIGNANT NEOPLASM OF UPPER LOBE OF RIGHT LUNG (HCC): Primary | ICD-10-CM

## 2024-04-29 DIAGNOSIS — E03.9 ACQUIRED HYPOTHYROIDISM: ICD-10-CM

## 2024-04-29 DIAGNOSIS — C34.11 MALIGNANT NEOPLASM OF UPPER LOBE OF RIGHT LUNG (HCC): ICD-10-CM

## 2024-04-29 DIAGNOSIS — Z72.0 TOBACCO ABUSE: ICD-10-CM

## 2024-04-29 LAB
ALBUMIN SERPL-MCNC: 3.9 G/DL (ref 3.5–5.2)
ALP SERPL-CCNC: 46 U/L (ref 40–129)
ALT SERPL-CCNC: 11 U/L (ref 5–41)
ANION GAP SERPL CALCULATED.3IONS-SCNC: 10 MMOL/L (ref 9–17)
AST SERPL-CCNC: 15 U/L
BASOPHILS # BLD: 0.03 K/UL (ref 0–0.2)
BASOPHILS NFR BLD: 1 % (ref 0–2)
BILIRUB SERPL-MCNC: 0.1 MG/DL (ref 0.3–1.2)
BUN SERPL-MCNC: 21 MG/DL (ref 8–23)
BUN/CREAT SERPL: 23 (ref 9–20)
CALCIUM SERPL-MCNC: 8.6 MG/DL (ref 8.6–10.4)
CHLORIDE SERPL-SCNC: 105 MMOL/L (ref 98–107)
CO2 SERPL-SCNC: 25 MMOL/L (ref 20–31)
CREAT SERPL-MCNC: 0.9 MG/DL (ref 0.7–1.2)
EOSINOPHIL # BLD: 0.03 K/UL (ref 0–0.44)
EOSINOPHILS RELATIVE PERCENT: 1 % (ref 1–4)
ERYTHROCYTE [DISTWIDTH] IN BLOOD BY AUTOMATED COUNT: 18.5 % (ref 11.8–14.4)
GFR SERPL CREATININE-BSD FRML MDRD: >90 ML/MIN/1.73M2
GLUCOSE SERPL-MCNC: 119 MG/DL (ref 70–99)
HCT VFR BLD AUTO: 32.7 % (ref 40.7–50.3)
HGB BLD-MCNC: 10.6 G/DL (ref 13–17)
IMM GRANULOCYTES # BLD AUTO: 0.06 K/UL (ref 0–0.3)
IMM GRANULOCYTES NFR BLD: 1 %
LYMPHOCYTES NFR BLD: 2.71 K/UL (ref 1.1–3.7)
LYMPHOCYTES RELATIVE PERCENT: 48 % (ref 24–43)
MCH RBC QN AUTO: 30.4 PG (ref 25.2–33.5)
MCHC RBC AUTO-ENTMCNC: 32.4 G/DL (ref 28.4–34.8)
MCV RBC AUTO: 93.7 FL (ref 82.6–102.9)
MONOCYTES NFR BLD: 1.06 K/UL (ref 0.1–1.2)
MONOCYTES NFR BLD: 19 % (ref 3–12)
NEUTROPHILS NFR BLD: 30 % (ref 36–65)
NEUTS SEG NFR BLD: 1.64 K/UL (ref 1.5–8.1)
NRBC BLD-RTO: 0 PER 100 WBC
PLATELET # BLD AUTO: 356 K/UL (ref 138–453)
PMV BLD AUTO: 9.1 FL (ref 8.1–13.5)
POTASSIUM SERPL-SCNC: 3.8 MMOL/L (ref 3.7–5.3)
PROT SERPL-MCNC: 6.7 G/DL (ref 6.4–8.3)
RBC # BLD AUTO: 3.49 M/UL (ref 4.21–5.77)
RBC # BLD: ABNORMAL 10*6/UL
SODIUM SERPL-SCNC: 140 MMOL/L (ref 135–144)
TSH SERPL DL<=0.05 MIU/L-ACNC: 6.4 UIU/ML (ref 0.3–5)
WBC OTHER # BLD: 5.5 K/UL (ref 3.5–11.3)

## 2024-04-29 PROCEDURE — 85025 COMPLETE CBC W/AUTO DIFF WBC: CPT

## 2024-04-29 PROCEDURE — 2580000003 HC RX 258: Performed by: INTERNAL MEDICINE

## 2024-04-29 PROCEDURE — 99211 OFF/OP EST MAY X REQ PHY/QHP: CPT

## 2024-04-29 PROCEDURE — 96372 THER/PROPH/DIAG INJ SC/IM: CPT

## 2024-04-29 PROCEDURE — 36591 DRAW BLOOD OFF VENOUS DEVICE: CPT

## 2024-04-29 PROCEDURE — 6360000002 HC RX W HCPCS: Performed by: INTERNAL MEDICINE

## 2024-04-29 PROCEDURE — 84443 ASSAY THYROID STIM HORMONE: CPT

## 2024-04-29 PROCEDURE — 96413 CHEMO IV INFUSION 1 HR: CPT

## 2024-04-29 PROCEDURE — 96411 CHEMO IV PUSH ADDL DRUG: CPT

## 2024-04-29 PROCEDURE — 96367 TX/PROPH/DG ADDL SEQ IV INF: CPT

## 2024-04-29 PROCEDURE — 99214 OFFICE O/P EST MOD 30 MIN: CPT | Performed by: INTERNAL MEDICINE

## 2024-04-29 PROCEDURE — 80053 COMPREHEN METABOLIC PANEL: CPT

## 2024-04-29 PROCEDURE — 96375 TX/PRO/DX INJ NEW DRUG ADDON: CPT

## 2024-04-29 RX ORDER — DEXAMETHASONE SODIUM PHOSPHATE 10 MG/ML
10 INJECTION INTRAMUSCULAR; INTRAVENOUS ONCE
Status: COMPLETED | OUTPATIENT
Start: 2024-04-29 | End: 2024-04-29

## 2024-04-29 RX ORDER — DEXAMETHASONE 2 MG/1
TABLET ORAL
Qty: 30 TABLET | Refills: 0 | Status: SHIPPED | OUTPATIENT
Start: 2024-04-29

## 2024-04-29 RX ORDER — SODIUM CHLORIDE 0.9 % (FLUSH) 0.9 %
5-40 SYRINGE (ML) INJECTION PRN
Status: DISCONTINUED | OUTPATIENT
Start: 2024-04-29 | End: 2024-04-30 | Stop reason: HOSPADM

## 2024-04-29 RX ORDER — PALONOSETRON 0.05 MG/ML
0.25 INJECTION, SOLUTION INTRAVENOUS ONCE
Status: COMPLETED | OUTPATIENT
Start: 2024-04-29 | End: 2024-04-29

## 2024-04-29 RX ORDER — CYANOCOBALAMIN 1000 UG/ML
1000 INJECTION, SOLUTION INTRAMUSCULAR; SUBCUTANEOUS ONCE
Status: COMPLETED | OUTPATIENT
Start: 2024-04-29 | End: 2024-04-29

## 2024-04-29 RX ORDER — HEPARIN 100 UNIT/ML
500 SYRINGE INTRAVENOUS PRN
Status: DISCONTINUED | OUTPATIENT
Start: 2024-04-29 | End: 2024-04-30 | Stop reason: HOSPADM

## 2024-04-29 RX ORDER — SODIUM CHLORIDE 9 MG/ML
5-250 INJECTION, SOLUTION INTRAVENOUS PRN
Status: DISCONTINUED | OUTPATIENT
Start: 2024-04-29 | End: 2024-04-30 | Stop reason: HOSPADM

## 2024-04-29 RX ADMIN — HEPARIN 500 UNITS: 100 SYRINGE at 12:37

## 2024-04-29 RX ADMIN — SODIUM CHLORIDE, PRESERVATIVE FREE 20 ML: 5 INJECTION INTRAVENOUS at 09:15

## 2024-04-29 RX ADMIN — PALONOSETRON 0.25 MG: 0.05 INJECTION, SOLUTION INTRAVENOUS at 10:35

## 2024-04-29 RX ADMIN — SODIUM CHLORIDE, PRESERVATIVE FREE 20 ML: 5 INJECTION INTRAVENOUS at 12:36

## 2024-04-29 RX ADMIN — PEMETREXED DISODIUM 1000 MG: 500 INJECTION, POWDER, LYOPHILIZED, FOR SOLUTION INTRAVENOUS at 11:37

## 2024-04-29 RX ADMIN — CYANOCOBALAMIN 1000 MCG: 1000 INJECTION, SOLUTION INTRAMUSCULAR; SUBCUTANEOUS at 10:38

## 2024-04-29 RX ADMIN — CARBOPLATIN 570 MG: 10 INJECTION INTRAVENOUS at 11:56

## 2024-04-29 RX ADMIN — DEXAMETHASONE SODIUM PHOSPHATE 10 MG: 10 INJECTION INTRAMUSCULAR; INTRAVENOUS at 10:37

## 2024-04-29 RX ADMIN — SODIUM CHLORIDE 150 MG: 900 INJECTION, SOLUTION INTRAVENOUS at 10:54

## 2024-04-29 RX ADMIN — SODIUM CHLORIDE 50 ML/HR: 9 INJECTION, SOLUTION INTRAVENOUS at 09:16

## 2024-04-29 NOTE — PROGRESS NOTES
Pt arrives per amb with wife and labs and orders reviewed and pt seen per md and NS infusing before and after premeds and chemo and pt tolerated injection well no bleeding at site and no reactions or complaints and blood return present throughout infusions.  Pt discharged per amb with wife with AVS with next appts from .

## 2024-04-29 NOTE — TELEPHONE ENCOUNTER
FRANCO HERE FOR FOLLOW UP & TX   Proceed with treatment per orders, return in 3 weeks with treatment and labs.  Need PET CT scan in the next 2 weeks      PET ON: 5/14/24 @ 8:30AM   MD VISIT: 5/20/24 @ 9;30AM TX @ 9AM   AVS PRINTED AND GIVEN ON EXIT

## 2024-04-29 NOTE — PATIENT INSTRUCTIONS
Proceed with treatment per orders, return in 3 weeks with treatment and labs.  Need PET CT scan in the next 2 weeks.

## 2024-05-09 ENCOUNTER — TELEPHONE (OUTPATIENT)
Facility: HOSPITAL | Age: 64
End: 2024-05-09

## 2024-05-09 NOTE — TELEPHONE ENCOUNTER
Patient Assistance        Writer first spoke with Partha at [employer] and was transferred to Michael within claims.Michael stated the claim was not legible, but upon further review, found a clean copy of the UB04 and EOB win the [employer] portal files.     Michael submitted the claim for processing while on the phone. Payment in the amount of $4,932.51 is expected within 5-7 business days.     Writer thanked Michael.

## 2024-05-14 ENCOUNTER — HOSPITAL ENCOUNTER (OUTPATIENT)
Dept: NUCLEAR MEDICINE | Age: 64
Discharge: HOME | End: 2024-05-16
Attending: INTERNAL MEDICINE
Payer: COMMERCIAL

## 2024-05-14 DIAGNOSIS — C34.11 MALIGNANT NEOPLASM OF UPPER LOBE OF RIGHT LUNG (HCC): ICD-10-CM

## 2024-05-14 DIAGNOSIS — C77.0 METASTASIS TO CERVICAL LYMPH NODE (HCC): ICD-10-CM

## 2024-05-14 DIAGNOSIS — E03.9 ACQUIRED HYPOTHYROIDISM: ICD-10-CM

## 2024-05-14 LAB — GLUCOSE BLD-MCNC: 86 MG/DL (ref 75–110)

## 2024-05-14 PROCEDURE — A9609 HC RX DIAGNOSTIC RADIOPHARMACEUTICAL: HCPCS | Performed by: INTERNAL MEDICINE

## 2024-05-14 PROCEDURE — 82947 ASSAY GLUCOSE BLOOD QUANT: CPT

## 2024-05-14 PROCEDURE — 2580000003 HC RX 258: Performed by: INTERNAL MEDICINE

## 2024-05-14 PROCEDURE — 78815 PET IMAGE W/CT SKULL-THIGH: CPT

## 2024-05-14 PROCEDURE — 3430000000 HC RX DIAGNOSTIC RADIOPHARMACEUTICAL: Performed by: INTERNAL MEDICINE

## 2024-05-14 RX ORDER — SODIUM CHLORIDE 0.9 % (FLUSH) 0.9 %
10 SYRINGE (ML) INJECTION ONCE
Status: COMPLETED | OUTPATIENT
Start: 2024-05-14 | End: 2024-05-14

## 2024-05-14 RX ORDER — FLUDEOXYGLUCOSE F 18 200 MCI/ML
10 INJECTION, SOLUTION INTRAVENOUS
Status: COMPLETED | OUTPATIENT
Start: 2024-05-14 | End: 2024-05-14

## 2024-05-14 RX ADMIN — SODIUM CHLORIDE, PRESERVATIVE FREE 10 ML: 5 INJECTION INTRAVENOUS at 08:21

## 2024-05-14 RX ADMIN — FLUDEOXYGLUCOSE F 18 10 MILLICURIE: 200 INJECTION, SOLUTION INTRAVENOUS at 08:20

## 2024-05-16 ENCOUNTER — HOSPITAL ENCOUNTER (OUTPATIENT)
Facility: MEDICAL CENTER | Age: 64
End: 2024-05-16
Payer: COMMERCIAL

## 2024-05-20 ENCOUNTER — TELEPHONE (OUTPATIENT)
Dept: ONCOLOGY | Age: 64
End: 2024-05-20

## 2024-05-20 ENCOUNTER — OFFICE VISIT (OUTPATIENT)
Dept: ONCOLOGY | Age: 64
End: 2024-05-20
Payer: COMMERCIAL

## 2024-05-20 ENCOUNTER — HOSPITAL ENCOUNTER (OUTPATIENT)
Dept: INFUSION THERAPY | Facility: MEDICAL CENTER | Age: 64
Discharge: HOME | End: 2024-05-20
Payer: COMMERCIAL

## 2024-05-20 VITALS
DIASTOLIC BLOOD PRESSURE: 69 MMHG | OXYGEN SATURATION: 98 % | WEIGHT: 164.8 LBS | TEMPERATURE: 98.3 F | RESPIRATION RATE: 16 BRPM | SYSTOLIC BLOOD PRESSURE: 114 MMHG | HEART RATE: 76 BPM | BODY MASS INDEX: 20.6 KG/M2

## 2024-05-20 DIAGNOSIS — E03.9 ACQUIRED HYPOTHYROIDISM: ICD-10-CM

## 2024-05-20 DIAGNOSIS — C34.11 MALIGNANT NEOPLASM OF UPPER LOBE OF RIGHT LUNG (HCC): Primary | ICD-10-CM

## 2024-05-20 DIAGNOSIS — C34.90 MALIGNANT NEOPLASM OF LUNG, UNSPECIFIED LATERALITY, UNSPECIFIED PART OF LUNG (HCC): ICD-10-CM

## 2024-05-20 DIAGNOSIS — C77.1 METASTASIS TO MEDIASTINAL LYMPH NODE (HCC): ICD-10-CM

## 2024-05-20 DIAGNOSIS — C77.0 METASTASIS TO CERVICAL LYMPH NODE (HCC): ICD-10-CM

## 2024-05-20 LAB
ALBUMIN SERPL-MCNC: 3.9 G/DL (ref 3.5–5.2)
ALP SERPL-CCNC: 49 U/L (ref 40–129)
ALT SERPL-CCNC: 15 U/L (ref 5–41)
ANION GAP SERPL CALCULATED.3IONS-SCNC: 10 MMOL/L (ref 9–17)
AST SERPL-CCNC: 20 U/L
BASOPHILS # BLD: 0.04 K/UL (ref 0–0.2)
BASOPHILS NFR BLD: 1 % (ref 0–2)
BILIRUB SERPL-MCNC: 0.3 MG/DL (ref 0.3–1.2)
BUN SERPL-MCNC: 21 MG/DL (ref 8–23)
BUN/CREAT SERPL: 23 (ref 9–20)
CALCIUM SERPL-MCNC: 9 MG/DL (ref 8.6–10.4)
CHLORIDE SERPL-SCNC: 106 MMOL/L (ref 98–107)
CO2 SERPL-SCNC: 26 MMOL/L (ref 20–31)
CREAT SERPL-MCNC: 0.9 MG/DL (ref 0.7–1.2)
EOSINOPHIL # BLD: 0.07 K/UL (ref 0–0.44)
EOSINOPHILS RELATIVE PERCENT: 1 % (ref 1–4)
ERYTHROCYTE [DISTWIDTH] IN BLOOD BY AUTOMATED COUNT: 19.9 % (ref 11.8–14.4)
GFR, ESTIMATED: >90 ML/MIN/1.73M2
GLUCOSE SERPL-MCNC: 106 MG/DL (ref 70–99)
HCT VFR BLD AUTO: 31.1 % (ref 40.7–50.3)
HGB BLD-MCNC: 10.3 G/DL (ref 13–17)
IMM GRANULOCYTES # BLD AUTO: 0.01 K/UL (ref 0–0.3)
IMM GRANULOCYTES NFR BLD: 0 %
LYMPHOCYTES NFR BLD: 2.51 K/UL (ref 1.1–3.7)
LYMPHOCYTES RELATIVE PERCENT: 42 % (ref 24–43)
MCH RBC QN AUTO: 31.8 PG (ref 25.2–33.5)
MCHC RBC AUTO-ENTMCNC: 33.1 G/DL (ref 28.4–34.8)
MCV RBC AUTO: 96 FL (ref 82.6–102.9)
MONOCYTES NFR BLD: 0.81 K/UL (ref 0.1–1.2)
MONOCYTES NFR BLD: 13 % (ref 3–12)
NEUTROPHILS NFR BLD: 43 % (ref 36–65)
NEUTS SEG NFR BLD: 2.61 K/UL (ref 1.5–8.1)
NRBC BLD-RTO: 0 PER 100 WBC
PLATELET # BLD AUTO: 295 K/UL (ref 138–453)
PMV BLD AUTO: 9.7 FL (ref 8.1–13.5)
POTASSIUM SERPL-SCNC: 3.9 MMOL/L (ref 3.7–5.3)
PROT SERPL-MCNC: 6.9 G/DL (ref 6.4–8.3)
RBC # BLD AUTO: 3.24 M/UL (ref 4.21–5.77)
RBC # BLD: ABNORMAL 10*6/UL
SODIUM SERPL-SCNC: 142 MMOL/L (ref 135–144)
TSH SERPL DL<=0.05 MIU/L-ACNC: 4.45 UIU/ML (ref 0.3–5)
WBC OTHER # BLD: 6.1 K/UL (ref 3.5–11.3)

## 2024-05-20 PROCEDURE — 85025 COMPLETE CBC W/AUTO DIFF WBC: CPT

## 2024-05-20 PROCEDURE — 99214 OFFICE O/P EST MOD 30 MIN: CPT | Performed by: INTERNAL MEDICINE

## 2024-05-20 PROCEDURE — 80053 COMPREHEN METABOLIC PANEL: CPT

## 2024-05-20 PROCEDURE — 84443 ASSAY THYROID STIM HORMONE: CPT

## 2024-05-20 PROCEDURE — 36591 DRAW BLOOD OFF VENOUS DEVICE: CPT

## 2024-05-20 PROCEDURE — 96409 CHEMO IV PUSH SNGL DRUG: CPT

## 2024-05-20 PROCEDURE — 96375 TX/PRO/DX INJ NEW DRUG ADDON: CPT

## 2024-05-20 PROCEDURE — 6360000002 HC RX W HCPCS: Performed by: INTERNAL MEDICINE

## 2024-05-20 PROCEDURE — 2580000003 HC RX 258: Performed by: INTERNAL MEDICINE

## 2024-05-20 PROCEDURE — 99211 OFF/OP EST MAY X REQ PHY/QHP: CPT

## 2024-05-20 RX ORDER — DIPHENHYDRAMINE HYDROCHLORIDE 50 MG/ML
50 INJECTION INTRAMUSCULAR; INTRAVENOUS
Status: CANCELLED | OUTPATIENT
Start: 2024-05-20

## 2024-05-20 RX ORDER — SODIUM CHLORIDE 9 MG/ML
5-250 INJECTION, SOLUTION INTRAVENOUS PRN
Status: CANCELLED | OUTPATIENT
Start: 2024-05-20

## 2024-05-20 RX ORDER — SODIUM CHLORIDE 9 MG/ML
5-250 INJECTION, SOLUTION INTRAVENOUS PRN
OUTPATIENT
Start: 2024-06-10

## 2024-05-20 RX ORDER — FAMOTIDINE 10 MG/ML
20 INJECTION, SOLUTION INTRAVENOUS
OUTPATIENT
Start: 2024-06-10

## 2024-05-20 RX ORDER — FAMOTIDINE 10 MG/ML
20 INJECTION, SOLUTION INTRAVENOUS
Status: CANCELLED | OUTPATIENT
Start: 2024-05-20

## 2024-05-20 RX ORDER — SODIUM CHLORIDE 0.9 % (FLUSH) 0.9 %
5-40 SYRINGE (ML) INJECTION PRN
OUTPATIENT
Start: 2024-06-10

## 2024-05-20 RX ORDER — HEPARIN 100 UNIT/ML
500 SYRINGE INTRAVENOUS PRN
Status: DISCONTINUED | OUTPATIENT
Start: 2024-05-20 | End: 2024-05-21 | Stop reason: HOSPADM

## 2024-05-20 RX ORDER — HEPARIN SODIUM (PORCINE) LOCK FLUSH IV SOLN 100 UNIT/ML 100 UNIT/ML
500 SOLUTION INTRAVENOUS PRN
OUTPATIENT
Start: 2024-06-10

## 2024-05-20 RX ORDER — SODIUM CHLORIDE 9 MG/ML
5-250 INJECTION, SOLUTION INTRAVENOUS PRN
Status: DISCONTINUED | OUTPATIENT
Start: 2024-05-20 | End: 2024-05-21 | Stop reason: HOSPADM

## 2024-05-20 RX ORDER — MEPERIDINE HYDROCHLORIDE 50 MG/ML
12.5 INJECTION INTRAMUSCULAR; INTRAVENOUS; SUBCUTANEOUS PRN
OUTPATIENT
Start: 2024-06-10

## 2024-05-20 RX ORDER — HEPARIN SODIUM (PORCINE) LOCK FLUSH IV SOLN 100 UNIT/ML 100 UNIT/ML
500 SOLUTION INTRAVENOUS PRN
Status: CANCELLED | OUTPATIENT
Start: 2024-05-20

## 2024-05-20 RX ORDER — PROCHLORPERAZINE EDISYLATE 5 MG/ML
10 INJECTION INTRAMUSCULAR; INTRAVENOUS
Status: CANCELLED | OUTPATIENT
Start: 2024-05-20

## 2024-05-20 RX ORDER — DIPHENHYDRAMINE HYDROCHLORIDE 50 MG/ML
50 INJECTION INTRAMUSCULAR; INTRAVENOUS
OUTPATIENT
Start: 2024-06-10

## 2024-05-20 RX ORDER — SODIUM CHLORIDE 0.9 % (FLUSH) 0.9 %
5-40 SYRINGE (ML) INJECTION PRN
Status: CANCELLED | OUTPATIENT
Start: 2024-05-20

## 2024-05-20 RX ORDER — SODIUM CHLORIDE 9 MG/ML
INJECTION, SOLUTION INTRAVENOUS CONTINUOUS
Status: CANCELLED | OUTPATIENT
Start: 2024-05-20

## 2024-05-20 RX ORDER — EPINEPHRINE 1 MG/ML
0.3 INJECTION, SOLUTION, CONCENTRATE INTRAVENOUS PRN
Status: CANCELLED | OUTPATIENT
Start: 2024-05-20

## 2024-05-20 RX ORDER — MEPERIDINE HYDROCHLORIDE 50 MG/ML
12.5 INJECTION INTRAMUSCULAR; INTRAVENOUS; SUBCUTANEOUS PRN
Status: CANCELLED | OUTPATIENT
Start: 2024-05-20

## 2024-05-20 RX ORDER — LORAZEPAM 2 MG/ML
0.5 INJECTION INTRAMUSCULAR
Status: CANCELLED | OUTPATIENT
Start: 2024-05-20

## 2024-05-20 RX ORDER — EPINEPHRINE 1 MG/ML
0.3 INJECTION, SOLUTION, CONCENTRATE INTRAVENOUS PRN
OUTPATIENT
Start: 2024-06-10

## 2024-05-20 RX ORDER — ONDANSETRON 2 MG/ML
8 INJECTION INTRAMUSCULAR; INTRAVENOUS
Status: CANCELLED | OUTPATIENT
Start: 2024-05-20

## 2024-05-20 RX ORDER — SODIUM CHLORIDE 0.9 % (FLUSH) 0.9 %
5-40 SYRINGE (ML) INJECTION PRN
Status: DISCONTINUED | OUTPATIENT
Start: 2024-05-20 | End: 2024-05-21 | Stop reason: HOSPADM

## 2024-05-20 RX ORDER — DEXAMETHASONE SODIUM PHOSPHATE 10 MG/ML
8 INJECTION INTRAMUSCULAR; INTRAVENOUS ONCE
Status: COMPLETED | OUTPATIENT
Start: 2024-05-20 | End: 2024-05-20

## 2024-05-20 RX ORDER — ONDANSETRON 2 MG/ML
8 INJECTION INTRAMUSCULAR; INTRAVENOUS
OUTPATIENT
Start: 2024-06-10

## 2024-05-20 RX ORDER — ACETAMINOPHEN 325 MG/1
650 TABLET ORAL
OUTPATIENT
Start: 2024-06-10

## 2024-05-20 RX ORDER — SODIUM CHLORIDE 9 MG/ML
INJECTION, SOLUTION INTRAVENOUS CONTINUOUS
OUTPATIENT
Start: 2024-06-10

## 2024-05-20 RX ORDER — ACETAMINOPHEN 325 MG/1
650 TABLET ORAL
Status: CANCELLED | OUTPATIENT
Start: 2024-05-20

## 2024-05-20 RX ORDER — LORAZEPAM 2 MG/ML
0.5 INJECTION INTRAMUSCULAR
OUTPATIENT
Start: 2024-06-10

## 2024-05-20 RX ORDER — ALBUTEROL SULFATE 90 UG/1
4 AEROSOL, METERED RESPIRATORY (INHALATION) PRN
Status: CANCELLED | OUTPATIENT
Start: 2024-05-20

## 2024-05-20 RX ORDER — ALBUTEROL SULFATE 90 UG/1
4 AEROSOL, METERED RESPIRATORY (INHALATION) PRN
OUTPATIENT
Start: 2024-06-10

## 2024-05-20 RX ORDER — PROCHLORPERAZINE EDISYLATE 5 MG/ML
10 INJECTION INTRAMUSCULAR; INTRAVENOUS
OUTPATIENT
Start: 2024-06-10

## 2024-05-20 RX ADMIN — PEMETREXED DISODIUM 1000 MG: 500 INJECTION, POWDER, LYOPHILIZED, FOR SOLUTION INTRAVENOUS at 11:01

## 2024-05-20 RX ADMIN — SODIUM CHLORIDE 25 ML/HR: 9 INJECTION, SOLUTION INTRAVENOUS at 10:09

## 2024-05-20 RX ADMIN — HEPARIN 500 UNITS: 100 SYRINGE at 11:30

## 2024-05-20 RX ADMIN — DEXAMETHASONE SODIUM PHOSPHATE 8 MG: 10 INJECTION INTRAMUSCULAR; INTRAVENOUS at 10:11

## 2024-05-20 RX ADMIN — SODIUM CHLORIDE, PRESERVATIVE FREE 10 ML: 5 INJECTION INTRAVENOUS at 11:30

## 2024-05-20 RX ADMIN — SODIUM CHLORIDE, PRESERVATIVE FREE 10 ML: 5 INJECTION INTRAVENOUS at 09:10

## 2024-05-20 NOTE — PROGRESS NOTES
SPIRITUAL HEALTH PROGRESS NOTE: Outpatient Oncology Care at [facility]      Spiritual Assessment: Pt and Spouse were in the treatment cubicle of the infusion clinic. They were holding hands. Pt shared that he was doing well. Pt expressed gratitude for the good report they received from the doctor today. Pt and Spouse spoke of their plans to travel and the places they would like to visit. Pt affirmed that he stays positive and noted, \"You don't have a choice.\"    Intervention: Writer greeted Pt and Spouse. Writer inquired how Pt and Spouse were doing. Writer explored Pt's and Spouse's plans. Writer celebrated Pt's good report. Writer offered words of support and encouragement.      Outcome: Pt and Spouse thanked writer.      Plan: Chaplains will remain available to provide emotional and spiritual support as needed.       05/20/24 1217   Encounter Summary   Encounter Overview/Reason Follow-up   Service Provided For Patient and family together   Referral/Consult From [facility]   Support System Family members;Spouse   Last Encounter  01/29/24   Complexity of Encounter Low   Begin Time 1045   End Time  1055   Total Time Calculated 10 min   Spiritual/Emotional needs   Type Spiritual Support   Assessment/Intervention/Outcome   Assessment Calm;Coping   Intervention Active listening;Discussed meaning/purpose;Discussed illness injury and it’s impact;Explored/Affirmed feelings, thoughts, concerns;Explored Coping Skills/Resources;Sustaining Presence/Ministry of presence   Outcome Encouraged;Coping;Engaged in conversation;Expressed feelings, needs, and concerns;Expressed Gratitude   Plan and Referrals   Plan/Referrals Continue Support (comment)       Electronically signed by Vickie Dumont, Oncology Outpatient   Spiritual Health Department  [phone]  5/20/2024  12:18 PM

## 2024-05-20 NOTE — PROGRESS NOTES
Jony arrives ambulatory with spouse for C5 D1 tx & MD visit  Denies new complaints. Has continued concern about tumor to right neck  Labs drawn per port & reviewed  Pt seen by Dr. Guzmán & orders to tx  Pt premedicated  Alimta infused without incident  Line flushed  Pt stable for discharge  Port flushed & heparinized with intact Odell needle removed per protocol  Pt discharged ambulatory with family  AVS per \  Returns 6/10 for C6 D1 Alimta & MD visit

## 2024-05-20 NOTE — TELEPHONE ENCOUNTER
Name: Jony Portillo  : 1960  MRN: [id number]    Oncology Navigation Follow-Up Note    Contact Type:  Medical Oncology    Notes:   Navigator met with pt. Face to face and offering assistance if needed. Pt. Waiting on labs . No barriers to care noted.       Electronically signed by Erica Martin RN on 2024 at 10:23 AM

## 2024-05-20 NOTE — TELEPHONE ENCOUNTER
FRANCO HERE FOR FOLLOW UP   Proceed with Alimta oper orders. Rv in 3 weeks with chemo and labs  Refer to rad onc   LABS ORDERED: CBC CMP   RAD ON: 5/23/24 @ 1PM   MD VISIT: 6/10/24 @ 11:30AM TX @ 11AM   AVS PRINTED AND GIVEN ON EXIT

## 2024-05-20 NOTE — PROGRESS NOTES
04/29/2024 0912    K 3.8 04/29/2024 0912     04/29/2024 0912    CO2 25 04/29/2024 0912    BUN 21 04/29/2024 0912    CREATININE 0.9 04/29/2024 0912        Component Value Date/Time    CALCIUM 8.6 04/29/2024 0912    ALKPHOS 46 04/29/2024 0912    AST 15 04/29/2024 0912    ALT 11 04/29/2024 0912    BILITOT 0.1 (L) 04/29/2024 0912        Lab Results   Component Value Date    TSH 6.40 (H) 04/29/2024       Pathology  Path Number: [id number]     -- Diagnosis --   RIGHT CERVICAL LYMPH NODE, NEEDLE CORE BIOPSY:   -METASTATIC LUNG ADENOCARCINOMA.   REVIEW OF RADIOLOGICAL RESULTS:   PET CT scan after 4 cycles  IMPRESSION:  1.  Resolution of many of the cervical and mediastinal and hilar lymph nodes  since the PET-CT scan 08/11/2023; however, there are new right axillary and  right cervical metabolically active lymph nodes and intense residual activity  in several additional cervical lymph nodes concerning for disease progression.     2.  New focal activity in the subcutaneous fat in the left scapular region  which could be metastatic disease or focal infectious or inflammatory change.  CT scan after 7 cycles   IMPRESSION:  1. Interval increase in size and number of the right axillary lymph nodes  compared to PET-CT 12/11/2023.  The largest lymph node now measures roughly  1.6 cm short axis and was not present on prior PET-CT.  These are concerning  for infectious, inflammatory or neoplastic process.  2. Emphysematous changes.  No suspicious pulmonary mass or nodule.  IMPRESSION:   Clinical diagnosis of metastatic lung cancer, clinical stage is T2 N2 M1  No evidence of metastatic disease outside of the mediastinum and cervical lymph nodes  Significant dysphagia and change in voice secondary to the tumor in the neck  Plan systemic chemotherapy with carboplatin, Alimta and Keytruda  Molecular testing is negative for targetable mutation  He had transient hyperthyroidism followed by hypothyroidism.  Started on

## 2024-05-23 ENCOUNTER — HOSPITAL ENCOUNTER (OUTPATIENT)
Dept: RADIATION ONCOLOGY | Age: 64
Discharge: HOME | End: 2024-05-23
Payer: COMMERCIAL

## 2024-05-23 VITALS
SYSTOLIC BLOOD PRESSURE: 114 MMHG | RESPIRATION RATE: 16 BRPM | BODY MASS INDEX: 20.75 KG/M2 | DIASTOLIC BLOOD PRESSURE: 69 MMHG | HEART RATE: 76 BPM | OXYGEN SATURATION: 98 % | WEIGHT: 166 LBS | TEMPERATURE: 97.8 F

## 2024-05-23 PROCEDURE — 99213 OFFICE O/P EST LOW 20 MIN: CPT | Performed by: RADIOLOGY

## 2024-05-23 NOTE — PROGRESS NOTES
Referring Physician: Dr. Johnson      Vitals:    05/23/24 1300   BP: 114/69   Pulse: 76   Resp: 16   Temp: 97.8 °F (36.6 °C)   SpO2: 98%    :     Pain Assessment: None - Denies Pain          Wt Readings from Last 1 Encounters:   05/23/24 75.3 kg (166 lb)        Body mass index is 20.75 kg/m².              Smoking Status:    [x] Smoker - PPD:  smokes 3 cigarettes/day   [] Nonsmoker - Quit Date:               [] Never a smoker      No chief complaint on file.       Cancer Staging   Malignant neoplasm of upper lobe of right lung (HCC)  Staging form: Lung, AJCC 8th Edition  - Clinical: Stage ANUP (cT2, cN2, cM1b) - Signed by Mamie Guzmán MD on 9/11/2023      Prior Radiation Therapy? No   If yes, site treated:   Facility:                             Date:    Concurrent Chemo/radiation? No- receiving maintenance Alimta every three weeks   If yes, start date:    Prior Hormonal Therapy or Contraceptive Medications?  No   If yes,  Medication:                                Duration:    Head and Neck Cancer? No     Pacemaker/Defibrillator/ICD:  No    Do you have any musculoskeletal diseases, Lupus or arthritic conditions?  No   If yes, are you currently taking Methotrexate?  []  Yes  [x]  No                   Current Outpatient Medications   Medication Sig Dispense Refill    dexAMETHasone (DECADRON) 2 MG tablet TAKE 1 TABLET BY MOUTH DAILY 30 tablet 0    buPROPion (WELLBUTRIN XL) 150 MG extended release tablet Take 1 tablet by mouth every morning 30 tablet 3    levothyroxine (SYNTHROID) 75 MCG tablet Take 1 tablet by mouth daily 30 tablet 5    folic acid (FOLVITE) 1 MG tablet Take 1 tablet by mouth daily 90 tablet 1    methIMAzole (TAPAZOLE) 5 MG tablet TAKE 1 TABLET BY MOUTH DAILY 30 tablet 1    omeprazole (PRILOSEC OTC) 20 MG tablet Take 1 tablet by mouth daily (Patient not taking: Reported on 3/18/2024) 30 tablet 3    ondansetron (ZOFRAN) 8 MG tablet Take 1 tablet by mouth every 8 hours as needed for Nausea or

## 2024-06-05 ENCOUNTER — HOSPITAL ENCOUNTER (OUTPATIENT)
Dept: RADIATION ONCOLOGY | Age: 64
Discharge: HOME | End: 2024-06-05
Payer: COMMERCIAL

## 2024-06-05 PROCEDURE — 77334 RADIATION TREATMENT AID(S): CPT | Performed by: RADIOLOGY

## 2024-06-05 RX ORDER — BETAMETHASONE DIPROPIONATE 0.5 MG/G
CREAM TOPICAL
Qty: 45 G | Refills: 2 | Status: SHIPPED | OUTPATIENT
Start: 2024-06-05

## 2024-06-05 NOTE — FLOWSHEET NOTE
Post IV contrast, left chest post flushed with 20ccNS followed by 500 IU Heparin in 5cc volume.  Needle intact when discontinued.  Bandaid applied to site.  Patient tolerated well.

## 2024-06-05 NOTE — PROGRESS NOTES
Radiation Treatment Site/Plan/Fractions:20 daily, right neck nodes, possibly left neck nodes      Concurrent Chemotherapy/Immunotherapy:None- receives Alimta q3 weeks at [place], next dose 6/10/24      Cardiac Device:No      Transportation Concerns:No      Nursing Referrals and Reasons:None      Contrast Given:Yes          Miscellaneous Information:He arrives ambulatory with steady gait.  He is accompanied by his spouse.  Treatment plan as well as desired and untoward effects reviewed. Information provided written and verbally.  Questions answered t their satisfaction and he voices understanding of the information.  Spouse questioning treatment of left neck node.  Dr. Gore addressed spouse question stating that we are going to submit plan covering as much of the left side as safely possible and also depends on insurance approval.  She voices understanding. Prescription for Memantone cream sent to patient  Left chest port accessed per hospital policy/procedure.  Prescription for Betamethasone cream sent to patient pharmacy. Patient escorted to simulation room.

## 2024-06-10 ENCOUNTER — HOSPITAL ENCOUNTER (OUTPATIENT)
Dept: INFUSION THERAPY | Facility: MEDICAL CENTER | Age: 64
Discharge: HOME | End: 2024-06-10
Payer: COMMERCIAL

## 2024-06-10 ENCOUNTER — TELEPHONE (OUTPATIENT)
Dept: ONCOLOGY | Age: 64
End: 2024-06-10

## 2024-06-10 ENCOUNTER — OFFICE VISIT (OUTPATIENT)
Dept: ONCOLOGY | Age: 64
End: 2024-06-10
Payer: COMMERCIAL

## 2024-06-10 VITALS
HEART RATE: 76 BPM | BODY MASS INDEX: 21.17 KG/M2 | TEMPERATURE: 98 F | WEIGHT: 169.4 LBS | RESPIRATION RATE: 18 BRPM | SYSTOLIC BLOOD PRESSURE: 120 MMHG | DIASTOLIC BLOOD PRESSURE: 70 MMHG

## 2024-06-10 DIAGNOSIS — C77.0 METASTASIS TO CERVICAL LYMPH NODE (HCC): ICD-10-CM

## 2024-06-10 DIAGNOSIS — E03.9 ACQUIRED HYPOTHYROIDISM: ICD-10-CM

## 2024-06-10 DIAGNOSIS — C34.11 MALIGNANT NEOPLASM OF UPPER LOBE OF RIGHT LUNG (HCC): Primary | ICD-10-CM

## 2024-06-10 DIAGNOSIS — C34.90 MALIGNANT NEOPLASM OF LUNG, UNSPECIFIED LATERALITY, UNSPECIFIED PART OF LUNG (HCC): ICD-10-CM

## 2024-06-10 DIAGNOSIS — C34.11 MALIGNANT NEOPLASM OF UPPER LOBE OF RIGHT LUNG (HCC): ICD-10-CM

## 2024-06-10 DIAGNOSIS — C77.1 METASTASIS TO MEDIASTINAL LYMPH NODE (HCC): ICD-10-CM

## 2024-06-10 LAB
ALBUMIN SERPL-MCNC: 3.7 G/DL (ref 3.5–5.2)
ALP SERPL-CCNC: 57 U/L (ref 40–129)
ALT SERPL-CCNC: 18 U/L (ref 5–41)
ANION GAP SERPL CALCULATED.3IONS-SCNC: 13 MMOL/L (ref 9–17)
AST SERPL-CCNC: 18 U/L
BASOPHILS # BLD: 0.04 K/UL (ref 0–0.2)
BASOPHILS NFR BLD: 0 % (ref 0–2)
BILIRUB SERPL-MCNC: 0.3 MG/DL (ref 0.3–1.2)
BUN SERPL-MCNC: 25 MG/DL (ref 8–23)
BUN/CREAT SERPL: 28 (ref 9–20)
CALCIUM SERPL-MCNC: 8.6 MG/DL (ref 8.6–10.4)
CHLORIDE SERPL-SCNC: 103 MMOL/L (ref 98–107)
CO2 SERPL-SCNC: 22 MMOL/L (ref 20–31)
CREAT SERPL-MCNC: 0.9 MG/DL (ref 0.7–1.2)
EOSINOPHIL # BLD: 0.04 K/UL (ref 0–0.44)
EOSINOPHILS RELATIVE PERCENT: 0 % (ref 1–4)
ERYTHROCYTE [DISTWIDTH] IN BLOOD BY AUTOMATED COUNT: 20 % (ref 11.8–14.4)
GFR, ESTIMATED: >90 ML/MIN/1.73M2
GLUCOSE SERPL-MCNC: 135 MG/DL (ref 70–99)
HCT VFR BLD AUTO: 31.3 % (ref 40.7–50.3)
HGB BLD-MCNC: 10.2 G/DL (ref 13–17)
IMM GRANULOCYTES # BLD AUTO: 0.1 K/UL (ref 0–0.3)
IMM GRANULOCYTES NFR BLD: 1 %
LYMPHOCYTES NFR BLD: 1.63 K/UL (ref 1.1–3.7)
LYMPHOCYTES RELATIVE PERCENT: 12 % (ref 24–43)
MCH RBC QN AUTO: 32.4 PG (ref 25.2–33.5)
MCHC RBC AUTO-ENTMCNC: 32.6 G/DL (ref 28.4–34.8)
MCV RBC AUTO: 99.4 FL (ref 82.6–102.9)
MONOCYTES NFR BLD: 0.92 K/UL (ref 0.1–1.2)
MONOCYTES NFR BLD: 7 % (ref 3–12)
NEUTROPHILS NFR BLD: 80 % (ref 36–65)
NEUTS SEG NFR BLD: 11.44 K/UL (ref 1.5–8.1)
NRBC BLD-RTO: 0 PER 100 WBC
PLATELET # BLD AUTO: 361 K/UL (ref 138–453)
PMV BLD AUTO: 9.3 FL (ref 8.1–13.5)
POTASSIUM SERPL-SCNC: 4.2 MMOL/L (ref 3.7–5.3)
PROT SERPL-MCNC: 6.8 G/DL (ref 6.4–8.3)
RBC # BLD AUTO: 3.15 M/UL (ref 4.21–5.77)
RBC # BLD: ABNORMAL 10*6/UL
SODIUM SERPL-SCNC: 138 MMOL/L (ref 135–144)
TSH SERPL DL<=0.05 MIU/L-ACNC: 2.87 UIU/ML (ref 0.3–5)
WBC OTHER # BLD: 14.2 K/UL (ref 3.5–11.3)

## 2024-06-10 PROCEDURE — 99214 OFFICE O/P EST MOD 30 MIN: CPT | Performed by: INTERNAL MEDICINE

## 2024-06-10 PROCEDURE — 85025 COMPLETE CBC W/AUTO DIFF WBC: CPT

## 2024-06-10 PROCEDURE — 6360000002 HC RX W HCPCS: Performed by: INTERNAL MEDICINE

## 2024-06-10 PROCEDURE — 99211 OFF/OP EST MAY X REQ PHY/QHP: CPT | Performed by: INTERNAL MEDICINE

## 2024-06-10 PROCEDURE — 36591 DRAW BLOOD OFF VENOUS DEVICE: CPT

## 2024-06-10 PROCEDURE — 96413 CHEMO IV INFUSION 1 HR: CPT

## 2024-06-10 PROCEDURE — 2580000003 HC RX 258: Performed by: INTERNAL MEDICINE

## 2024-06-10 PROCEDURE — 84443 ASSAY THYROID STIM HORMONE: CPT

## 2024-06-10 PROCEDURE — 96376 TX/PRO/DX INJ SAME DRUG ADON: CPT

## 2024-06-10 PROCEDURE — 80053 COMPREHEN METABOLIC PANEL: CPT

## 2024-06-10 RX ORDER — DEXAMETHASONE SODIUM PHOSPHATE 10 MG/ML
8 INJECTION INTRAMUSCULAR; INTRAVENOUS ONCE
Status: COMPLETED | OUTPATIENT
Start: 2024-06-10 | End: 2024-06-10

## 2024-06-10 RX ORDER — SODIUM CHLORIDE 9 MG/ML
5-250 INJECTION, SOLUTION INTRAVENOUS PRN
Status: DISCONTINUED | OUTPATIENT
Start: 2024-06-10 | End: 2024-06-11 | Stop reason: HOSPADM

## 2024-06-10 RX ORDER — DEXAMETHASONE 2 MG/1
TABLET ORAL
Qty: 30 TABLET | Refills: 0 | Status: SHIPPED | OUTPATIENT
Start: 2024-06-10

## 2024-06-10 RX ORDER — HEPARIN 100 UNIT/ML
500 SYRINGE INTRAVENOUS PRN
Status: DISCONTINUED | OUTPATIENT
Start: 2024-06-10 | End: 2024-06-11 | Stop reason: HOSPADM

## 2024-06-10 RX ORDER — LEVOTHYROXINE SODIUM 0.07 MG/1
75 TABLET ORAL DAILY
Qty: 30 TABLET | Refills: 5 | Status: SHIPPED | OUTPATIENT
Start: 2024-06-10

## 2024-06-10 RX ORDER — SODIUM CHLORIDE 0.9 % (FLUSH) 0.9 %
5-40 SYRINGE (ML) INJECTION PRN
Status: DISCONTINUED | OUTPATIENT
Start: 2024-06-10 | End: 2024-06-11 | Stop reason: HOSPADM

## 2024-06-10 RX ADMIN — DEXAMETHASONE SODIUM PHOSPHATE 8 MG: 10 INJECTION INTRAMUSCULAR; INTRAVENOUS at 12:04

## 2024-06-10 RX ADMIN — HEPARIN 500 UNITS: 100 SYRINGE at 13:00

## 2024-06-10 RX ADMIN — SODIUM CHLORIDE 25 ML/HR: 9 INJECTION, SOLUTION INTRAVENOUS at 12:04

## 2024-06-10 RX ADMIN — SODIUM CHLORIDE 25 ML/HR: 9 INJECTION, SOLUTION INTRAVENOUS at 12:38

## 2024-06-10 RX ADMIN — SODIUM CHLORIDE, PRESERVATIVE FREE 10 ML: 5 INJECTION INTRAVENOUS at 13:00

## 2024-06-10 RX ADMIN — SODIUM CHLORIDE, PRESERVATIVE FREE 10 ML: 5 INJECTION INTRAVENOUS at 11:10

## 2024-06-10 RX ADMIN — PEMETREXED DISODIUM 1000 MG: 500 INJECTION, POWDER, LYOPHILIZED, FOR SOLUTION INTRAVENOUS at 12:39

## 2024-06-10 NOTE — TELEPHONE ENCOUNTER
FRANCO HERE FOR FOLLOW UP & TX  Continue chemo per orders, rv in 3 weeks with chemo and labs  MD VISIT: 7/1/24 @ 9:30AM TX @ 9AM   AVS PRINTED AND GIVEN ON EXIT

## 2024-06-10 NOTE — PROGRESS NOTES
Jony arrives ambulatory with spouse for C6 D1 tx & MD visit  Denies new complaints. Received approval from insurance to begin radiation tx  Labs drawn per port & reviewed  Pt seen by Dr. Guzmán & orders to tx  Pt premedicated  Alimta infused without incident  Line flushed  Pt stable for discharge  Port flushed & heparinized with intact Odell needle removed per protocol  Pt discharged ambulatory with spouse  AVS per \  Returns 7/1 for C7 D1 Alimta & MD visit

## 2024-06-10 NOTE — PROGRESS NOTES
DIAGNOSIS:   Multiple cervical adenopathy  Biopsy showed adenocarcinoma suggestive of lung primary.  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  Good response to 4 cycles of combination therapy, plan to stop carboplatin and continue with Alimta maintenance starting May/2024  BRIEF CASE HISTORY:   Jony Portillo is a very pleasant 63 y.o. male who is referred to us for recently diagnosed adenocarcinoma of possible lung primary.  He presented with unprovoked DVT in the right lower extremity.  Because of the nature of his unprovoked DVT and his symptoms of neck swelling, he underwent a CT scan of the chest that showed significant mediastinal adenopathy.  He is sent to us for a consultation, he is having difficulty sleeping and change in voice.  Most of this difficulty in sleeping is secondary to swelling in the neck.  He does not have any chest pain or shortness of breath and he does not have any hemoptysis.  No change in weight.  Tolerating anticoagulation very well.  Confirmation of the biopsy showed that this is adenocarcinoma of lung primary.  PET CT scan showed right hilar mass with mediastinal and cervical adenopathy.  Molecular testing showed no targetable mutation, we decided to treat him with carboplatin, Alimta and Keytruda  After 4 cycles, the PET CT scan showed resolution of the known cervical and mediastinal hilar lymph node.  However, there was some intense activity and some lymph node that was deemed to be reactive.  Overall, it was felt that he is

## 2024-06-19 ENCOUNTER — HOSPITAL ENCOUNTER (OUTPATIENT)
Dept: RADIATION ONCOLOGY | Age: 64
Discharge: HOME | End: 2024-06-19
Payer: COMMERCIAL

## 2024-06-19 PROCEDURE — 77300 RADIATION THERAPY DOSE PLAN: CPT | Performed by: RADIOLOGY

## 2024-06-19 PROCEDURE — 77301 RADIOTHERAPY DOSE PLAN IMRT: CPT | Performed by: RADIOLOGY

## 2024-06-19 PROCEDURE — 77338 DESIGN MLC DEVICE FOR IMRT: CPT | Performed by: RADIOLOGY

## 2024-06-24 PROCEDURE — 77336 RADIATION PHYSICS CONSULT: CPT | Performed by: RADIOLOGY

## 2024-06-30 ENCOUNTER — HOSPITAL ENCOUNTER (OUTPATIENT)
Dept: RADIATION ONCOLOGY | Age: 64
Discharge: HOME | End: 2024-06-30
Payer: COMMERCIAL

## 2024-07-01 ENCOUNTER — HOSPITAL ENCOUNTER (OUTPATIENT)
Dept: INFUSION THERAPY | Facility: MEDICAL CENTER | Age: 64
Discharge: HOME | End: 2024-07-01
Payer: COMMERCIAL

## 2024-07-01 ENCOUNTER — OFFICE VISIT (OUTPATIENT)
Dept: ONCOLOGY | Age: 64
End: 2024-07-01
Payer: COMMERCIAL

## 2024-07-01 ENCOUNTER — TELEPHONE (OUTPATIENT)
Dept: ONCOLOGY | Age: 64
End: 2024-07-01

## 2024-07-01 ENCOUNTER — HOSPITAL ENCOUNTER (OUTPATIENT)
Facility: MEDICAL CENTER | Age: 64
End: 2024-07-01
Payer: COMMERCIAL

## 2024-07-01 ENCOUNTER — HOSPITAL ENCOUNTER (OUTPATIENT)
Dept: RADIATION ONCOLOGY | Age: 64
Discharge: HOME | End: 2024-07-01
Payer: COMMERCIAL

## 2024-07-01 VITALS
DIASTOLIC BLOOD PRESSURE: 67 MMHG | BODY MASS INDEX: 22.24 KG/M2 | TEMPERATURE: 98.2 F | HEART RATE: 71 BPM | SYSTOLIC BLOOD PRESSURE: 112 MMHG | WEIGHT: 177.9 LBS | RESPIRATION RATE: 16 BRPM

## 2024-07-01 DIAGNOSIS — C34.11 MALIGNANT NEOPLASM OF UPPER LOBE OF RIGHT LUNG (HCC): ICD-10-CM

## 2024-07-01 DIAGNOSIS — E03.9 ACQUIRED HYPOTHYROIDISM: ICD-10-CM

## 2024-07-01 DIAGNOSIS — C77.0 METASTASIS TO CERVICAL LYMPH NODE (HCC): ICD-10-CM

## 2024-07-01 DIAGNOSIS — Z72.0 TOBACCO ABUSE: ICD-10-CM

## 2024-07-01 LAB
ALBUMIN SERPL-MCNC: 3.9 G/DL (ref 3.5–5.2)
ALP SERPL-CCNC: 54 U/L (ref 40–129)
ALT SERPL-CCNC: 19 U/L (ref 5–41)
ANION GAP SERPL CALCULATED.3IONS-SCNC: 10 MMOL/L (ref 9–17)
AST SERPL-CCNC: 20 U/L
BASOPHILS # BLD: 0.05 K/UL (ref 0–0.2)
BASOPHILS NFR BLD: 0 % (ref 0–2)
BILIRUB SERPL-MCNC: 0.2 MG/DL (ref 0.3–1.2)
BUN SERPL-MCNC: 24 MG/DL (ref 8–23)
BUN/CREAT SERPL: 27 (ref 9–20)
CALCIUM SERPL-MCNC: 8.8 MG/DL (ref 8.6–10.4)
CHLORIDE SERPL-SCNC: 103 MMOL/L (ref 98–107)
CO2 SERPL-SCNC: 26 MMOL/L (ref 20–31)
CREAT SERPL-MCNC: 0.9 MG/DL (ref 0.7–1.2)
EOSINOPHIL # BLD: 0.06 K/UL (ref 0–0.44)
EOSINOPHILS RELATIVE PERCENT: 1 % (ref 1–4)
ERYTHROCYTE [DISTWIDTH] IN BLOOD BY AUTOMATED COUNT: 17.4 % (ref 11.8–14.4)
GFR, ESTIMATED: >90 ML/MIN/1.73M2
GLUCOSE SERPL-MCNC: 98 MG/DL (ref 70–99)
HCT VFR BLD AUTO: 30.7 % (ref 40.7–50.3)
HGB BLD-MCNC: 9.8 G/DL (ref 13–17)
IMM GRANULOCYTES # BLD AUTO: 0.06 K/UL (ref 0–0.3)
IMM GRANULOCYTES NFR BLD: 1 %
LYMPHOCYTES NFR BLD: 1.19 K/UL (ref 1.1–3.7)
LYMPHOCYTES RELATIVE PERCENT: 10 % (ref 24–43)
MCH RBC QN AUTO: 33.4 PG (ref 25.2–33.5)
MCHC RBC AUTO-ENTMCNC: 31.9 G/DL (ref 28.4–34.8)
MCV RBC AUTO: 104.8 FL (ref 82.6–102.9)
MONOCYTES NFR BLD: 1 K/UL (ref 0.1–1.2)
MONOCYTES NFR BLD: 8 % (ref 3–12)
NEUTROPHILS NFR BLD: 80 % (ref 36–65)
NEUTS SEG NFR BLD: 9.57 K/UL (ref 1.5–8.1)
NRBC BLD-RTO: 0 PER 100 WBC
PLATELET # BLD AUTO: 325 K/UL (ref 138–453)
PMV BLD AUTO: 9.4 FL (ref 8.1–13.5)
POTASSIUM SERPL-SCNC: 4.6 MMOL/L (ref 3.7–5.3)
PROT SERPL-MCNC: 6.6 G/DL (ref 6.4–8.3)
RBC # BLD AUTO: 2.93 M/UL (ref 4.21–5.77)
RBC # BLD: ABNORMAL 10*6/UL
RBC # BLD: ABNORMAL 10*6/UL
SODIUM SERPL-SCNC: 139 MMOL/L (ref 135–144)
TSH SERPL DL<=0.05 MIU/L-ACNC: 2.3 UIU/ML (ref 0.3–5)
WBC OTHER # BLD: 11.9 K/UL (ref 3.5–11.3)

## 2024-07-01 PROCEDURE — 99211 OFF/OP EST MAY X REQ PHY/QHP: CPT | Performed by: INTERNAL MEDICINE

## 2024-07-01 PROCEDURE — 6360000002 HC RX W HCPCS: Performed by: INTERNAL MEDICINE

## 2024-07-01 PROCEDURE — 36591 DRAW BLOOD OFF VENOUS DEVICE: CPT

## 2024-07-01 PROCEDURE — 77386 HC NTSTY MODUL RAD TX DLVR CPLX: CPT | Performed by: RADIOLOGY

## 2024-07-01 PROCEDURE — 85025 COMPLETE CBC W/AUTO DIFF WBC: CPT

## 2024-07-01 PROCEDURE — 84443 ASSAY THYROID STIM HORMONE: CPT

## 2024-07-01 PROCEDURE — 2580000003 HC RX 258: Performed by: INTERNAL MEDICINE

## 2024-07-01 PROCEDURE — 80053 COMPREHEN METABOLIC PANEL: CPT

## 2024-07-01 PROCEDURE — 99214 OFFICE O/P EST MOD 30 MIN: CPT | Performed by: INTERNAL MEDICINE

## 2024-07-01 RX ORDER — BUPROPION HYDROCHLORIDE 150 MG/1
150 TABLET ORAL EVERY MORNING
Qty: 30 TABLET | Refills: 3 | Status: SHIPPED | OUTPATIENT
Start: 2024-07-01

## 2024-07-01 RX ORDER — DEXAMETHASONE 2 MG/1
TABLET ORAL
Qty: 30 TABLET | Refills: 0 | Status: SHIPPED | OUTPATIENT
Start: 2024-07-01

## 2024-07-01 RX ORDER — HEPARIN 100 UNIT/ML
500 SYRINGE INTRAVENOUS PRN
Status: ACTIVE | OUTPATIENT
Start: 2024-07-01 | End: 2024-07-01

## 2024-07-01 RX ORDER — SODIUM CHLORIDE 0.9 % (FLUSH) 0.9 %
5-40 SYRINGE (ML) INJECTION EVERY 12 HOURS
Status: COMPLETED | OUTPATIENT
Start: 2024-07-01 | End: 2024-07-01

## 2024-07-01 RX ORDER — FOLIC ACID 1 MG/1
1 TABLET ORAL DAILY
Qty: 90 TABLET | Refills: 1 | Status: SHIPPED | OUTPATIENT
Start: 2024-07-01

## 2024-07-01 RX ADMIN — HEPARIN 500 UNITS: 100 SYRINGE at 10:37

## 2024-07-01 RX ADMIN — SODIUM CHLORIDE, PRESERVATIVE FREE 10 ML: 5 INJECTION INTRAVENOUS at 10:36

## 2024-07-01 NOTE — TELEPHONE ENCOUNTER
FRANCO HERE FOR FOLLOW UP & TX   Hold chemo for now  Rv in 4 weeks(delay one week) with chemo and labs cbc, cmp TSH   TX HELD TODAY   MD VISIT: 7/29/24 @ 11;30AM TX @ 11AM   LABS UPDATED TO TX   AVS PRINTED AND GIVEN ON EXIT

## 2024-07-01 NOTE — PROGRESS NOTES
Spiritual Health Outpatient Oncology/Hematology Progress Note    Situation: Writer encountered Patient in the treatment cubicle of the infusion clinic.    Assessment: Patient smiled and greeted writer. Pt shared how he was doing. Pt noted that his spouse was with his grandson who was having surgery. Pt affirmed that his grandson will be okay, noting that his Spouse will take care of him.    Intervention: Writer greeted Pt and inquired how he was doing. Writer offered words of support and encouragement.     Outcome: Patient thanked writer.    Plan: Spiritual Health Services are available for Patient by phone and/or in person.    07/01/24 1128   Encounter Summary   Encounter Overview/Reason Follow-up   Service Provided For Patient   Referral/Consult From [facility] Children;Family members;Spouse   Last Encounter  05/20/24   Complexity of Encounter Low   Begin Time 1030   End Time  1033   Total Time Calculated 3 min   Spiritual/Emotional needs   Type Spiritual Support   Assessment/Intervention/Outcome   Assessment Coping;Calm   Intervention Sustaining Presence/Ministry of presence;Active listening;Explored/Affirmed feelings, thoughts, concerns   Outcome Engaged in conversation;Expressed Gratitude   Plan and Referrals   Plan/Referrals Continue Support (comment)       Vickie Dumont [facility] Spiritual Health   [phone]

## 2024-07-01 NOTE — TELEPHONE ENCOUNTER
Name: Jony Portillo  : 1960  MRN: [id number]    Oncology Navigation Follow-Up Note    Contact Type:  Medical Oncology    Notes:   Navigator spoke with Iraida VARGAS and pt. Had no needs today. Tx being held during RTX and next F/U .      Electronically signed by Erica Martin RN on 2024 at 11:05 AM

## 2024-07-01 NOTE — PROGRESS NOTES
Pt arrives for cycle 7 day 1 treatment and MD visit .   Denies any concerns or complaints   Port accessed; specimen sent.   Labs reviewed .  MD met with pt , will hold chemo for now due to low Hgb and radiation therapy .   MD will see pt 7/29/24   Port flushed and heparinized with intact whitten needle removed per protocol.  Pt discharged.

## 2024-07-02 ENCOUNTER — HOSPITAL ENCOUNTER (OUTPATIENT)
Dept: RADIATION ONCOLOGY | Age: 64
Discharge: HOME | End: 2024-07-02
Payer: COMMERCIAL

## 2024-07-02 PROCEDURE — 77386 HC NTSTY MODUL RAD TX DLVR CPLX: CPT | Performed by: RADIOLOGY

## 2024-07-03 ENCOUNTER — HOSPITAL ENCOUNTER (OUTPATIENT)
Dept: RADIATION ONCOLOGY | Age: 64
Discharge: HOME | End: 2024-07-03
Payer: COMMERCIAL

## 2024-07-03 VITALS
WEIGHT: 175.2 LBS | BODY MASS INDEX: 21.9 KG/M2 | SYSTOLIC BLOOD PRESSURE: 105 MMHG | DIASTOLIC BLOOD PRESSURE: 67 MMHG | RESPIRATION RATE: 16 BRPM | HEART RATE: 72 BPM | TEMPERATURE: 97.5 F

## 2024-07-03 PROCEDURE — 77386 HC NTSTY MODUL RAD TX DLVR CPLX: CPT | Performed by: RADIOLOGY

## 2024-07-03 PROCEDURE — 77336 RADIATION PHYSICS CONSULT: CPT | Performed by: RADIOLOGY

## 2024-07-03 ASSESSMENT — PAIN SCALES - GENERAL: PAINLEVEL_OUTOF10: 0

## 2024-07-03 NOTE — PROGRESS NOTES
[facility]       Radiation Oncology          [address] [place] [address]        O: [phone]        F: [phone]       [facility]             RADIATION ONCOLOGY WEEKLY PROGRESS NOTE  Patient ID:   Jony Portillo  : 1960   MRN: [id number]    Location:  [facility] Radiation Oncology,   [address] [place] [address]   [phone]    DIAGNOSIS:  Cancer Staging   Malignant neoplasm of upper lobe of right lung (HCC)  Staging form: Lung, AJCC 8th Edition  - Clinical: Stage ANUP (cT2, cN2, cM1b) - Signed by Mamie Guzmán MD on 2023      TREATMENT DETAILS:  Treatment Site: B/L Neck Axilla LNs  Actual Dose: 1000cGy  Total Planned Dose: 5000cGy  Treatment Technique: IMRT  Fraction Technique: Daily  Therapy imaging monitoring: CBCT daily  Concurrent Systemic Therapy: NA    SUBJECTIVE:   Patient seen for their weekly on treatment evaluation today.  Patient is doing well denying any complaints.  He notes a little hoarseness in his voice.  He does note the swelling on his neck is going down.  He denies any changes in appetite.    OBJECTIVE:   CHAPERONE: Family/friend/companieon Present    ECO Asymptomatic    VITAL SIGNS: /67   Pulse 72   Temp 97.5 °F (36.4 °C) (Temporal)   Resp 16   Wt 79.5 kg (175 lb 3.2 oz)   BMI 21.90 kg/m²   Wt Readings from Last 5 Encounters:   24 79.5 kg (175 lb 3.2 oz)   24 80.7 kg (177 lb 14.4 oz)   06/10/24 76.8 kg (169 lb 6.4 oz)   24 75.3 kg (166 lb)   24 74.8 kg (164 lb 12.8 oz)     GENERAL:  General appearance is that of a well-nourished, well-developed in no apparent distress.  HEENT: (+) R Neck lymphadenopathy (+) edema.  HEART:  Normal rate and regular rhythm, normal heart sounds.   LUNGS:  Pulmonary effort normal. Normal breath sounds.   EXTREMITIES:  No edema.  No calf tenderness.  MSK:  No spinal tenderness. Normal ROM.  NEUROLOGICAL: Alert and

## 2024-07-03 NOTE — PROGRESS NOTES
Jony Portillo  7/3/2024  Wt Readings from Last 3 Encounters:   07/03/24 79.5 kg (175 lb 3.2 oz)   07/01/24 80.7 kg (177 lb 14.4 oz)   06/10/24 76.8 kg (169 lb 6.4 oz)     Body mass index is 21.9 kg/m².        Treatment Area: Neck/Lymph Nodes    Patient was seen today for weekly visit.     Comfort Alteration  Fatigue: Mild    Mucous Membrane Alteration  Mucositis Due to Radiation: No  Thrush: No  Voice Changes: No    Nutritional Alteration  Anorexia: No   Nausea: No   Vomiting: No     Ventilation Alteration  Cough:No    Skin Alteration   Sensation: WNL    Radiation Dermatitis:  Intact [x]     Erythema  []     Discoloration  []     Rash []     Dry desquamation  []     Moist desquamation []       Emotional  Coping: effective      Injury, potential bleeding or infection:     Lab Results   Component Value Date    WBC 11.9 (H) 07/01/2024    HGB 9.8 (L) 07/01/2024    HCT 30.7 (L) 07/01/2024     07/01/2024         /67   Pulse 72   Temp 97.5 °F (36.4 °C) (Temporal)   Resp 16   Wt 79.5 kg (175 lb 3.2 oz)   BMI 21.90 kg/m²      Pain Assessment: 0-10  Pain Level: 0         Assessment/Plan: Patient was seen today for weekly visit.  States new lump to his posterior neck that he noticed about 2-3 weeks ago.  States this painful.  Denies any other side effects of treatment.  Plan of care ongoing.      Roshni Lee RN

## 2024-07-05 ENCOUNTER — APPOINTMENT (OUTPATIENT)
Dept: RADIATION ONCOLOGY | Age: 64
End: 2024-07-05
Payer: COMMERCIAL

## 2024-07-08 ENCOUNTER — HOSPITAL ENCOUNTER (OUTPATIENT)
Dept: RADIATION ONCOLOGY | Age: 64
Discharge: HOME | End: 2024-07-08
Payer: COMMERCIAL

## 2024-07-08 PROCEDURE — 77386 HC NTSTY MODUL RAD TX DLVR CPLX: CPT | Performed by: RADIOLOGY

## 2024-07-09 ENCOUNTER — HOSPITAL ENCOUNTER (OUTPATIENT)
Dept: RADIATION ONCOLOGY | Age: 64
Discharge: HOME | End: 2024-07-09
Payer: COMMERCIAL

## 2024-07-09 PROCEDURE — 77386 HC NTSTY MODUL RAD TX DLVR CPLX: CPT | Performed by: RADIOLOGY

## 2024-07-10 ENCOUNTER — HOSPITAL ENCOUNTER (OUTPATIENT)
Dept: RADIATION ONCOLOGY | Age: 64
Discharge: HOME | End: 2024-07-10
Payer: COMMERCIAL

## 2024-07-10 VITALS
WEIGHT: 176.4 LBS | DIASTOLIC BLOOD PRESSURE: 72 MMHG | BODY MASS INDEX: 22.05 KG/M2 | SYSTOLIC BLOOD PRESSURE: 115 MMHG | TEMPERATURE: 97.1 F | HEART RATE: 69 BPM | RESPIRATION RATE: 16 BRPM

## 2024-07-10 PROCEDURE — 77336 RADIATION PHYSICS CONSULT: CPT | Performed by: RADIOLOGY

## 2024-07-10 PROCEDURE — 77386 HC NTSTY MODUL RAD TX DLVR CPLX: CPT | Performed by: RADIOLOGY

## 2024-07-10 ASSESSMENT — PAIN SCALES - GENERAL: PAINLEVEL_OUTOF10: 2

## 2024-07-10 NOTE — PROGRESS NOTES
Jony Portillo  7/10/2024  Wt Readings from Last 3 Encounters:   07/10/24 80 kg (176 lb 6.4 oz)   07/03/24 79.5 kg (175 lb 3.2 oz)   07/01/24 80.7 kg (177 lb 14.4 oz)     Body mass index is 22.05 kg/m².        Treatment Area: Neck/Lymph Nodes    Patient was seen today for weekly visit.     Comfort Alteration  Fatigue: Mild    Mucous Membrane Alteration  Mucositis Due to Radiation: No  Thrush: No  Voice Changes: No    Nutritional Alteration  Anorexia: No   Nausea: No   Vomiting: No     Ventilation Alteration  Cough:No    Skin Alteration   Sensation: WNL    Radiation Dermatitis:  Intact [x]     Erythema  []     Discoloration  []     Rash []     Dry desquamation  []     Moist desquamation []       Emotional  Coping: effective      Injury, potential bleeding or infection:     Lab Results   Component Value Date    WBC 11.9 (H) 07/01/2024    HGB 9.8 (L) 07/01/2024    HCT 30.7 (L) 07/01/2024     07/01/2024         /72   Pulse 69   Temp 97.1 °F (36.2 °C) (Temporal)   Resp 16   Wt 80 kg (176 lb 6.4 oz)   BMI 22.05 kg/m²      Pain Assessment: 0-10  Pain Level: 2         Assessment/Plan: Patient was seen today for weekly visit.  States he feels swelling to neck has decreased and denies discomfort with swallowing.  Notes fatigue.  Plan of care ongoing.    Roshni Lee RN

## 2024-07-10 NOTE — PROGRESS NOTES
[facility]       Radiation Oncology          [address]          [place] [address]        O: [phone]        F: [phone]       [facility]             RADIATION ONCOLOGY WEEKLY PROGRESS NOTE  Patient ID:   Jony Portillo  : 1960   MRN: [id number]    Location:  [facility] Radiation Oncology,   [address] [place] [address]   [phone]    DIAGNOSIS:  Cancer Staging   Malignant neoplasm of upper lobe of right lung (HCC)  Staging form: Lung, AJCC 8th Edition  - Clinical: Stage ANUP (cT2, cN2, cM1b) - Signed by Mamie Guzmán MD on 2023      TREATMENT DETAILS:  Treatment Site: B/L Neck Axilla LNs  Actual Dose: 1750cGy  Total Planned Dose: 5000cGy  Treatment Technique: IMRT  Fraction Technique: Daily  Therapy imaging monitoring: CBCT daily  Concurrent Systemic Therapy: NA    SUBJECTIVE:   Patient seen for their weekly on treatment evaluation today.  Patient is doing well denying any complaints. He denies any trouble with swallowing or skin irritation.  He notes a little nodule on his posterior neck which is soft nontender and mobile.  It was noted on his previous PET scans.      OBJECTIVE:   CHAPERONE: Family/friend/companieon Present    ECO Asymptomatic    VITAL SIGNS: /72   Pulse 69   Temp 97.1 °F (36.2 °C) (Temporal)   Resp 16   Wt 80 kg (176 lb 6.4 oz)   BMI 22.05 kg/m²   Wt Readings from Last 5 Encounters:   07/10/24 80 kg (176 lb 6.4 oz)   24 79.5 kg (175 lb 3.2 oz)   24 80.7 kg (177 lb 14.4 oz)   06/10/24 76.8 kg (169 lb 6.4 oz)   24 75.3 kg (166 lb)     GENERAL:  General appearance is that of a well-nourished, well-developed in no apparent distress.  HEENT: (+) R Neck lymphadenopathy (+) edema.  Palpable nodule in the posterior neck.  HEART:  Normal rate and regular rhythm, normal heart sounds.   LUNGS:  Pulmonary effort normal. Normal breath sounds.   EXTREMITIES:  No edema.  No calf

## 2024-07-11 ENCOUNTER — HOSPITAL ENCOUNTER (OUTPATIENT)
Dept: RADIATION ONCOLOGY | Age: 64
Discharge: HOME | End: 2024-07-11
Payer: COMMERCIAL

## 2024-07-11 PROCEDURE — 77386 HC NTSTY MODUL RAD TX DLVR CPLX: CPT | Performed by: RADIOLOGY

## 2024-07-12 ENCOUNTER — TELEPHONE (OUTPATIENT)
Dept: INFUSION THERAPY | Age: 64
End: 2024-07-12

## 2024-07-12 ENCOUNTER — HOSPITAL ENCOUNTER (OUTPATIENT)
Dept: RADIATION ONCOLOGY | Age: 64
Discharge: HOME | End: 2024-07-12
Payer: COMMERCIAL

## 2024-07-12 PROCEDURE — 77386 HC NTSTY MODUL RAD TX DLVR CPLX: CPT | Performed by: RADIOLOGY

## 2024-07-15 ENCOUNTER — HOSPITAL ENCOUNTER (OUTPATIENT)
Dept: RADIATION ONCOLOGY | Age: 64
Discharge: HOME | End: 2024-07-15
Payer: COMMERCIAL

## 2024-07-15 PROCEDURE — 77386 HC NTSTY MODUL RAD TX DLVR CPLX: CPT | Performed by: RADIOLOGY

## 2024-07-16 ENCOUNTER — HOSPITAL ENCOUNTER (OUTPATIENT)
Dept: RADIATION ONCOLOGY | Age: 64
Discharge: HOME | End: 2024-07-16
Payer: COMMERCIAL

## 2024-07-16 PROCEDURE — 77386 HC NTSTY MODUL RAD TX DLVR CPLX: CPT | Performed by: RADIOLOGY

## 2024-07-17 ENCOUNTER — HOSPITAL ENCOUNTER (OUTPATIENT)
Dept: RADIATION ONCOLOGY | Age: 64
Discharge: HOME | End: 2024-07-17
Payer: COMMERCIAL

## 2024-07-17 VITALS
SYSTOLIC BLOOD PRESSURE: 106 MMHG | WEIGHT: 175 LBS | RESPIRATION RATE: 16 BRPM | TEMPERATURE: 98.2 F | HEART RATE: 76 BPM | DIASTOLIC BLOOD PRESSURE: 71 MMHG | BODY MASS INDEX: 21.87 KG/M2

## 2024-07-17 PROCEDURE — 77336 RADIATION PHYSICS CONSULT: CPT | Performed by: RADIOLOGY

## 2024-07-17 PROCEDURE — 77386 HC NTSTY MODUL RAD TX DLVR CPLX: CPT | Performed by: RADIOLOGY

## 2024-07-17 ASSESSMENT — PAIN SCALES - GENERAL: PAINLEVEL_OUTOF10: 0

## 2024-07-17 NOTE — PROGRESS NOTES
Jony Portillo  7/17/2024  Wt Readings from Last 3 Encounters:   07/17/24 79.4 kg (175 lb)   07/10/24 80 kg (176 lb 6.4 oz)   07/03/24 79.5 kg (175 lb 3.2 oz)     Body mass index is 21.87 kg/m².        Treatment Area: Neck/Lymph Nodes    Patient was seen today for weekly visit.     Comfort Alteration  Fatigue: Mild    Mucous Membrane Alteration  Mucositis Due to Radiation: No  Thrush: No  Voice Changes: No    Nutritional Alteration  Anorexia: No   Nausea: No   Vomiting: No     Ventilation Alteration  Cough:No    Skin Alteration   Sensation: WNL    Radiation Dermatitis:  Intact [x]     Erythema  [x]     Discoloration  []     Rash []     Dry desquamation  []     Moist desquamation []       Emotional  Coping: effective      Injury, potential bleeding or infection:     Lab Results   Component Value Date    WBC 11.9 (H) 07/01/2024    HGB 9.8 (L) 07/01/2024    HCT 30.7 (L) 07/01/2024     07/01/2024         /71   Pulse 76   Temp 98.2 °F (36.8 °C) (Temporal)   Resp 16   Wt 79.4 kg (175 lb)   BMI 21.87 kg/m²         Pain Level: 0         Assessment/Plan: Patient was seen today for weekly visit. Erythema noted to neck and using cocoa butter to his skin.  Reports sore throat that is manageable without need for medications. Reports fatigue as well.   Plan of care ongoing.    Roshni Lee RN

## 2024-07-17 NOTE — PROGRESS NOTES
[facility]       Radiation Oncology          [address]          [place] [address]        O: [phone]        F: [phone]       [facility]             RADIATION ONCOLOGY WEEKLY PROGRESS NOTE  Patient ID:   Jony Portillo  : 1960   MRN: [id number]    Location:  [facility] Radiation Oncology,   [address] [place] [address]   [phone]    DIAGNOSIS:  Cancer Staging   Malignant neoplasm of upper lobe of right lung (HCC)  Staging form: Lung, AJCC 8th Edition  - Clinical: Stage ANUP (cT2, cN2, cM1b) - Signed by Mamie Guzmán MD on 2023      TREATMENT DETAILS:  Treatment Site: B/L Neck Axilla LNs  Actual Dose: 3000cGy  Total Planned Dose: 5000cGy  Treatment Technique: IMRT  Fraction Technique: Daily  Therapy imaging monitoring: CBCT daily  Concurrent Systemic Therapy: NA    SUBJECTIVE:   Patient seen for their weekly on treatment evaluation today.  Patient is doing well denying any complaints. He does note redness of the skin and mild irritation in the throat when eating.  He denies any trouble with eating his diet though.  He has been using moisturizer with cocoa butter but has not been using his steroid cream.       OBJECTIVE:   CHAPERONE: Not Required    ECO Asymptomatic    VITAL SIGNS: /71   Pulse 76   Temp 98.2 °F (36.8 °C) (Temporal)   Resp 16   Wt 79.4 kg (175 lb)   BMI 21.87 kg/m²   Wt Readings from Last 5 Encounters:   24 79.4 kg (175 lb)   07/10/24 80 kg (176 lb 6.4 oz)   24 79.5 kg (175 lb 3.2 oz)   24 80.7 kg (177 lb 14.4 oz)   06/10/24 76.8 kg (169 lb 6.4 oz)     GENERAL:  General appearance is that of a well-nourished, well-developed in no apparent distress.  HEENT: (+) R Neck lymphadenopathy (+) edema.  Palpable nodule in the posterior neck.  HEART:  Normal rate and regular rhythm, normal heart sounds.   LUNGS:  Pulmonary effort normal. Normal breath sounds.   EXTREMITIES:  No

## 2024-07-18 ENCOUNTER — HOSPITAL ENCOUNTER (OUTPATIENT)
Dept: RADIATION ONCOLOGY | Age: 64
Discharge: HOME | End: 2024-07-18
Payer: COMMERCIAL

## 2024-07-18 PROCEDURE — 77386 HC NTSTY MODUL RAD TX DLVR CPLX: CPT | Performed by: RADIOLOGY

## 2024-07-19 ENCOUNTER — HOSPITAL ENCOUNTER (OUTPATIENT)
Dept: RADIATION ONCOLOGY | Age: 64
Discharge: HOME | End: 2024-07-19
Payer: COMMERCIAL

## 2024-07-19 PROCEDURE — 77386 HC NTSTY MODUL RAD TX DLVR CPLX: CPT | Performed by: RADIOLOGY

## 2024-07-22 ENCOUNTER — HOSPITAL ENCOUNTER (OUTPATIENT)
Dept: RADIATION ONCOLOGY | Age: 64
Discharge: HOME | End: 2024-07-22
Payer: COMMERCIAL

## 2024-07-22 PROCEDURE — 77386 HC NTSTY MODUL RAD TX DLVR CPLX: CPT | Performed by: RADIOLOGY

## 2024-07-23 ENCOUNTER — HOSPITAL ENCOUNTER (OUTPATIENT)
Dept: RADIATION ONCOLOGY | Age: 64
Discharge: HOME | End: 2024-07-23
Payer: COMMERCIAL

## 2024-07-23 PROCEDURE — 77386 HC NTSTY MODUL RAD TX DLVR CPLX: CPT | Performed by: RADIOLOGY

## 2024-07-24 ENCOUNTER — HOSPITAL ENCOUNTER (OUTPATIENT)
Dept: RADIATION ONCOLOGY | Age: 64
Discharge: HOME | End: 2024-07-24
Payer: COMMERCIAL

## 2024-07-24 VITALS
BODY MASS INDEX: 21.82 KG/M2 | DIASTOLIC BLOOD PRESSURE: 70 MMHG | OXYGEN SATURATION: 97 % | TEMPERATURE: 97.7 F | RESPIRATION RATE: 16 BRPM | HEART RATE: 62 BPM | WEIGHT: 174.6 LBS | SYSTOLIC BLOOD PRESSURE: 111 MMHG

## 2024-07-24 DIAGNOSIS — C34.11 MALIGNANT NEOPLASM OF UPPER LOBE OF RIGHT LUNG (HCC): Primary | ICD-10-CM

## 2024-07-24 DIAGNOSIS — C77.0 METASTASIS TO CERVICAL LYMPH NODE (HCC): ICD-10-CM

## 2024-07-24 PROCEDURE — 77386 HC NTSTY MODUL RAD TX DLVR CPLX: CPT | Performed by: RADIOLOGY

## 2024-07-24 NOTE — PROGRESS NOTES
[facility]       Radiation Oncology          [address]          [place] [id number]        O: [phone]        F: [phone]       [facility]             RADIATION ONCOLOGY WEEKLY PROGRESS NOTE  Patient ID:   Jony Portillo  : 1960   MRN: [id number]    Location:  [facility] Radiation Oncology,   [address] [place] [address]   [phone]    DIAGNOSIS:  Cancer Staging   Malignant neoplasm of upper lobe of right lung (HCC)  Staging form: Lung, AJCC 8th Edition  - Clinical: Stage ANUP (cT2, cN2, cM1b) - Signed by Mamie Guzmán MD on 2023      TREATMENT DETAILS:  Treatment Site: B/L Neck Axilla LNs  Actual Dose: 4250cGy  Total Planned Dose: 5000cGy  Treatment Technique: IMRT  Fraction Technique: Daily  Therapy imaging monitoring: CBCT daily  Concurrent Systemic Therapy: NA    SUBJECTIVE:   Patient seen for their weekly on treatment evaluation today.  Patient is doing well denying any complaints. He does note more redness of the skin and mild irritation in the throat when eating.  He denies any change with his diet though.  He has been using moisturizer and using his steroid cream.       OBJECTIVE:   CHAPERONE: Not Required    ECO Asymptomatic    VITAL SIGNS: /70   Pulse 62   Temp 97.7 °F (36.5 °C) (Temporal)   Resp 16   Wt 79.2 kg (174 lb 9.6 oz)   SpO2 97%   BMI 21.82 kg/m²   Wt Readings from Last 5 Encounters:   24 79.2 kg (174 lb 9.6 oz)   24 79.4 kg (175 lb)   07/10/24 80 kg (176 lb 6.4 oz)   24 79.5 kg (175 lb 3.2 oz)   24 80.7 kg (177 lb 14.4 oz)     GENERAL:  General appearance is that of a well-nourished, well-developed in no apparent distress.  HEENT: (+) R Neck lymphadenopathy (+) edema.  Palpable nodule in the posterior neck.  HEART:  Normal rate and regular rhythm, normal heart sounds.   LUNGS:  Pulmonary effort normal. Normal breath sounds.   EXTREMITIES:  No edema.  No

## 2024-07-24 NOTE — PROGRESS NOTES
Jony Portillo  7/24/2024  Wt Readings from Last 3 Encounters:   07/24/24 79.2 kg (174 lb 9.6 oz)   07/17/24 79.4 kg (175 lb)   07/10/24 80 kg (176 lb 6.4 oz)     Body mass index is 21.82 kg/m².        Treatment Area: Neck/Lymph Nodes    Patient was seen today for weekly visit.     Comfort Alteration  Fatigue: Mild    Mucous Membrane Alteration  Mucositis Due to Radiation: No  Thrush: No  Voice Changes: No    Nutritional Alteration  Anorexia: No   Nausea: No   Vomiting: No     Ventilation Alteration  Cough:No    Skin Alteration   Sensation: WNL    Radiation Dermatitis:  Intact [x]     Erythema  [x]     Discoloration  []     Rash []     Dry desquamation  []     Moist desquamation []       Emotional  Coping: effective      Injury, potential bleeding or infection:     Lab Results   Component Value Date    WBC 11.9 (H) 07/01/2024    HGB 9.8 (L) 07/01/2024    HCT 30.7 (L) 07/01/2024     07/01/2024         /70   Pulse 62   Temp 97.7 °F (36.5 °C) (Temporal)   Resp 16   Wt 79.2 kg (174 lb 9.6 oz)   SpO2 97%   BMI 21.82 kg/m²      Pain Assessment: 0-10  Pain Level: 3         Assessment/Plan: Patient was seen today for weekly visit.  Ambulatory on arrival with steady gait.  Wife in room with him.  Some redness noted to neck and using creams to his skin.  Reports sore throat that is manageable without need for medications. Eating ok and no issues with swallowing.   Plan of care ongoing.  Will follow up in 3 months with CT scan.  Asha Oliver RN

## 2024-07-25 ENCOUNTER — HOSPITAL ENCOUNTER (OUTPATIENT)
Dept: RADIATION ONCOLOGY | Age: 64
Discharge: HOME | End: 2024-07-25
Payer: COMMERCIAL

## 2024-07-25 PROCEDURE — 77386 HC NTSTY MODUL RAD TX DLVR CPLX: CPT | Performed by: RADIOLOGY

## 2024-07-26 ENCOUNTER — HOSPITAL ENCOUNTER (OUTPATIENT)
Dept: RADIATION ONCOLOGY | Age: 64
Discharge: HOME | End: 2024-07-26
Payer: COMMERCIAL

## 2024-07-26 PROCEDURE — 77386 HC NTSTY MODUL RAD TX DLVR CPLX: CPT | Performed by: RADIOLOGY

## 2024-07-29 ENCOUNTER — OFFICE VISIT (OUTPATIENT)
Dept: ONCOLOGY | Age: 64
End: 2024-07-29
Payer: COMMERCIAL

## 2024-07-29 ENCOUNTER — TELEPHONE (OUTPATIENT)
Dept: ONCOLOGY | Age: 64
End: 2024-07-29

## 2024-07-29 ENCOUNTER — HOSPITAL ENCOUNTER (OUTPATIENT)
Dept: INFUSION THERAPY | Facility: MEDICAL CENTER | Age: 64
Discharge: HOME | End: 2024-07-29
Payer: COMMERCIAL

## 2024-07-29 ENCOUNTER — HOSPITAL ENCOUNTER (OUTPATIENT)
Dept: RADIATION ONCOLOGY | Age: 64
Discharge: HOME | End: 2024-07-29
Payer: COMMERCIAL

## 2024-07-29 VITALS
BODY MASS INDEX: 21.67 KG/M2 | WEIGHT: 173.4 LBS | HEART RATE: 66 BPM | SYSTOLIC BLOOD PRESSURE: 111 MMHG | DIASTOLIC BLOOD PRESSURE: 64 MMHG | TEMPERATURE: 98.3 F

## 2024-07-29 DIAGNOSIS — Z72.0 TOBACCO ABUSE: ICD-10-CM

## 2024-07-29 DIAGNOSIS — C34.11 MALIGNANT NEOPLASM OF UPPER LOBE OF RIGHT LUNG (HCC): Primary | ICD-10-CM

## 2024-07-29 DIAGNOSIS — C77.0 METASTASIS TO CERVICAL LYMPH NODE (HCC): ICD-10-CM

## 2024-07-29 DIAGNOSIS — C34.90 MALIGNANT NEOPLASM OF LUNG, UNSPECIFIED LATERALITY, UNSPECIFIED PART OF LUNG (HCC): ICD-10-CM

## 2024-07-29 DIAGNOSIS — E03.9 ACQUIRED HYPOTHYROIDISM: ICD-10-CM

## 2024-07-29 DIAGNOSIS — C77.1 METASTASIS TO MEDIASTINAL LYMPH NODE (HCC): ICD-10-CM

## 2024-07-29 LAB
ALBUMIN SERPL-MCNC: 3.8 G/DL (ref 3.5–5.2)
ALP SERPL-CCNC: 52 U/L (ref 40–129)
ALT SERPL-CCNC: 10 U/L (ref 5–41)
ANION GAP SERPL CALCULATED.3IONS-SCNC: 10 MMOL/L (ref 9–17)
AST SERPL-CCNC: 15 U/L
BASOPHILS # BLD: 0.09 K/UL (ref 0–0.2)
BASOPHILS NFR BLD: 1 % (ref 0–2)
BILIRUB SERPL-MCNC: 0.2 MG/DL (ref 0.3–1.2)
BUN SERPL-MCNC: 17 MG/DL (ref 8–23)
BUN/CREAT SERPL: 17 (ref 9–20)
CALCIUM SERPL-MCNC: 8.8 MG/DL (ref 8.6–10.4)
CHLORIDE SERPL-SCNC: 102 MMOL/L (ref 98–107)
CO2 SERPL-SCNC: 25 MMOL/L (ref 20–31)
CREAT SERPL-MCNC: 1 MG/DL (ref 0.7–1.2)
EOSINOPHIL # BLD: 0.09 K/UL (ref 0–0.44)
EOSINOPHILS RELATIVE PERCENT: 1 % (ref 1–4)
ERYTHROCYTE [DISTWIDTH] IN BLOOD BY AUTOMATED COUNT: 13.6 % (ref 11.8–14.4)
GFR, ESTIMATED: 85 ML/MIN/1.73M2
GLUCOSE SERPL-MCNC: 95 MG/DL (ref 70–99)
HCT VFR BLD AUTO: 35.1 % (ref 40.7–50.3)
HGB BLD-MCNC: 11.6 G/DL (ref 13–17)
IMM GRANULOCYTES # BLD AUTO: 0.09 K/UL (ref 0–0.3)
IMM GRANULOCYTES NFR BLD: 1 %
LYMPHOCYTES NFR BLD: 0.52 K/UL (ref 1.1–3.7)
LYMPHOCYTES RELATIVE PERCENT: 6 % (ref 24–43)
MCH RBC QN AUTO: 34 PG (ref 25.2–33.5)
MCHC RBC AUTO-ENTMCNC: 33 G/DL (ref 28.4–34.8)
MCV RBC AUTO: 102.9 FL (ref 82.6–102.9)
MONOCYTES NFR BLD: 0.78 K/UL (ref 0.1–1.2)
MONOCYTES NFR BLD: 9 % (ref 3–12)
NEUTROPHILS NFR BLD: 82 % (ref 36–65)
NEUTS SEG NFR BLD: 7.13 K/UL (ref 1.5–8.1)
NRBC BLD-RTO: 0 PER 100 WBC
PLATELET # BLD AUTO: 259 K/UL (ref 138–453)
PMV BLD AUTO: 9.1 FL (ref 8.1–13.5)
POTASSIUM SERPL-SCNC: 4 MMOL/L (ref 3.7–5.3)
PROT SERPL-MCNC: 6.7 G/DL (ref 6.4–8.3)
RBC # BLD AUTO: 3.41 M/UL (ref 4.21–5.77)
SODIUM SERPL-SCNC: 137 MMOL/L (ref 135–144)
TSH SERPL DL<=0.05 MIU/L-ACNC: 1.89 UIU/ML (ref 0.3–5)
WBC OTHER # BLD: 8.7 K/UL (ref 3.5–11.3)

## 2024-07-29 PROCEDURE — 96372 THER/PROPH/DIAG INJ SC/IM: CPT

## 2024-07-29 PROCEDURE — 99214 OFFICE O/P EST MOD 30 MIN: CPT | Performed by: INTERNAL MEDICINE

## 2024-07-29 PROCEDURE — 96375 TX/PRO/DX INJ NEW DRUG ADDON: CPT

## 2024-07-29 PROCEDURE — 36591 DRAW BLOOD OFF VENOUS DEVICE: CPT

## 2024-07-29 PROCEDURE — 2580000003 HC RX 258: Performed by: INTERNAL MEDICINE

## 2024-07-29 PROCEDURE — 6360000002 HC RX W HCPCS: Performed by: INTERNAL MEDICINE

## 2024-07-29 PROCEDURE — 96409 CHEMO IV PUSH SNGL DRUG: CPT

## 2024-07-29 PROCEDURE — 99211 OFF/OP EST MAY X REQ PHY/QHP: CPT

## 2024-07-29 PROCEDURE — 77386 HC NTSTY MODUL RAD TX DLVR CPLX: CPT | Performed by: RADIOLOGY

## 2024-07-29 PROCEDURE — 85025 COMPLETE CBC W/AUTO DIFF WBC: CPT

## 2024-07-29 PROCEDURE — 84443 ASSAY THYROID STIM HORMONE: CPT

## 2024-07-29 PROCEDURE — 80053 COMPREHEN METABOLIC PANEL: CPT

## 2024-07-29 RX ORDER — SODIUM CHLORIDE 9 MG/ML
5-250 INJECTION, SOLUTION INTRAVENOUS PRN
Status: CANCELLED | OUTPATIENT
Start: 2024-07-29

## 2024-07-29 RX ORDER — FAMOTIDINE 10 MG/ML
20 INJECTION, SOLUTION INTRAVENOUS
OUTPATIENT
Start: 2024-09-30

## 2024-07-29 RX ORDER — MEPERIDINE HYDROCHLORIDE 50 MG/ML
12.5 INJECTION INTRAMUSCULAR; INTRAVENOUS; SUBCUTANEOUS PRN
OUTPATIENT
Start: 2024-09-09

## 2024-07-29 RX ORDER — HEPARIN SODIUM (PORCINE) LOCK FLUSH IV SOLN 100 UNIT/ML 100 UNIT/ML
500 SOLUTION INTRAVENOUS PRN
OUTPATIENT
Start: 2024-08-19

## 2024-07-29 RX ORDER — CYANOCOBALAMIN 1000 UG/ML
1000 INJECTION, SOLUTION INTRAMUSCULAR; SUBCUTANEOUS ONCE
OUTPATIENT
Start: 2024-09-30 | End: 2024-09-30

## 2024-07-29 RX ORDER — PROCHLORPERAZINE EDISYLATE 5 MG/ML
10 INJECTION INTRAMUSCULAR; INTRAVENOUS
OUTPATIENT
Start: 2024-09-09

## 2024-07-29 RX ORDER — EPINEPHRINE 1 MG/ML
0.3 INJECTION, SOLUTION, CONCENTRATE INTRAVENOUS PRN
OUTPATIENT
Start: 2024-08-19

## 2024-07-29 RX ORDER — EPINEPHRINE 1 MG/ML
0.3 INJECTION, SOLUTION, CONCENTRATE INTRAVENOUS PRN
Status: CANCELLED | OUTPATIENT
Start: 2024-07-29

## 2024-07-29 RX ORDER — MEPERIDINE HYDROCHLORIDE 50 MG/ML
12.5 INJECTION INTRAMUSCULAR; INTRAVENOUS; SUBCUTANEOUS PRN
OUTPATIENT
Start: 2024-08-19

## 2024-07-29 RX ORDER — PROCHLORPERAZINE EDISYLATE 5 MG/ML
10 INJECTION INTRAMUSCULAR; INTRAVENOUS
Status: CANCELLED | OUTPATIENT
Start: 2024-07-29

## 2024-07-29 RX ORDER — DIPHENHYDRAMINE HYDROCHLORIDE 50 MG/ML
50 INJECTION INTRAMUSCULAR; INTRAVENOUS
OUTPATIENT
Start: 2024-09-09

## 2024-07-29 RX ORDER — SODIUM CHLORIDE 9 MG/ML
5-250 INJECTION, SOLUTION INTRAVENOUS PRN
Status: DISCONTINUED | OUTPATIENT
Start: 2024-07-29 | End: 2024-07-30 | Stop reason: HOSPADM

## 2024-07-29 RX ORDER — ACETAMINOPHEN 325 MG/1
650 TABLET ORAL
Status: CANCELLED | OUTPATIENT
Start: 2024-07-29

## 2024-07-29 RX ORDER — HEPARIN 100 UNIT/ML
500 SYRINGE INTRAVENOUS PRN
Status: DISCONTINUED | OUTPATIENT
Start: 2024-07-29 | End: 2024-07-30 | Stop reason: HOSPADM

## 2024-07-29 RX ORDER — FAMOTIDINE 10 MG/ML
20 INJECTION, SOLUTION INTRAVENOUS
OUTPATIENT
Start: 2024-09-09

## 2024-07-29 RX ORDER — EPINEPHRINE 1 MG/ML
0.3 INJECTION, SOLUTION, CONCENTRATE INTRAVENOUS PRN
OUTPATIENT
Start: 2024-09-09

## 2024-07-29 RX ORDER — ONDANSETRON 2 MG/ML
8 INJECTION INTRAMUSCULAR; INTRAVENOUS
OUTPATIENT
Start: 2024-08-19

## 2024-07-29 RX ORDER — HEPARIN SODIUM (PORCINE) LOCK FLUSH IV SOLN 100 UNIT/ML 100 UNIT/ML
500 SOLUTION INTRAVENOUS PRN
Status: CANCELLED | OUTPATIENT
Start: 2024-07-29

## 2024-07-29 RX ORDER — ONDANSETRON 2 MG/ML
8 INJECTION INTRAMUSCULAR; INTRAVENOUS
OUTPATIENT
Start: 2024-09-30

## 2024-07-29 RX ORDER — SODIUM CHLORIDE 9 MG/ML
5-250 INJECTION, SOLUTION INTRAVENOUS PRN
OUTPATIENT
Start: 2024-08-19

## 2024-07-29 RX ORDER — PROCHLORPERAZINE EDISYLATE 5 MG/ML
10 INJECTION INTRAMUSCULAR; INTRAVENOUS
OUTPATIENT
Start: 2024-08-19

## 2024-07-29 RX ORDER — LORAZEPAM 2 MG/ML
0.5 INJECTION INTRAMUSCULAR
Status: CANCELLED | OUTPATIENT
Start: 2024-07-29

## 2024-07-29 RX ORDER — ACETAMINOPHEN 325 MG/1
650 TABLET ORAL
OUTPATIENT
Start: 2024-09-30

## 2024-07-29 RX ORDER — DIPHENHYDRAMINE HYDROCHLORIDE 50 MG/ML
50 INJECTION INTRAMUSCULAR; INTRAVENOUS
OUTPATIENT
Start: 2024-09-30

## 2024-07-29 RX ORDER — ALBUTEROL SULFATE 90 UG/1
4 AEROSOL, METERED RESPIRATORY (INHALATION) PRN
OUTPATIENT
Start: 2024-09-30

## 2024-07-29 RX ORDER — EPINEPHRINE 1 MG/ML
0.3 INJECTION, SOLUTION, CONCENTRATE INTRAVENOUS PRN
OUTPATIENT
Start: 2024-09-30

## 2024-07-29 RX ORDER — FAMOTIDINE 10 MG/ML
20 INJECTION, SOLUTION INTRAVENOUS
OUTPATIENT
Start: 2024-08-19

## 2024-07-29 RX ORDER — SODIUM CHLORIDE 0.9 % (FLUSH) 0.9 %
5-40 SYRINGE (ML) INJECTION PRN
OUTPATIENT
Start: 2024-08-19

## 2024-07-29 RX ORDER — HEPARIN SODIUM (PORCINE) LOCK FLUSH IV SOLN 100 UNIT/ML 100 UNIT/ML
500 SOLUTION INTRAVENOUS PRN
OUTPATIENT
Start: 2024-09-30

## 2024-07-29 RX ORDER — SODIUM CHLORIDE 0.9 % (FLUSH) 0.9 %
5-40 SYRINGE (ML) INJECTION PRN
Status: DISCONTINUED | OUTPATIENT
Start: 2024-07-29 | End: 2024-07-30 | Stop reason: HOSPADM

## 2024-07-29 RX ORDER — ALBUTEROL SULFATE 90 UG/1
4 AEROSOL, METERED RESPIRATORY (INHALATION) PRN
OUTPATIENT
Start: 2024-08-19

## 2024-07-29 RX ORDER — SODIUM CHLORIDE 9 MG/ML
INJECTION, SOLUTION INTRAVENOUS CONTINUOUS
OUTPATIENT
Start: 2024-09-30

## 2024-07-29 RX ORDER — SODIUM CHLORIDE 9 MG/ML
5-250 INJECTION, SOLUTION INTRAVENOUS PRN
OUTPATIENT
Start: 2024-09-09

## 2024-07-29 RX ORDER — SODIUM CHLORIDE 9 MG/ML
INJECTION, SOLUTION INTRAVENOUS CONTINUOUS
OUTPATIENT
Start: 2024-09-09

## 2024-07-29 RX ORDER — CYANOCOBALAMIN 1000 UG/ML
1000 INJECTION, SOLUTION INTRAMUSCULAR; SUBCUTANEOUS ONCE
Status: COMPLETED | OUTPATIENT
Start: 2024-07-29 | End: 2024-07-29

## 2024-07-29 RX ORDER — SODIUM CHLORIDE 9 MG/ML
INJECTION, SOLUTION INTRAVENOUS CONTINUOUS
Status: CANCELLED | OUTPATIENT
Start: 2024-07-29

## 2024-07-29 RX ORDER — HEPARIN SODIUM (PORCINE) LOCK FLUSH IV SOLN 100 UNIT/ML 100 UNIT/ML
500 SOLUTION INTRAVENOUS PRN
OUTPATIENT
Start: 2024-09-09

## 2024-07-29 RX ORDER — SODIUM CHLORIDE 0.9 % (FLUSH) 0.9 %
5-40 SYRINGE (ML) INJECTION PRN
Status: CANCELLED | OUTPATIENT
Start: 2024-07-29

## 2024-07-29 RX ORDER — SODIUM CHLORIDE 0.9 % (FLUSH) 0.9 %
5-40 SYRINGE (ML) INJECTION PRN
OUTPATIENT
Start: 2024-09-09

## 2024-07-29 RX ORDER — DEXAMETHASONE SODIUM PHOSPHATE 10 MG/ML
8 INJECTION INTRAMUSCULAR; INTRAVENOUS ONCE
Status: COMPLETED | OUTPATIENT
Start: 2024-07-29 | End: 2024-07-29

## 2024-07-29 RX ORDER — DIPHENHYDRAMINE HYDROCHLORIDE 50 MG/ML
50 INJECTION INTRAMUSCULAR; INTRAVENOUS
Status: CANCELLED | OUTPATIENT
Start: 2024-07-29

## 2024-07-29 RX ORDER — SODIUM CHLORIDE 9 MG/ML
INJECTION, SOLUTION INTRAVENOUS CONTINUOUS
OUTPATIENT
Start: 2024-08-19

## 2024-07-29 RX ORDER — ACETAMINOPHEN 325 MG/1
650 TABLET ORAL
OUTPATIENT
Start: 2024-08-19

## 2024-07-29 RX ORDER — MEPERIDINE HYDROCHLORIDE 50 MG/ML
12.5 INJECTION INTRAMUSCULAR; INTRAVENOUS; SUBCUTANEOUS PRN
Status: CANCELLED | OUTPATIENT
Start: 2024-07-29

## 2024-07-29 RX ORDER — LORAZEPAM 2 MG/ML
0.5 INJECTION INTRAMUSCULAR
OUTPATIENT
Start: 2024-09-30

## 2024-07-29 RX ORDER — ONDANSETRON 2 MG/ML
8 INJECTION INTRAMUSCULAR; INTRAVENOUS
OUTPATIENT
Start: 2024-09-09

## 2024-07-29 RX ORDER — MEPERIDINE HYDROCHLORIDE 50 MG/ML
12.5 INJECTION INTRAMUSCULAR; INTRAVENOUS; SUBCUTANEOUS PRN
OUTPATIENT
Start: 2024-09-30

## 2024-07-29 RX ORDER — PROCHLORPERAZINE EDISYLATE 5 MG/ML
10 INJECTION INTRAMUSCULAR; INTRAVENOUS
OUTPATIENT
Start: 2024-09-30

## 2024-07-29 RX ORDER — ALBUTEROL SULFATE 90 UG/1
4 AEROSOL, METERED RESPIRATORY (INHALATION) PRN
Status: CANCELLED | OUTPATIENT
Start: 2024-07-29

## 2024-07-29 RX ORDER — ONDANSETRON 2 MG/ML
8 INJECTION INTRAMUSCULAR; INTRAVENOUS
Status: CANCELLED | OUTPATIENT
Start: 2024-07-29

## 2024-07-29 RX ORDER — DIPHENHYDRAMINE HYDROCHLORIDE 50 MG/ML
50 INJECTION INTRAMUSCULAR; INTRAVENOUS
OUTPATIENT
Start: 2024-08-19

## 2024-07-29 RX ORDER — ALBUTEROL SULFATE 90 UG/1
4 AEROSOL, METERED RESPIRATORY (INHALATION) PRN
OUTPATIENT
Start: 2024-09-09

## 2024-07-29 RX ORDER — ACETAMINOPHEN 325 MG/1
650 TABLET ORAL
OUTPATIENT
Start: 2024-09-09

## 2024-07-29 RX ORDER — LORAZEPAM 2 MG/ML
0.5 INJECTION INTRAMUSCULAR
OUTPATIENT
Start: 2024-09-09

## 2024-07-29 RX ORDER — LORAZEPAM 2 MG/ML
0.5 INJECTION INTRAMUSCULAR
OUTPATIENT
Start: 2024-08-19

## 2024-07-29 RX ORDER — SODIUM CHLORIDE 9 MG/ML
5-250 INJECTION, SOLUTION INTRAVENOUS PRN
OUTPATIENT
Start: 2024-09-30

## 2024-07-29 RX ORDER — FAMOTIDINE 10 MG/ML
20 INJECTION, SOLUTION INTRAVENOUS
Status: CANCELLED | OUTPATIENT
Start: 2024-07-29

## 2024-07-29 RX ORDER — DEXAMETHASONE 2 MG/1
TABLET ORAL
Qty: 15 TABLET | Refills: 0 | Status: SHIPPED | OUTPATIENT
Start: 2024-07-29

## 2024-07-29 RX ORDER — SODIUM CHLORIDE 0.9 % (FLUSH) 0.9 %
5-40 SYRINGE (ML) INJECTION PRN
OUTPATIENT
Start: 2024-09-30

## 2024-07-29 RX ADMIN — PEMETREXED DISODIUM 1000 MG: 500 INJECTION, POWDER, LYOPHILIZED, FOR SOLUTION INTRAVENOUS at 13:43

## 2024-07-29 RX ADMIN — SODIUM CHLORIDE, PRESERVATIVE FREE 10 ML: 5 INJECTION INTRAVENOUS at 11:12

## 2024-07-29 RX ADMIN — CYANOCOBALAMIN 1000 MCG: 1000 INJECTION INTRAMUSCULAR; SUBCUTANEOUS at 13:08

## 2024-07-29 RX ADMIN — SODIUM CHLORIDE, PRESERVATIVE FREE 10 ML: 5 INJECTION INTRAVENOUS at 14:05

## 2024-07-29 RX ADMIN — HEPARIN 500 UNITS: 100 SYRINGE at 14:05

## 2024-07-29 RX ADMIN — DEXAMETHASONE SODIUM PHOSPHATE 8 MG: 10 INJECTION INTRAMUSCULAR; INTRAVENOUS at 13:05

## 2024-07-29 RX ADMIN — SODIUM CHLORIDE 100 ML/HR: 9 INJECTION, SOLUTION INTRAVENOUS at 11:12

## 2024-07-29 NOTE — PROGRESS NOTES
DIAGNOSIS:   Multiple cervical adenopathy  Biopsy showed adenocarcinoma suggestive of lung primary.  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  Good response to 4 cycles of combination therapy, plan to stop carboplatin and continue with Alimta maintenance starting May/2024  Focused radiation to oligometastatic disease  BRIEF CASE HISTORY:   Jony Portillo is a very pleasant 63 y.o. male who is referred to us for recently diagnosed adenocarcinoma of possible lung primary.  He presented with unprovoked DVT in the right lower extremity.  Because of the nature of his unprovoked DVT and his symptoms of neck swelling, he underwent a CT scan of the chest that showed significant mediastinal adenopathy.  He is sent to us for a consultation, he is having difficulty sleeping and change in voice.  Most of this difficulty in sleeping is secondary to swelling in the neck.  He does not have any chest pain or shortness of breath and he does not have any hemoptysis.  No change in weight.  Tolerating anticoagulation very well.  Confirmation of the biopsy showed that this is adenocarcinoma of lung primary.  PET CT scan showed right hilar mass with mediastinal and cervical adenopathy.  Molecular testing showed no targetable mutation, we decided to treat him with carboplatin, Alimta and Keytruda  After 4 cycles, the PET CT scan showed resolution of the known cervical and mediastinal hilar lymph node.  However, there was some intense activity and some lymph node that was deemed to be

## 2024-07-29 NOTE — TELEPHONE ENCOUNTER
Name: oJny Portillo  : 1960  MRN: [id number]    Oncology Navigation Follow-Up Note    Contact Type:  Medical Oncology    Notes:   Navigator met with pt. Face to face.  Triage, Pt. Requesting extension for FMLA until  please assist.      Electronically signed by Erica Martin RN on 2024 at 2:05 PM

## 2024-07-29 NOTE — PROGRESS NOTES
Patient arrived ambulatory with wife for cycle 7 day 1 treatment, Vitamin B 12 injection and physician visit.  Patient complains of being tired after receiving radiation. No other complaints or concerns.  Patient accessed;specimen sent.  Labs reviewed. Physician at bedside. Okay to treat.  Patient premedicated.  Vitamin B 12 injection given with band aide applied to site.  Alimta infused with no sign adverse reaction;line flushed.  Port flushed and heparinized with intact whitten needle removed per protocol.  Patient ambulated off unit with wife at discharge. Instructed to stop at  for AVS.

## 2024-07-29 NOTE — TELEPHONE ENCOUNTER
FRANCO HERE FOR FOLLOW UP & TX   Proceed with treatment today per orders.  Return in 3 weeks with treatment and labs.  CBC CMP TSH   please make sure that the patient has a PET CT scan scheduled in October instead of the planned CT scan by radiation oncology.  MD VISIT: 8/19/24 @ 11:30AM TX @ 11AM   CT SCAN CANCELED PER    PET: 10/30/24 @ 8AM ARRIVAL @ 7:30AM   AVS PRINTED AND GIVEN ON EXIT

## 2024-07-29 NOTE — PATIENT INSTRUCTIONS
Proceed with treatment today per orders.  Return in 3 weeks with treatment and labs.  CBC CMP TSH   please make sure that the patient has a PET CT scan scheduled in October instead of the planned CT scan by radiation oncology.

## 2024-07-31 NOTE — PROGRESS NOTES
[facility]            Radiation Oncology          [address] [place] [address]        O: [phone]        F: [phone]       [facility]           Dear Dr Guzmán:    Thank you for referring Jony Portillo to me for evaluation and treatment. Below is a summary of the patient's recently completed radiation course.  If you have questions, please do not hesitate to call me. I look forward to following this patient along with you.    Sincerely,  Electronically signed by Prince Gore MD on 2024 at 1:52 PM EDT      CC: Patient Care Team:  Keisha Hackett MD as PCP - General (Internal Medicine)  Keisha Hackett MD as PCP - Empaneled Provider  Erica Martin RN as Nurse Navigator (Oncology)  Angelic Ray as Financial Navigator  ------------------------------------------------------------------------------------------------------------------------------------------------------------------------------------------        Date of Service: 2024     Location:  [facility] Radiation Oncology,   [address] [place], [place] [address] [phone]        RADIATION ONCOLOGY END OF TREATMENT SUMMARY:    Patient ID:   Jony Portillo  : 1960   MRN: [id number]    DIAGNOSIS:  Cancer Staging   Malignant neoplasm of upper lobe of right lung (HCC)  Staging form: Lung, AJCC 8th Edition  - Clinical: Stage ANUP (cT2, cN2, cM1b) - Signed by Mamie Guzmán MD on 2023      TREATMENT DETAILS:    Treatment Technique: IMRT  Fraction Technique: Daily          Concurrent Systemic Therapy: NA    CLINICAL COURSE:    Patient completed the treatment as prescribed and tolerated treatment as expected. Patient developed mild throat irritation and skin erythema. Patient will come back in 3 months for a follow up visit and to assess treatment toxicity and response. Patient was advised to continue close follow up with medical oncology as well. 
Private car

## 2024-08-12 ENCOUNTER — TELEPHONE (OUTPATIENT)
Dept: INFUSION THERAPY | Age: 64
End: 2024-08-12

## 2024-08-15 ENCOUNTER — HOSPITAL ENCOUNTER (OUTPATIENT)
Facility: MEDICAL CENTER | Age: 64
End: 2024-08-15
Payer: COMMERCIAL

## 2024-08-19 ENCOUNTER — TELEPHONE (OUTPATIENT)
Dept: ONCOLOGY | Age: 64
End: 2024-08-19

## 2024-08-19 ENCOUNTER — OFFICE VISIT (OUTPATIENT)
Dept: ONCOLOGY | Age: 64
End: 2024-08-19
Payer: COMMERCIAL

## 2024-08-19 ENCOUNTER — HOSPITAL ENCOUNTER (OUTPATIENT)
Dept: INFUSION THERAPY | Facility: MEDICAL CENTER | Age: 64
Discharge: HOME | End: 2024-08-19
Payer: COMMERCIAL

## 2024-08-19 VITALS
WEIGHT: 166.5 LBS | SYSTOLIC BLOOD PRESSURE: 104 MMHG | TEMPERATURE: 98.4 F | HEART RATE: 78 BPM | DIASTOLIC BLOOD PRESSURE: 66 MMHG | RESPIRATION RATE: 18 BRPM | BODY MASS INDEX: 20.81 KG/M2

## 2024-08-19 DIAGNOSIS — E03.9 ACQUIRED HYPOTHYROIDISM: ICD-10-CM

## 2024-08-19 DIAGNOSIS — Z72.0 TOBACCO ABUSE: ICD-10-CM

## 2024-08-19 DIAGNOSIS — C34.11 MALIGNANT NEOPLASM OF UPPER LOBE OF RIGHT LUNG (HCC): Primary | ICD-10-CM

## 2024-08-19 DIAGNOSIS — C34.90 MALIGNANT NEOPLASM OF LUNG, UNSPECIFIED LATERALITY, UNSPECIFIED PART OF LUNG (HCC): ICD-10-CM

## 2024-08-19 DIAGNOSIS — C77.0 METASTASIS TO CERVICAL LYMPH NODE (HCC): ICD-10-CM

## 2024-08-19 DIAGNOSIS — C77.1 METASTASIS TO MEDIASTINAL LYMPH NODE (HCC): ICD-10-CM

## 2024-08-19 LAB
ALBUMIN SERPL-MCNC: 3.6 G/DL (ref 3.5–5.2)
ALP SERPL-CCNC: 57 U/L (ref 40–129)
ALT SERPL-CCNC: 10 U/L (ref 5–41)
ANION GAP SERPL CALCULATED.3IONS-SCNC: 10 MMOL/L (ref 9–17)
AST SERPL-CCNC: 17 U/L
BASOPHILS # BLD: 0 K/UL (ref 0–0.2)
BASOPHILS NFR BLD: 0 % (ref 0–2)
BILIRUB SERPL-MCNC: 0.2 MG/DL (ref 0.3–1.2)
BUN SERPL-MCNC: 17 MG/DL (ref 8–23)
BUN/CREAT SERPL: 17 (ref 9–20)
CALCIUM SERPL-MCNC: 8.6 MG/DL (ref 8.6–10.4)
CHLORIDE SERPL-SCNC: 104 MMOL/L (ref 98–107)
CO2 SERPL-SCNC: 23 MMOL/L (ref 20–31)
CREAT SERPL-MCNC: 1 MG/DL (ref 0.7–1.2)
EOSINOPHIL # BLD: 0.11 K/UL (ref 0–0.44)
EOSINOPHILS RELATIVE PERCENT: 2 % (ref 1–4)
ERYTHROCYTE [DISTWIDTH] IN BLOOD BY AUTOMATED COUNT: 13.5 % (ref 11.8–14.4)
GFR, ESTIMATED: 85 ML/MIN/1.73M2
GLUCOSE SERPL-MCNC: 127 MG/DL (ref 70–99)
HCT VFR BLD AUTO: 36 % (ref 40.7–50.3)
HGB BLD-MCNC: 12 G/DL (ref 13–17)
IMM GRANULOCYTES # BLD AUTO: 0 K/UL (ref 0–0.3)
IMM GRANULOCYTES NFR BLD: 0 %
LYMPHOCYTES NFR BLD: 0.42 K/UL (ref 1.1–3.7)
LYMPHOCYTES RELATIVE PERCENT: 8 % (ref 24–43)
MCH RBC QN AUTO: 33.3 PG (ref 25.2–33.5)
MCHC RBC AUTO-ENTMCNC: 33.3 G/DL (ref 28.4–34.8)
MCV RBC AUTO: 100 FL (ref 82.6–102.9)
MONOCYTES NFR BLD: 0.69 K/UL (ref 0.1–1.2)
MONOCYTES NFR BLD: 13 % (ref 3–12)
NEUTROPHILS NFR BLD: 77 % (ref 36–65)
NEUTS SEG NFR BLD: 4.08 K/UL (ref 1.5–8.1)
NRBC BLD-RTO: 0 PER 100 WBC
PLATELET # BLD AUTO: 293 K/UL (ref 138–453)
PMV BLD AUTO: 9.2 FL (ref 8.1–13.5)
POTASSIUM SERPL-SCNC: 4.1 MMOL/L (ref 3.7–5.3)
PROT SERPL-MCNC: 6.9 G/DL (ref 6.4–8.3)
RBC # BLD AUTO: 3.6 M/UL (ref 4.21–5.77)
SODIUM SERPL-SCNC: 137 MMOL/L (ref 135–144)
TSH SERPL DL<=0.05 MIU/L-ACNC: 4.68 UIU/ML (ref 0.3–5)
WBC OTHER # BLD: 5.3 K/UL (ref 3.5–11.3)

## 2024-08-19 PROCEDURE — 96409 CHEMO IV PUSH SNGL DRUG: CPT

## 2024-08-19 PROCEDURE — 2580000003 HC RX 258: Performed by: INTERNAL MEDICINE

## 2024-08-19 PROCEDURE — 99214 OFFICE O/P EST MOD 30 MIN: CPT | Performed by: INTERNAL MEDICINE

## 2024-08-19 PROCEDURE — 80053 COMPREHEN METABOLIC PANEL: CPT

## 2024-08-19 PROCEDURE — 84443 ASSAY THYROID STIM HORMONE: CPT

## 2024-08-19 PROCEDURE — 99211 OFF/OP EST MAY X REQ PHY/QHP: CPT

## 2024-08-19 PROCEDURE — 85025 COMPLETE CBC W/AUTO DIFF WBC: CPT

## 2024-08-19 PROCEDURE — 36591 DRAW BLOOD OFF VENOUS DEVICE: CPT

## 2024-08-19 PROCEDURE — 96375 TX/PRO/DX INJ NEW DRUG ADDON: CPT

## 2024-08-19 PROCEDURE — 6360000002 HC RX W HCPCS: Performed by: INTERNAL MEDICINE

## 2024-08-19 RX ORDER — HEPARIN 100 UNIT/ML
500 SYRINGE INTRAVENOUS PRN
Status: DISCONTINUED | OUTPATIENT
Start: 2024-08-19 | End: 2024-08-20 | Stop reason: HOSPADM

## 2024-08-19 RX ORDER — SODIUM CHLORIDE 0.9 % (FLUSH) 0.9 %
5-40 SYRINGE (ML) INJECTION PRN
Status: DISCONTINUED | OUTPATIENT
Start: 2024-08-19 | End: 2024-08-20 | Stop reason: HOSPADM

## 2024-08-19 RX ORDER — ONDANSETRON HYDROCHLORIDE 8 MG/1
8 TABLET, FILM COATED ORAL EVERY 8 HOURS PRN
Qty: 30 TABLET | Refills: 2 | Status: SHIPPED | OUTPATIENT
Start: 2024-08-19

## 2024-08-19 RX ORDER — BETAMETHASONE DIPROPIONATE 0.5 MG/G
CREAM TOPICAL
Qty: 45 G | Refills: 2 | Status: SHIPPED | OUTPATIENT
Start: 2024-08-19

## 2024-08-19 RX ORDER — LIDOCAINE AND PRILOCAINE 25; 25 MG/G; MG/G
CREAM TOPICAL
Qty: 30 G | Refills: 2 | Status: SHIPPED | OUTPATIENT
Start: 2024-08-19

## 2024-08-19 RX ORDER — SODIUM CHLORIDE 9 MG/ML
5-250 INJECTION, SOLUTION INTRAVENOUS PRN
Status: DISCONTINUED | OUTPATIENT
Start: 2024-08-19 | End: 2024-08-20 | Stop reason: HOSPADM

## 2024-08-19 RX ORDER — DEXAMETHASONE SODIUM PHOSPHATE 10 MG/ML
8 INJECTION INTRAMUSCULAR; INTRAVENOUS ONCE
Status: COMPLETED | OUTPATIENT
Start: 2024-08-19 | End: 2024-08-19

## 2024-08-19 RX ORDER — BUPROPION HYDROCHLORIDE 150 MG/1
150 TABLET ORAL EVERY MORNING
Qty: 30 TABLET | Refills: 3 | Status: SHIPPED | OUTPATIENT
Start: 2024-08-19

## 2024-08-19 RX ADMIN — DEXAMETHASONE SODIUM PHOSPHATE 8 MG: 10 INJECTION INTRAMUSCULAR; INTRAVENOUS at 12:26

## 2024-08-19 RX ADMIN — SODIUM CHLORIDE, PRESERVATIVE FREE 40 ML: 5 INJECTION INTRAVENOUS at 11:20

## 2024-08-19 RX ADMIN — PEMETREXED DISODIUM 1000 MG: 500 INJECTION, POWDER, LYOPHILIZED, FOR SOLUTION INTRAVENOUS at 12:49

## 2024-08-19 RX ADMIN — SODIUM CHLORIDE 50 ML/HR: 9 INJECTION, SOLUTION INTRAVENOUS at 11:24

## 2024-08-19 RX ADMIN — HEPARIN 500 UNITS: 100 SYRINGE at 13:10

## 2024-08-19 RX ADMIN — SODIUM CHLORIDE, PRESERVATIVE FREE 20 ML: 5 INJECTION INTRAVENOUS at 13:10

## 2024-08-19 NOTE — TELEPHONE ENCOUNTER
FRANCO HERE FOR FOLLOW UP & TX   NO INSTRUCTIONS GIVEN   PER DR. LERNER NOTE HE WILL SEE PT BACK IN 3 WKS   MD VISIT: 9/9/24 @ 11:30AM TX @ 11AM   AVS PRINTED AND GIVEN ON EXIT

## 2024-08-19 NOTE — PROGRESS NOTES
Pt arrives per amb with wife and labs and orders reviewed and pt seen per md and NS flushing line before and after premeds and chemo and pt given AVS from  and pt discharged per amb with wife. No reactions or complaints and blood return present throughout infusion.

## 2024-09-05 ENCOUNTER — HOSPITAL ENCOUNTER (OUTPATIENT)
Facility: MEDICAL CENTER | Age: 64
End: 2024-09-05
Payer: COMMERCIAL

## 2024-09-09 ENCOUNTER — TELEPHONE (OUTPATIENT)
Dept: ONCOLOGY | Age: 64
End: 2024-09-09

## 2024-09-09 ENCOUNTER — OFFICE VISIT (OUTPATIENT)
Dept: ONCOLOGY | Age: 64
End: 2024-09-09
Payer: COMMERCIAL

## 2024-09-09 ENCOUNTER — HOSPITAL ENCOUNTER (OUTPATIENT)
Dept: INFUSION THERAPY | Facility: MEDICAL CENTER | Age: 64
Discharge: HOME | End: 2024-09-09
Payer: COMMERCIAL

## 2024-09-09 VITALS
WEIGHT: 164.2 LBS | DIASTOLIC BLOOD PRESSURE: 74 MMHG | BODY MASS INDEX: 20.52 KG/M2 | HEART RATE: 90 BPM | TEMPERATURE: 98.1 F | SYSTOLIC BLOOD PRESSURE: 113 MMHG

## 2024-09-09 DIAGNOSIS — C34.90 MALIGNANT NEOPLASM OF LUNG, UNSPECIFIED LATERALITY, UNSPECIFIED PART OF LUNG (HCC): ICD-10-CM

## 2024-09-09 DIAGNOSIS — C77.0 METASTASIS TO CERVICAL LYMPH NODE (HCC): ICD-10-CM

## 2024-09-09 DIAGNOSIS — C34.11 MALIGNANT NEOPLASM OF UPPER LOBE OF RIGHT LUNG (HCC): ICD-10-CM

## 2024-09-09 DIAGNOSIS — C34.11 MALIGNANT NEOPLASM OF UPPER LOBE OF RIGHT LUNG (HCC): Primary | ICD-10-CM

## 2024-09-09 DIAGNOSIS — C77.1 METASTASIS TO MEDIASTINAL LYMPH NODE (HCC): ICD-10-CM

## 2024-09-09 DIAGNOSIS — E03.9 ACQUIRED HYPOTHYROIDISM: ICD-10-CM

## 2024-09-09 LAB
ALBUMIN SERPL-MCNC: 3.5 G/DL (ref 3.5–5.2)
ALP SERPL-CCNC: 61 U/L (ref 40–129)
ALT SERPL-CCNC: 11 U/L (ref 5–41)
ANION GAP SERPL CALCULATED.3IONS-SCNC: 12 MMOL/L (ref 9–17)
AST SERPL-CCNC: 18 U/L
BASOPHILS # BLD: 0 K/UL (ref 0–0.2)
BASOPHILS NFR BLD: 0 %
BILIRUB SERPL-MCNC: 0.2 MG/DL (ref 0.3–1.2)
BUN SERPL-MCNC: 21 MG/DL (ref 8–23)
BUN/CREAT SERPL: 21 (ref 9–20)
CALCIUM SERPL-MCNC: 8.7 MG/DL (ref 8.6–10.4)
CHLORIDE SERPL-SCNC: 102 MMOL/L (ref 98–107)
CO2 SERPL-SCNC: 22 MMOL/L (ref 20–31)
CREAT SERPL-MCNC: 1 MG/DL (ref 0.7–1.2)
EOSINOPHIL # BLD: 0.07 K/UL (ref 0–0.4)
EOSINOPHILS RELATIVE PERCENT: 1 % (ref 1–4)
ERYTHROCYTE [DISTWIDTH] IN BLOOD BY AUTOMATED COUNT: 14 % (ref 11.8–14.4)
GFR, ESTIMATED: 85 ML/MIN/1.73M2
GLUCOSE SERPL-MCNC: 106 MG/DL (ref 70–99)
HCT VFR BLD AUTO: 33.1 % (ref 40.7–50.3)
HGB BLD-MCNC: 11 G/DL (ref 13–17)
IMM GRANULOCYTES # BLD AUTO: 0 K/UL (ref 0–0.3)
IMM GRANULOCYTES NFR BLD: 0 %
LYMPHOCYTES NFR BLD: 0.58 K/UL (ref 1–4.8)
LYMPHOCYTES RELATIVE PERCENT: 8 % (ref 24–44)
MCH RBC QN AUTO: 32.4 PG (ref 25.2–33.5)
MCHC RBC AUTO-ENTMCNC: 33.2 G/DL (ref 28.4–34.8)
MCV RBC AUTO: 97.4 FL (ref 82.6–102.9)
MONOCYTES NFR BLD: 0.86 K/UL (ref 0.2–0.8)
MONOCYTES NFR BLD: 12 % (ref 1–7)
NEUTROPHILS NFR BLD: 79 % (ref 36–66)
NEUTS SEG NFR BLD: 5.69 K/UL (ref 1.8–7.7)
NRBC BLD-RTO: 0 PER 100 WBC
PLATELET # BLD AUTO: 425 K/UL (ref 138–453)
PMV BLD AUTO: 9 FL (ref 8.1–13.5)
POTASSIUM SERPL-SCNC: 4.3 MMOL/L (ref 3.7–5.3)
PROT SERPL-MCNC: 6.5 G/DL (ref 6.4–8.3)
RBC # BLD AUTO: 3.4 M/UL (ref 4.21–5.77)
SODIUM SERPL-SCNC: 136 MMOL/L (ref 135–144)
TSH SERPL DL<=0.05 MIU/L-ACNC: 2.76 UIU/ML (ref 0.3–5)
WBC OTHER # BLD: 7.2 K/UL (ref 3.5–11.3)

## 2024-09-09 PROCEDURE — 96409 CHEMO IV PUSH SNGL DRUG: CPT

## 2024-09-09 PROCEDURE — 84443 ASSAY THYROID STIM HORMONE: CPT

## 2024-09-09 PROCEDURE — 96375 TX/PRO/DX INJ NEW DRUG ADDON: CPT

## 2024-09-09 PROCEDURE — 99214 OFFICE O/P EST MOD 30 MIN: CPT | Performed by: INTERNAL MEDICINE

## 2024-09-09 PROCEDURE — 36591 DRAW BLOOD OFF VENOUS DEVICE: CPT

## 2024-09-09 PROCEDURE — 80053 COMPREHEN METABOLIC PANEL: CPT

## 2024-09-09 PROCEDURE — 99211 OFF/OP EST MAY X REQ PHY/QHP: CPT

## 2024-09-09 PROCEDURE — 96413 CHEMO IV INFUSION 1 HR: CPT

## 2024-09-09 PROCEDURE — 2580000003 HC RX 258: Performed by: INTERNAL MEDICINE

## 2024-09-09 PROCEDURE — 85025 COMPLETE CBC W/AUTO DIFF WBC: CPT

## 2024-09-09 PROCEDURE — 6360000002 HC RX W HCPCS: Performed by: INTERNAL MEDICINE

## 2024-09-09 RX ORDER — SODIUM CHLORIDE 0.9 % (FLUSH) 0.9 %
5-40 SYRINGE (ML) INJECTION PRN
Status: DISCONTINUED | OUTPATIENT
Start: 2024-09-09 | End: 2024-09-10 | Stop reason: HOSPADM

## 2024-09-09 RX ORDER — SODIUM CHLORIDE 9 MG/ML
5-250 INJECTION, SOLUTION INTRAVENOUS PRN
Status: DISCONTINUED | OUTPATIENT
Start: 2024-09-09 | End: 2024-09-10 | Stop reason: HOSPADM

## 2024-09-09 RX ORDER — DEXAMETHASONE 2 MG/1
TABLET ORAL
Qty: 15 TABLET | Refills: 0 | Status: SHIPPED | OUTPATIENT
Start: 2024-09-09

## 2024-09-09 RX ORDER — DEXAMETHASONE SODIUM PHOSPHATE 10 MG/ML
8 INJECTION INTRAMUSCULAR; INTRAVENOUS ONCE
Status: COMPLETED | OUTPATIENT
Start: 2024-09-09 | End: 2024-09-09

## 2024-09-09 RX ORDER — HEPARIN 100 UNIT/ML
500 SYRINGE INTRAVENOUS PRN
Status: DISCONTINUED | OUTPATIENT
Start: 2024-09-09 | End: 2024-09-10 | Stop reason: HOSPADM

## 2024-09-09 RX ADMIN — DEXAMETHASONE SODIUM PHOSPHATE 8 MG: 10 INJECTION INTRAMUSCULAR; INTRAVENOUS at 12:35

## 2024-09-09 RX ADMIN — PEMETREXED DISODIUM 1000 MG: 500 INJECTION, POWDER, LYOPHILIZED, FOR SOLUTION INTRAVENOUS at 13:06

## 2024-09-09 RX ADMIN — HEPARIN 500 UNITS: 100 SYRINGE at 13:28

## 2024-09-09 RX ADMIN — SODIUM CHLORIDE 100 ML/HR: 9 INJECTION, SOLUTION INTRAVENOUS at 11:11

## 2024-09-09 RX ADMIN — SODIUM CHLORIDE, PRESERVATIVE FREE 10 ML: 5 INJECTION INTRAVENOUS at 11:11

## 2024-09-09 RX ADMIN — SODIUM CHLORIDE, PRESERVATIVE FREE 10 ML: 5 INJECTION INTRAVENOUS at 13:28

## 2024-09-21 ENCOUNTER — APPOINTMENT (OUTPATIENT)
Dept: CT IMAGING | Age: 64
DRG: 389 | End: 2024-09-21
Payer: COMMERCIAL

## 2024-09-21 ENCOUNTER — HOSPITAL ENCOUNTER (INPATIENT)
Age: 64
LOS: 2 days | Discharge: HOME | DRG: 389 | End: 2024-09-23
Attending: EMERGENCY MEDICINE | Admitting: HOSPITALIST
Payer: COMMERCIAL

## 2024-09-21 DIAGNOSIS — K56.609 SMALL BOWEL OBSTRUCTION (HCC): Primary | ICD-10-CM

## 2024-09-21 PROBLEM — K56.600 SMALL BOWEL OBSTRUCTION, PARTIAL (HCC): Status: ACTIVE | Noted: 2024-09-21

## 2024-09-21 LAB
ALBUMIN SERPL-MCNC: 3.6 G/DL (ref 3.5–5.2)
ALBUMIN/GLOB SERPL: 1 {RATIO} (ref 1–2.5)
ALP SERPL-CCNC: 65 U/L (ref 40–129)
ALT SERPL-CCNC: 10 U/L (ref 10–50)
ANION GAP SERPL CALCULATED.3IONS-SCNC: 13 MMOL/L (ref 9–16)
AST SERPL-CCNC: 25 U/L (ref 10–50)
BASOPHILS # BLD: 0 K/UL (ref 0–0.2)
BASOPHILS NFR BLD: 0 % (ref 0–2)
BILIRUB SERPL-MCNC: 0.3 MG/DL (ref 0–1.2)
BILIRUB UR QL STRIP: NEGATIVE
BUN SERPL-MCNC: 13 MG/DL (ref 8–23)
CALCIUM SERPL-MCNC: 8.7 MG/DL (ref 8.6–10.4)
CHLORIDE SERPL-SCNC: 98 MMOL/L (ref 98–107)
CLARITY UR: CLEAR
CO2 SERPL-SCNC: 24 MMOL/L (ref 20–31)
COLOR UR: YELLOW
COMMENT: ABNORMAL
CREAT SERPL-MCNC: 0.9 MG/DL (ref 0.7–1.2)
EOSINOPHIL # BLD: 0.25 K/UL (ref 0–0.4)
EOSINOPHILS RELATIVE PERCENT: 5 % (ref 1–4)
ERYTHROCYTE [DISTWIDTH] IN BLOOD BY AUTOMATED COUNT: 13.7 % (ref 11.8–14.4)
GFR, ESTIMATED: >90 ML/MIN/1.73M2
GLUCOSE BLD-MCNC: 97 MG/DL (ref 75–110)
GLUCOSE SERPL-MCNC: 102 MG/DL (ref 74–99)
GLUCOSE UR STRIP-MCNC: NEGATIVE MG/DL
HCT VFR BLD AUTO: 35.1 % (ref 40.7–50.3)
HGB BLD-MCNC: 11.4 G/DL (ref 13–17)
HGB UR QL STRIP.AUTO: NEGATIVE
IMM GRANULOCYTES # BLD AUTO: 0 K/UL (ref 0–0.3)
IMM GRANULOCYTES NFR BLD: 0 %
KETONES UR STRIP-MCNC: ABNORMAL MG/DL
LACTIC ACID, WHOLE BLOOD: 0.9 MMOL/L (ref 0.7–2.1)
LEUKOCYTE ESTERASE UR QL STRIP: NEGATIVE
LIPASE SERPL-CCNC: 18 U/L (ref 13–60)
LYMPHOCYTES NFR BLD: 0.5 K/UL (ref 1–4.8)
LYMPHOCYTES RELATIVE PERCENT: 10 % (ref 24–44)
MCH RBC QN AUTO: 31.2 PG (ref 25.2–33.5)
MCHC RBC AUTO-ENTMCNC: 32.5 G/DL (ref 28.4–34.8)
MCV RBC AUTO: 96.2 FL (ref 82.6–102.9)
MONOCYTES NFR BLD: 0.85 K/UL (ref 0.1–0.8)
MONOCYTES NFR BLD: 17 % (ref 1–7)
MORPHOLOGY: NORMAL
NEUTROPHILS NFR BLD: 68 % (ref 36–66)
NEUTS SEG NFR BLD: 3.4 K/UL (ref 1.8–7.7)
NITRITE UR QL STRIP: NEGATIVE
NRBC BLD-RTO: 0 PER 100 WBC
PH UR STRIP: 6 [PH] (ref 5–8)
PLATELET # BLD AUTO: 181 K/UL (ref 138–453)
PMV BLD AUTO: 10.4 FL (ref 8.1–13.5)
POTASSIUM SERPL-SCNC: 3.9 MMOL/L (ref 3.7–5.3)
PROT SERPL-MCNC: 6.8 G/DL (ref 6.6–8.7)
PROT UR STRIP-MCNC: NEGATIVE MG/DL
RBC # BLD AUTO: 3.65 M/UL (ref 4.21–5.77)
SARS-COV-2 RDRP RESP QL NAA+PROBE: NOT DETECTED
SODIUM SERPL-SCNC: 135 MMOL/L (ref 136–145)
SP GR UR STRIP: 1.01 (ref 1–1.03)
SPECIMEN DESCRIPTION: NORMAL
UROBILINOGEN UR STRIP-ACNC: NORMAL EU/DL (ref 0–1)
WBC OTHER # BLD: 5 K/UL (ref 3.5–11.3)

## 2024-09-21 PROCEDURE — 85025 COMPLETE CBC W/AUTO DIFF WBC: CPT

## 2024-09-21 PROCEDURE — 81003 URINALYSIS AUTO W/O SCOPE: CPT

## 2024-09-21 PROCEDURE — 80053 COMPREHEN METABOLIC PANEL: CPT

## 2024-09-21 PROCEDURE — 6360000004 HC RX CONTRAST MEDICATION: Performed by: EMERGENCY MEDICINE

## 2024-09-21 PROCEDURE — 1200000000 HC SEMI PRIVATE

## 2024-09-21 PROCEDURE — 2580000003 HC RX 258

## 2024-09-21 PROCEDURE — 74177 CT ABD & PELVIS W/CONTRAST: CPT

## 2024-09-21 PROCEDURE — 6360000004 HC RX CONTRAST MEDICATION

## 2024-09-21 PROCEDURE — 96374 THER/PROPH/DIAG INJ IV PUSH: CPT

## 2024-09-21 PROCEDURE — 82947 ASSAY GLUCOSE BLOOD QUANT: CPT

## 2024-09-21 PROCEDURE — 87635 SARS-COV-2 COVID-19 AMP PRB: CPT

## 2024-09-21 PROCEDURE — 99285 EMERGENCY DEPT VISIT HI MDM: CPT

## 2024-09-21 PROCEDURE — 74176 CT ABD & PELVIS W/O CONTRAST: CPT

## 2024-09-21 PROCEDURE — 2500000003 HC RX 250 WO HCPCS

## 2024-09-21 PROCEDURE — 99223 1ST HOSP IP/OBS HIGH 75: CPT | Performed by: HOSPITALIST

## 2024-09-21 PROCEDURE — 83690 ASSAY OF LIPASE: CPT

## 2024-09-21 PROCEDURE — 99253 IP/OBS CNSLTJ NEW/EST LOW 45: CPT | Performed by: SURGERY

## 2024-09-21 PROCEDURE — 83605 ASSAY OF LACTIC ACID: CPT

## 2024-09-21 RX ORDER — ACETAMINOPHEN 650 MG/1
650 SUPPOSITORY RECTAL EVERY 6 HOURS PRN
Status: DISCONTINUED | OUTPATIENT
Start: 2024-09-21 | End: 2024-09-23 | Stop reason: HOSPADM

## 2024-09-21 RX ORDER — 0.9 % SODIUM CHLORIDE 0.9 %
1000 INTRAVENOUS SOLUTION INTRAVENOUS ONCE
Status: COMPLETED | OUTPATIENT
Start: 2024-09-21 | End: 2024-09-21

## 2024-09-21 RX ORDER — LEVOTHYROXINE SODIUM 75 UG/1
75 TABLET ORAL DAILY
Status: DISCONTINUED | OUTPATIENT
Start: 2024-09-22 | End: 2024-09-23 | Stop reason: HOSPADM

## 2024-09-21 RX ORDER — PANTOPRAZOLE SODIUM 40 MG/1
40 TABLET, DELAYED RELEASE ORAL
Status: DISCONTINUED | OUTPATIENT
Start: 2024-09-22 | End: 2024-09-23 | Stop reason: HOSPADM

## 2024-09-21 RX ORDER — POTASSIUM CHLORIDE 1500 MG/1
40 TABLET, EXTENDED RELEASE ORAL PRN
Status: DISCONTINUED | OUTPATIENT
Start: 2024-09-21 | End: 2024-09-23 | Stop reason: HOSPADM

## 2024-09-21 RX ORDER — ONDANSETRON 4 MG/1
4 TABLET, ORALLY DISINTEGRATING ORAL EVERY 8 HOURS PRN
Status: DISCONTINUED | OUTPATIENT
Start: 2024-09-21 | End: 2024-09-21

## 2024-09-21 RX ORDER — GLUCAGON 1 MG/ML
1 KIT INJECTION PRN
Status: DISCONTINUED | OUTPATIENT
Start: 2024-09-21 | End: 2024-09-23 | Stop reason: HOSPADM

## 2024-09-21 RX ORDER — POTASSIUM CHLORIDE 7.45 MG/ML
10 INJECTION INTRAVENOUS PRN
Status: DISCONTINUED | OUTPATIENT
Start: 2024-09-21 | End: 2024-09-23 | Stop reason: HOSPADM

## 2024-09-21 RX ORDER — MAGNESIUM SULFATE IN WATER 40 MG/ML
2000 INJECTION, SOLUTION INTRAVENOUS PRN
Status: DISCONTINUED | OUTPATIENT
Start: 2024-09-21 | End: 2024-09-23 | Stop reason: HOSPADM

## 2024-09-21 RX ORDER — SODIUM CHLORIDE 0.9 % (FLUSH) 0.9 %
5-40 SYRINGE (ML) INJECTION EVERY 12 HOURS SCHEDULED
Status: DISCONTINUED | OUTPATIENT
Start: 2024-09-21 | End: 2024-09-21

## 2024-09-21 RX ORDER — ACETAMINOPHEN 325 MG/1
650 TABLET ORAL EVERY 6 HOURS PRN
Status: DISCONTINUED | OUTPATIENT
Start: 2024-09-21 | End: 2024-09-23 | Stop reason: HOSPADM

## 2024-09-21 RX ORDER — SODIUM CHLORIDE 9 MG/ML
INJECTION, SOLUTION INTRAVENOUS PRN
Status: DISCONTINUED | OUTPATIENT
Start: 2024-09-21 | End: 2024-09-21

## 2024-09-21 RX ORDER — IOPAMIDOL 755 MG/ML
75 INJECTION, SOLUTION INTRAVASCULAR
Status: COMPLETED | OUTPATIENT
Start: 2024-09-21 | End: 2024-09-21

## 2024-09-21 RX ORDER — SODIUM CHLORIDE 9 MG/ML
INJECTION, SOLUTION INTRAVENOUS PRN
Status: DISCONTINUED | OUTPATIENT
Start: 2024-09-21 | End: 2024-09-23 | Stop reason: HOSPADM

## 2024-09-21 RX ORDER — MAGNESIUM SULFATE IN WATER 40 MG/ML
2000 INJECTION, SOLUTION INTRAVENOUS PRN
Status: DISCONTINUED | OUTPATIENT
Start: 2024-09-21 | End: 2024-09-21

## 2024-09-21 RX ORDER — ONDANSETRON 4 MG/1
4 TABLET, ORALLY DISINTEGRATING ORAL EVERY 8 HOURS PRN
Status: DISCONTINUED | OUTPATIENT
Start: 2024-09-21 | End: 2024-09-23 | Stop reason: HOSPADM

## 2024-09-21 RX ORDER — ACETAMINOPHEN 650 MG/1
650 SUPPOSITORY RECTAL EVERY 6 HOURS PRN
Status: DISCONTINUED | OUTPATIENT
Start: 2024-09-21 | End: 2024-09-21

## 2024-09-21 RX ORDER — DEXTROSE MONOHYDRATE 100 MG/ML
INJECTION, SOLUTION INTRAVENOUS CONTINUOUS PRN
Status: DISCONTINUED | OUTPATIENT
Start: 2024-09-21 | End: 2024-09-23 | Stop reason: HOSPADM

## 2024-09-21 RX ORDER — POLYETHYLENE GLYCOL 3350 17 G/17G
17 POWDER, FOR SOLUTION ORAL DAILY PRN
Status: DISCONTINUED | OUTPATIENT
Start: 2024-09-21 | End: 2024-09-21

## 2024-09-21 RX ORDER — LANOLIN ALCOHOL/MO/W.PET/CERES
3 CREAM (GRAM) TOPICAL NIGHTLY PRN
Status: DISCONTINUED | OUTPATIENT
Start: 2024-09-21 | End: 2024-09-23 | Stop reason: HOSPADM

## 2024-09-21 RX ORDER — SODIUM CHLORIDE, SODIUM LACTATE, POTASSIUM CHLORIDE, CALCIUM CHLORIDE 600; 310; 30; 20 MG/100ML; MG/100ML; MG/100ML; MG/100ML
INJECTION, SOLUTION INTRAVENOUS CONTINUOUS
Status: DISCONTINUED | OUTPATIENT
Start: 2024-09-21 | End: 2024-09-21

## 2024-09-21 RX ORDER — SODIUM CHLORIDE 0.9 % (FLUSH) 0.9 %
5-40 SYRINGE (ML) INJECTION EVERY 12 HOURS SCHEDULED
Status: DISCONTINUED | OUTPATIENT
Start: 2024-09-21 | End: 2024-09-23 | Stop reason: HOSPADM

## 2024-09-21 RX ORDER — ONDANSETRON 2 MG/ML
4 INJECTION INTRAMUSCULAR; INTRAVENOUS EVERY 6 HOURS PRN
Status: DISCONTINUED | OUTPATIENT
Start: 2024-09-21 | End: 2024-09-21

## 2024-09-21 RX ORDER — BUPROPION HYDROCHLORIDE 150 MG/1
150 TABLET ORAL EVERY MORNING
Status: DISCONTINUED | OUTPATIENT
Start: 2024-09-22 | End: 2024-09-23 | Stop reason: HOSPADM

## 2024-09-21 RX ORDER — SODIUM CHLORIDE 0.9 % (FLUSH) 0.9 %
5-40 SYRINGE (ML) INJECTION PRN
Status: DISCONTINUED | OUTPATIENT
Start: 2024-09-21 | End: 2024-09-23 | Stop reason: HOSPADM

## 2024-09-21 RX ORDER — POTASSIUM CHLORIDE 7.45 MG/ML
10 INJECTION INTRAVENOUS PRN
Status: DISCONTINUED | OUTPATIENT
Start: 2024-09-21 | End: 2024-09-21

## 2024-09-21 RX ORDER — SODIUM CHLORIDE, SODIUM LACTATE, POTASSIUM CHLORIDE, CALCIUM CHLORIDE 600; 310; 30; 20 MG/100ML; MG/100ML; MG/100ML; MG/100ML
INJECTION, SOLUTION INTRAVENOUS CONTINUOUS
Status: DISCONTINUED | OUTPATIENT
Start: 2024-09-21 | End: 2024-09-23 | Stop reason: HOSPADM

## 2024-09-21 RX ORDER — POTASSIUM CHLORIDE 1500 MG/1
40 TABLET, EXTENDED RELEASE ORAL PRN
Status: DISCONTINUED | OUTPATIENT
Start: 2024-09-21 | End: 2024-09-21

## 2024-09-21 RX ORDER — POLYETHYLENE GLYCOL 3350 17 G/17G
17 POWDER, FOR SOLUTION ORAL DAILY PRN
Status: DISCONTINUED | OUTPATIENT
Start: 2024-09-21 | End: 2024-09-23 | Stop reason: HOSPADM

## 2024-09-21 RX ORDER — ONDANSETRON 2 MG/ML
4 INJECTION INTRAMUSCULAR; INTRAVENOUS EVERY 6 HOURS PRN
Status: DISCONTINUED | OUTPATIENT
Start: 2024-09-21 | End: 2024-09-23 | Stop reason: HOSPADM

## 2024-09-21 RX ORDER — ACETAMINOPHEN 325 MG/1
650 TABLET ORAL EVERY 6 HOURS PRN
Status: DISCONTINUED | OUTPATIENT
Start: 2024-09-21 | End: 2024-09-21

## 2024-09-21 RX ORDER — SODIUM CHLORIDE 0.9 % (FLUSH) 0.9 %
5-40 SYRINGE (ML) INJECTION PRN
Status: DISCONTINUED | OUTPATIENT
Start: 2024-09-21 | End: 2024-09-21

## 2024-09-21 RX ORDER — FOLIC ACID 1 MG/1
1 TABLET ORAL DAILY
Status: DISCONTINUED | OUTPATIENT
Start: 2024-09-22 | End: 2024-09-23 | Stop reason: HOSPADM

## 2024-09-21 RX ADMIN — SODIUM CHLORIDE, POTASSIUM CHLORIDE, SODIUM LACTATE AND CALCIUM CHLORIDE: 600; 310; 30; 20 INJECTION, SOLUTION INTRAVENOUS at 17:51

## 2024-09-21 RX ADMIN — FAMOTIDINE 20 MG: 10 INJECTION, SOLUTION INTRAVENOUS at 15:26

## 2024-09-21 RX ADMIN — SODIUM CHLORIDE 1000 ML: 9 INJECTION, SOLUTION INTRAVENOUS at 15:25

## 2024-09-21 RX ADMIN — IOHEXOL 50 ML: 240 INJECTION, SOLUTION INTRATHECAL; INTRAVASCULAR; INTRAVENOUS; ORAL at 17:46

## 2024-09-21 RX ADMIN — IOPAMIDOL 75 ML: 755 INJECTION, SOLUTION INTRAVENOUS at 14:15

## 2024-09-21 ASSESSMENT — PAIN SCALES - GENERAL
PAINLEVEL_OUTOF10: 10
PAINLEVEL_OUTOF10: 0
PAINLEVEL_OUTOF10: 0

## 2024-09-21 ASSESSMENT — PAIN - FUNCTIONAL ASSESSMENT: PAIN_FUNCTIONAL_ASSESSMENT: 0-10

## 2024-09-22 ENCOUNTER — APPOINTMENT (OUTPATIENT)
Dept: GENERAL RADIOLOGY | Age: 64
DRG: 389 | End: 2024-09-22
Payer: COMMERCIAL

## 2024-09-22 LAB
ANION GAP SERPL CALCULATED.3IONS-SCNC: 12 MMOL/L (ref 9–16)
BASOPHILS # BLD: 0 K/UL (ref 0–0.2)
BASOPHILS NFR BLD: 0 % (ref 0–2)
BUN SERPL-MCNC: 9 MG/DL (ref 8–23)
CALCIUM SERPL-MCNC: 8 MG/DL (ref 8.6–10.4)
CHLORIDE SERPL-SCNC: 99 MMOL/L (ref 98–107)
CO2 SERPL-SCNC: 23 MMOL/L (ref 20–31)
CREAT SERPL-MCNC: 0.9 MG/DL (ref 0.7–1.2)
EOSINOPHIL # BLD: 0.11 K/UL (ref 0–0.4)
EOSINOPHILS RELATIVE PERCENT: 2 % (ref 1–4)
ERYTHROCYTE [DISTWIDTH] IN BLOOD BY AUTOMATED COUNT: 14 % (ref 11.8–14.4)
GFR, ESTIMATED: >90 ML/MIN/1.73M2
GLUCOSE SERPL-MCNC: 81 MG/DL (ref 74–99)
HCT VFR BLD AUTO: 32.5 % (ref 40.7–50.3)
HGB BLD-MCNC: 10.4 G/DL (ref 13–17)
IMM GRANULOCYTES # BLD AUTO: 0 K/UL (ref 0–0.3)
IMM GRANULOCYTES NFR BLD: 0 %
LYMPHOCYTES NFR BLD: 0.28 K/UL (ref 1–4.8)
LYMPHOCYTES RELATIVE PERCENT: 5 % (ref 24–44)
MCH RBC QN AUTO: 30.7 PG (ref 25.2–33.5)
MCHC RBC AUTO-ENTMCNC: 32 G/DL (ref 28.4–34.8)
MCV RBC AUTO: 95.9 FL (ref 82.6–102.9)
MONOCYTES NFR BLD: 0.9 K/UL (ref 0.1–0.8)
MONOCYTES NFR BLD: 16 % (ref 1–7)
MORPHOLOGY: NORMAL
NEUTROPHILS NFR BLD: 77 % (ref 36–66)
NEUTS SEG NFR BLD: 4.31 K/UL (ref 1.8–7.7)
NRBC BLD-RTO: 0 PER 100 WBC
PLATELET # BLD AUTO: 188 K/UL (ref 138–453)
PMV BLD AUTO: 10.4 FL (ref 8.1–13.5)
POTASSIUM SERPL-SCNC: 4.1 MMOL/L (ref 3.7–5.3)
RBC # BLD AUTO: 3.39 M/UL (ref 4.21–5.77)
SODIUM SERPL-SCNC: 134 MMOL/L (ref 136–145)
WBC OTHER # BLD: 5.6 K/UL (ref 3.5–11.3)

## 2024-09-22 PROCEDURE — 80048 BASIC METABOLIC PNL TOTAL CA: CPT

## 2024-09-22 PROCEDURE — 74018 RADEX ABDOMEN 1 VIEW: CPT

## 2024-09-22 PROCEDURE — 6370000000 HC RX 637 (ALT 250 FOR IP)

## 2024-09-22 PROCEDURE — 85025 COMPLETE CBC W/AUTO DIFF WBC: CPT

## 2024-09-22 PROCEDURE — 99233 SBSQ HOSP IP/OBS HIGH 50: CPT | Performed by: HOSPITALIST

## 2024-09-22 PROCEDURE — 36415 COLL VENOUS BLD VENIPUNCTURE: CPT

## 2024-09-22 PROCEDURE — 99231 SBSQ HOSP IP/OBS SF/LOW 25: CPT | Performed by: SURGERY

## 2024-09-22 PROCEDURE — 1200000000 HC SEMI PRIVATE

## 2024-09-22 PROCEDURE — 2580000003 HC RX 258

## 2024-09-22 RX ADMIN — APIXABAN 5 MG: 5 TABLET, FILM COATED ORAL at 21:00

## 2024-09-22 RX ADMIN — SODIUM CHLORIDE, PRESERVATIVE FREE 10 ML: 5 INJECTION INTRAVENOUS at 08:45

## 2024-09-22 RX ADMIN — ACETAMINOPHEN 650 MG: 325 TABLET ORAL at 07:15

## 2024-09-22 RX ADMIN — APIXABAN 5 MG: 5 TABLET, FILM COATED ORAL at 08:45

## 2024-09-22 RX ADMIN — BUPROPION HYDROCHLORIDE 150 MG: 150 TABLET, EXTENDED RELEASE ORAL at 08:45

## 2024-09-22 RX ADMIN — LEVOTHYROXINE SODIUM 75 MCG: 75 TABLET ORAL at 08:45

## 2024-09-22 RX ADMIN — ACETAMINOPHEN 650 MG: 325 TABLET ORAL at 21:00

## 2024-09-22 RX ADMIN — FOLIC ACID 1 MG: 1 TABLET ORAL at 08:45

## 2024-09-22 ASSESSMENT — PAIN SCALES - GENERAL
PAINLEVEL_OUTOF10: 0
PAINLEVEL_OUTOF10: 3
PAINLEVEL_OUTOF10: 0
PAINLEVEL_OUTOF10: 0

## 2024-09-22 ASSESSMENT — PAIN DESCRIPTION - LOCATION: LOCATION: ABDOMEN

## 2024-09-22 ASSESSMENT — PAIN SCALES - WONG BAKER: WONGBAKER_NUMERICALRESPONSE: NO HURT

## 2024-09-22 ASSESSMENT — PAIN DESCRIPTION - DESCRIPTORS: DESCRIPTORS: ACHING

## 2024-09-22 ASSESSMENT — PAIN DESCRIPTION - ORIENTATION: ORIENTATION: MID;LOWER

## 2024-09-22 ASSESSMENT — PAIN - FUNCTIONAL ASSESSMENT: PAIN_FUNCTIONAL_ASSESSMENT: ACTIVITIES ARE NOT PREVENTED

## 2024-09-23 VITALS
HEIGHT: 75 IN | DIASTOLIC BLOOD PRESSURE: 67 MMHG | BODY MASS INDEX: 20.28 KG/M2 | WEIGHT: 163.14 LBS | SYSTOLIC BLOOD PRESSURE: 93 MMHG | HEART RATE: 85 BPM | RESPIRATION RATE: 18 BRPM | OXYGEN SATURATION: 96 % | TEMPERATURE: 98.5 F

## 2024-09-23 LAB
ANION GAP SERPL CALCULATED.3IONS-SCNC: 11 MMOL/L (ref 9–16)
BASOPHILS # BLD: 0 K/UL (ref 0–0.2)
BASOPHILS NFR BLD: 0 % (ref 0–2)
BUN SERPL-MCNC: 8 MG/DL (ref 8–23)
CALCIUM SERPL-MCNC: 8.2 MG/DL (ref 8.6–10.4)
CHLORIDE SERPL-SCNC: 100 MMOL/L (ref 98–107)
CO2 SERPL-SCNC: 24 MMOL/L (ref 20–31)
CREAT SERPL-MCNC: 0.9 MG/DL (ref 0.7–1.2)
EOSINOPHIL # BLD: 0.2 K/UL (ref 0–0.44)
EOSINOPHILS RELATIVE PERCENT: 3 % (ref 1–4)
ERYTHROCYTE [DISTWIDTH] IN BLOOD BY AUTOMATED COUNT: 14.4 % (ref 11.8–14.4)
GFR, ESTIMATED: >90 ML/MIN/1.73M2
GLUCOSE SERPL-MCNC: 114 MG/DL (ref 74–99)
HCT VFR BLD AUTO: 32.6 % (ref 40.7–50.3)
HGB BLD-MCNC: 10.4 G/DL (ref 13–17)
IMM GRANULOCYTES # BLD AUTO: 0.07 K/UL (ref 0–0.3)
IMM GRANULOCYTES NFR BLD: 1 %
LYMPHOCYTES NFR BLD: 0.33 K/UL (ref 1.1–3.7)
LYMPHOCYTES RELATIVE PERCENT: 5 % (ref 24–43)
MCH RBC QN AUTO: 31.1 PG (ref 25.2–33.5)
MCHC RBC AUTO-ENTMCNC: 31.9 G/DL (ref 28.4–34.8)
MCV RBC AUTO: 97.6 FL (ref 82.6–102.9)
MONOCYTES NFR BLD: 1.11 K/UL (ref 0.1–1.2)
MONOCYTES NFR BLD: 17 % (ref 3–12)
MORPHOLOGY: NORMAL
NEUTROPHILS NFR BLD: 74 % (ref 36–65)
NEUTS SEG NFR BLD: 4.79 K/UL (ref 1.5–8.1)
NRBC BLD-RTO: 0 PER 100 WBC
PLATELET # BLD AUTO: 211 K/UL (ref 138–453)
PMV BLD AUTO: 10 FL (ref 8.1–13.5)
POTASSIUM SERPL-SCNC: 3.8 MMOL/L (ref 3.7–5.3)
RBC # BLD AUTO: 3.34 M/UL (ref 4.21–5.77)
SODIUM SERPL-SCNC: 135 MMOL/L (ref 136–145)
WBC OTHER # BLD: 6.5 K/UL (ref 3.5–11.3)

## 2024-09-23 PROCEDURE — 6370000000 HC RX 637 (ALT 250 FOR IP)

## 2024-09-23 PROCEDURE — 36415 COLL VENOUS BLD VENIPUNCTURE: CPT

## 2024-09-23 PROCEDURE — 85025 COMPLETE CBC W/AUTO DIFF WBC: CPT

## 2024-09-23 PROCEDURE — 80048 BASIC METABOLIC PNL TOTAL CA: CPT

## 2024-09-23 RX ADMIN — LEVOTHYROXINE SODIUM 75 MCG: 75 TABLET ORAL at 08:34

## 2024-09-23 RX ADMIN — APIXABAN 5 MG: 5 TABLET, FILM COATED ORAL at 08:35

## 2024-09-23 RX ADMIN — FOLIC ACID 1 MG: 1 TABLET ORAL at 08:33

## 2024-09-23 RX ADMIN — BUPROPION HYDROCHLORIDE 150 MG: 150 TABLET, EXTENDED RELEASE ORAL at 08:33

## 2024-09-23 ASSESSMENT — PAIN SCALES - GENERAL: PAINLEVEL_OUTOF10: 0

## 2024-09-25 NOTE — TELEPHONE ENCOUNTER
Wife Connie called patient was discharged 9.23.24 from [facility] for small bowel obstruction.  Patient needs a 2 week follow up with Dr. Fragoso.  I was not able to get him in until 10.30.24.  Please call Connie at [phone] anytime.

## 2024-09-30 ENCOUNTER — HOSPITAL ENCOUNTER (OUTPATIENT)
Facility: MEDICAL CENTER | Age: 64
Discharge: HOME | End: 2024-09-30
Payer: COMMERCIAL

## 2024-09-30 ENCOUNTER — TELEPHONE (OUTPATIENT)
Dept: ONCOLOGY | Age: 64
End: 2024-09-30

## 2024-09-30 ENCOUNTER — OFFICE VISIT (OUTPATIENT)
Dept: ONCOLOGY | Age: 64
End: 2024-09-30
Payer: COMMERCIAL

## 2024-09-30 ENCOUNTER — HOSPITAL ENCOUNTER (OUTPATIENT)
Dept: INFUSION THERAPY | Facility: MEDICAL CENTER | Age: 64
Discharge: HOME | End: 2024-09-30
Payer: COMMERCIAL

## 2024-09-30 ENCOUNTER — HOSPITAL ENCOUNTER (OUTPATIENT)
Facility: MEDICAL CENTER | Age: 64
End: 2024-09-30
Payer: COMMERCIAL

## 2024-09-30 VITALS
SYSTOLIC BLOOD PRESSURE: 110 MMHG | WEIGHT: 165.3 LBS | DIASTOLIC BLOOD PRESSURE: 66 MMHG | TEMPERATURE: 98.5 F | HEART RATE: 98 BPM | BODY MASS INDEX: 20.66 KG/M2

## 2024-09-30 DIAGNOSIS — C34.11 MALIGNANT NEOPLASM OF UPPER LOBE OF RIGHT LUNG (HCC): Primary | ICD-10-CM

## 2024-09-30 DIAGNOSIS — C34.90 MALIGNANT NEOPLASM OF LUNG, UNSPECIFIED LATERALITY, UNSPECIFIED PART OF LUNG (HCC): ICD-10-CM

## 2024-09-30 DIAGNOSIS — E03.9 ACQUIRED HYPOTHYROIDISM: ICD-10-CM

## 2024-09-30 DIAGNOSIS — C77.1 METASTASIS TO MEDIASTINAL LYMPH NODE (HCC): ICD-10-CM

## 2024-09-30 DIAGNOSIS — C77.0 METASTASIS TO CERVICAL LYMPH NODE (HCC): ICD-10-CM

## 2024-09-30 DIAGNOSIS — K56.609 SBO (SMALL BOWEL OBSTRUCTION) (HCC): ICD-10-CM

## 2024-09-30 DIAGNOSIS — L98.9 SKIN LESION: ICD-10-CM

## 2024-09-30 DIAGNOSIS — Z72.0 TOBACCO ABUSE: ICD-10-CM

## 2024-09-30 LAB
ALBUMIN SERPL-MCNC: 2.9 G/DL (ref 3.5–5.2)
ALP SERPL-CCNC: 86 U/L (ref 40–129)
ALT SERPL-CCNC: 10 U/L (ref 5–41)
ANION GAP SERPL CALCULATED.3IONS-SCNC: 11 MMOL/L (ref 9–17)
AST SERPL-CCNC: 20 U/L
BASOPHILS # BLD: 0 K/UL (ref 0–0.2)
BASOPHILS NFR BLD: 0 %
BILIRUB SERPL-MCNC: 0.3 MG/DL (ref 0.3–1.2)
BUN SERPL-MCNC: 13 MG/DL (ref 8–23)
BUN/CREAT SERPL: 14 (ref 9–20)
CALCIUM SERPL-MCNC: 8 MG/DL (ref 8.6–10.4)
CHLORIDE SERPL-SCNC: 99 MMOL/L (ref 98–107)
CO2 SERPL-SCNC: 23 MMOL/L (ref 20–31)
CREAT SERPL-MCNC: 0.9 MG/DL (ref 0.7–1.2)
EOSINOPHIL # BLD: 0.11 K/UL (ref 0–0.4)
EOSINOPHILS RELATIVE PERCENT: 1 % (ref 1–4)
ERYTHROCYTE [DISTWIDTH] IN BLOOD BY AUTOMATED COUNT: 14.9 % (ref 11.8–14.4)
GFR, ESTIMATED: >90 ML/MIN/1.73M2
GLUCOSE SERPL-MCNC: 112 MG/DL (ref 70–99)
HCT VFR BLD AUTO: 29.1 % (ref 40.7–50.3)
HGB BLD-MCNC: 9.6 G/DL (ref 13–17)
IMM GRANULOCYTES # BLD AUTO: 0 K/UL (ref 0–0.3)
IMM GRANULOCYTES NFR BLD: 0 %
LYMPHOCYTES NFR BLD: 0.32 K/UL (ref 1–4.8)
LYMPHOCYTES RELATIVE PERCENT: 3 % (ref 24–44)
MCH RBC QN AUTO: 31.4 PG (ref 25.2–33.5)
MCHC RBC AUTO-ENTMCNC: 33 G/DL (ref 28.4–34.8)
MCV RBC AUTO: 95.1 FL (ref 82.6–102.9)
MONOCYTES NFR BLD: 0.85 K/UL (ref 0.2–0.8)
MONOCYTES NFR BLD: 8 % (ref 1–7)
NEUTROPHILS NFR BLD: 88 % (ref 36–66)
NEUTS SEG NFR BLD: 9.32 K/UL (ref 1.8–7.7)
NRBC BLD-RTO: 0 PER 100 WBC
PLATELET # BLD AUTO: 375 K/UL (ref 138–453)
PMV BLD AUTO: 9.5 FL (ref 8.1–13.5)
POTASSIUM SERPL-SCNC: 4.6 MMOL/L (ref 3.7–5.3)
PROT SERPL-MCNC: 6.1 G/DL (ref 6.4–8.3)
RBC # BLD AUTO: 3.06 M/UL (ref 4.21–5.77)
SODIUM SERPL-SCNC: 133 MMOL/L (ref 135–144)
TSH SERPL DL<=0.05 MIU/L-ACNC: 13.64 UIU/ML (ref 0.3–5)
WBC OTHER # BLD: 10.6 K/UL (ref 3.5–11.3)

## 2024-09-30 PROCEDURE — 80053 COMPREHEN METABOLIC PANEL: CPT

## 2024-09-30 PROCEDURE — 36591 DRAW BLOOD OFF VENOUS DEVICE: CPT

## 2024-09-30 PROCEDURE — 2580000003 HC RX 258: Performed by: INTERNAL MEDICINE

## 2024-09-30 PROCEDURE — 99215 OFFICE O/P EST HI 40 MIN: CPT | Performed by: INTERNAL MEDICINE

## 2024-09-30 PROCEDURE — 85025 COMPLETE CBC W/AUTO DIFF WBC: CPT

## 2024-09-30 PROCEDURE — 99211 OFF/OP EST MAY X REQ PHY/QHP: CPT

## 2024-09-30 PROCEDURE — 84443 ASSAY THYROID STIM HORMONE: CPT

## 2024-09-30 PROCEDURE — 6360000002 HC RX W HCPCS: Performed by: INTERNAL MEDICINE

## 2024-09-30 RX ORDER — PREDNISONE 20 MG/1
20 TABLET ORAL DAILY
Qty: 10 TABLET | Refills: 0 | Status: SHIPPED | OUTPATIENT
Start: 2024-09-30 | End: 2024-10-10

## 2024-09-30 RX ORDER — SODIUM CHLORIDE 0.9 % (FLUSH) 0.9 %
5-40 SYRINGE (ML) INJECTION PRN
Status: DISCONTINUED | OUTPATIENT
Start: 2024-09-30 | End: 2024-10-01 | Stop reason: HOSPADM

## 2024-09-30 RX ORDER — HEPARIN 100 UNIT/ML
500 SYRINGE INTRAVENOUS PRN
Status: DISCONTINUED | OUTPATIENT
Start: 2024-09-30 | End: 2024-10-01 | Stop reason: HOSPADM

## 2024-09-30 RX ADMIN — SODIUM CHLORIDE, PRESERVATIVE FREE 10 ML: 5 INJECTION INTRAVENOUS at 11:50

## 2024-09-30 RX ADMIN — SODIUM CHLORIDE, PRESERVATIVE FREE 10 ML: 5 INJECTION INTRAVENOUS at 11:07

## 2024-09-30 RX ADMIN — HEPARIN 500 UNITS: 100 SYRINGE at 11:50

## 2024-09-30 NOTE — TELEPHONE ENCOUNTER
Name: Jony Portillo  : 1960  MRN: [id number]    Oncology Navigation Follow-Up Note    Contact Type:  Telephone    Notes:   Pt. Reports needing to move PET up to determine progression of nodes in abd? Pt. Planning to see [facility] for possible Bx of nodes. Pt. Sharing they think the nodes in abdomen may be putting pressure on the bowel.       Electronically signed by Erica Martin RN on 2024 at 12:22 PM

## 2024-09-30 NOTE — TELEPHONE ENCOUNTER
FRANCO HERE FOR FOLLOW UP  & TX   NO INSTRUCTIONS WRITTEN   PER DR. LERNER VERBALLY STATED MOVE PET SCAN UP, SEE PT AFTER & TX HELD TODAY   TX HELD TODAY   PET SCAN MOVE DUP TO 10/4/24 @ 10AM ARRIVAL @ 9:30AM   MD VISIT: 10/14/24 @ 12:15PM   REFERRAL FOR DR. ESPINOZA PLACED   DR. ESPINOZA: 10/31/24 @ 10:45AM (DR. ESPINOZA [place]ING [place])   AVS PRINTED AND GIVEN ON EXIT

## 2024-09-30 NOTE — PROGRESS NOTES
Component Value Date    WBC 10.6 09/30/2024    HGB 9.6 (L) 09/30/2024    HCT 29.1 (L) 09/30/2024    MCV 95.1 09/30/2024     09/30/2024       Chemistry        Component Value Date/Time     (L) 09/23/2024 0735    K 3.8 09/23/2024 0735     09/23/2024 0735    CO2 24 09/23/2024 0735    BUN 8 09/23/2024 0735    CREATININE 0.9 09/23/2024 0735        Component Value Date/Time    CALCIUM 8.2 (L) 09/23/2024 0735    ALKPHOS 65 09/21/2024 1257    AST 25 09/21/2024 1257    ALT 10 09/21/2024 1257    BILITOT 0.3 09/21/2024 1257        Lab Results   Component Value Date    TSH 2.76 09/09/2024       Pathology  Path Number: [id number]     -- Diagnosis --   RIGHT CERVICAL LYMPH NODE, NEEDLE CORE BIOPSY:   -METASTATIC LUNG ADENOCARCINOMA.   REVIEW OF RADIOLOGICAL RESULTS:   PET CT scan after 4 cycles  IMPRESSION:  1.  Resolution of many of the cervical and mediastinal and hilar lymph nodes  since the PET-CT scan 08/11/2023; however, there are new right axillary and  right cervical metabolically active lymph nodes and intense residual activity  in several additional cervical lymph nodes concerning for disease progression.     2.  New focal activity in the subcutaneous fat in the left scapular region  which could be metastatic disease or focal infectious or inflammatory change.  CT scan after 7 cycles   IMPRESSION:  1. Interval increase in size and number of the right axillary lymph nodes  compared to PET-CT 12/11/2023.  The largest lymph node now measures roughly  1.6 cm short axis and was not present on prior PET-CT.  These are concerning  for infectious, inflammatory or neoplastic process.  2. Emphysematous changes.  No suspicious pulmonary mass or nodule.  IMPRESSION:   Clinical diagnosis of metastatic lung cancer, clinical stage is T2 N2 M1  No evidence of metastatic disease outside of the mediastinum and cervical lymph nodes  Significant dysphagia and change in voice secondary to the tumor in the neck  Plan

## 2024-09-30 NOTE — PROGRESS NOTES
Patient arrived ambulatory with wife for cycle 10 day 1 treatment and physician visit.  Patient was discharged from hospital 1 week ago for small bowel obstruction. He also has swelling of lymph nodes to right side neck. He also complains of right shoulder pain. No other complaints or concerns.  Port accessed;specimen sent.  Labs reviewed. Physician at bedside. Will hold treatment today and will order testing.  Port flushed and heparinized with intact whitten needle removed per protocol.  Patient ambulated off unit with wife at discharge. Instructed to stop at  for AVS.

## 2024-10-04 ENCOUNTER — HOSPITAL ENCOUNTER (OUTPATIENT)
Dept: NUCLEAR MEDICINE | Age: 64
Discharge: HOME | End: 2024-10-06
Attending: INTERNAL MEDICINE
Payer: COMMERCIAL

## 2024-10-04 ENCOUNTER — TELEPHONE (OUTPATIENT)
Dept: INFUSION THERAPY | Age: 64
End: 2024-10-04

## 2024-10-04 DIAGNOSIS — E03.9 ACQUIRED HYPOTHYROIDISM: ICD-10-CM

## 2024-10-04 DIAGNOSIS — Z72.0 TOBACCO ABUSE: ICD-10-CM

## 2024-10-04 DIAGNOSIS — C77.0 METASTASIS TO CERVICAL LYMPH NODE (HCC): ICD-10-CM

## 2024-10-04 DIAGNOSIS — C34.11 MALIGNANT NEOPLASM OF UPPER LOBE OF RIGHT LUNG (HCC): ICD-10-CM

## 2024-10-04 LAB — GLUCOSE BLD-MCNC: 90 MG/DL (ref 75–110)

## 2024-10-04 PROCEDURE — 78815 PET IMAGE W/CT SKULL-THIGH: CPT

## 2024-10-04 PROCEDURE — 3430000000 HC RX DIAGNOSTIC RADIOPHARMACEUTICAL: Performed by: INTERNAL MEDICINE

## 2024-10-04 PROCEDURE — A9609 HC RX DIAGNOSTIC RADIOPHARMACEUTICAL: HCPCS | Performed by: INTERNAL MEDICINE

## 2024-10-04 PROCEDURE — 2580000003 HC RX 258: Performed by: INTERNAL MEDICINE

## 2024-10-04 PROCEDURE — 82947 ASSAY GLUCOSE BLOOD QUANT: CPT

## 2024-10-04 RX ORDER — FLUDEOXYGLUCOSE F 18 200 MCI/ML
10 INJECTION, SOLUTION INTRAVENOUS
Status: COMPLETED | OUTPATIENT
Start: 2024-10-04 | End: 2024-10-04

## 2024-10-04 RX ORDER — SODIUM CHLORIDE 0.9 % (FLUSH) 0.9 %
10 SYRINGE (ML) INJECTION PRN
Status: DISCONTINUED | OUTPATIENT
Start: 2024-10-04 | End: 2024-10-07 | Stop reason: HOSPADM

## 2024-10-04 RX ADMIN — SODIUM CHLORIDE, PRESERVATIVE FREE 10 ML: 5 INJECTION INTRAVENOUS at 09:58

## 2024-10-04 RX ADMIN — FLUDEOXYGLUCOSE F 18 10 MILLICURIE: 200 INJECTION, SOLUTION INTRAVENOUS at 09:55

## 2024-10-14 ENCOUNTER — TELEPHONE (OUTPATIENT)
Dept: ONCOLOGY | Age: 64
End: 2024-10-14

## 2024-10-14 ENCOUNTER — OFFICE VISIT (OUTPATIENT)
Dept: ONCOLOGY | Age: 64
End: 2024-10-14
Payer: COMMERCIAL

## 2024-10-14 ENCOUNTER — OFFICE VISIT (OUTPATIENT)
Age: 64
End: 2024-10-14
Payer: COMMERCIAL

## 2024-10-14 VITALS
WEIGHT: 165 LBS | HEIGHT: 75 IN | HEART RATE: 110 BPM | BODY MASS INDEX: 20.51 KG/M2 | DIASTOLIC BLOOD PRESSURE: 75 MMHG | SYSTOLIC BLOOD PRESSURE: 107 MMHG | OXYGEN SATURATION: 97 % | TEMPERATURE: 97.3 F

## 2024-10-14 VITALS
RESPIRATION RATE: 16 BRPM | DIASTOLIC BLOOD PRESSURE: 65 MMHG | TEMPERATURE: 97.1 F | SYSTOLIC BLOOD PRESSURE: 95 MMHG | HEART RATE: 105 BPM

## 2024-10-14 DIAGNOSIS — C77.0 METASTASIS TO CERVICAL LYMPH NODE (HCC): Primary | ICD-10-CM

## 2024-10-14 DIAGNOSIS — L98.9 SKIN LESION: ICD-10-CM

## 2024-10-14 DIAGNOSIS — C34.11 MALIGNANT NEOPLASM OF UPPER LOBE OF RIGHT LUNG (HCC): Primary | ICD-10-CM

## 2024-10-14 DIAGNOSIS — C34.11 MALIGNANT NEOPLASM OF UPPER LOBE OF RIGHT LUNG (HCC): ICD-10-CM

## 2024-10-14 DIAGNOSIS — C77.0 METASTASIS TO CERVICAL LYMPH NODE (HCC): ICD-10-CM

## 2024-10-14 PROCEDURE — 99211 OFF/OP EST MAY X REQ PHY/QHP: CPT | Performed by: INTERNAL MEDICINE

## 2024-10-14 PROCEDURE — 99215 OFFICE O/P EST HI 40 MIN: CPT | Performed by: INTERNAL MEDICINE

## 2024-10-14 PROCEDURE — 99203 OFFICE O/P NEW LOW 30 MIN: CPT | Performed by: NURSE PRACTITIONER

## 2024-10-14 RX ORDER — OMEPRAZOLE 20 MG/1
20 TABLET, DELAYED RELEASE ORAL DAILY
Qty: 30 TABLET | Refills: 3 | Status: ON HOLD | OUTPATIENT
Start: 2024-10-14

## 2024-10-14 RX ORDER — DEXAMETHASONE 4 MG/1
TABLET ORAL
Qty: 20 TABLET | Refills: 1 | Status: ON HOLD | OUTPATIENT
Start: 2024-10-14

## 2024-10-14 RX ORDER — MELOXICAM 15 MG/1
15 TABLET ORAL DAILY
Qty: 30 TABLET | Refills: 0 | Status: ON HOLD | OUTPATIENT
Start: 2024-10-14

## 2024-10-14 RX ORDER — ONDANSETRON 8 MG/1
8 TABLET, FILM COATED ORAL EVERY 8 HOURS PRN
Qty: 10 TABLET | Refills: 2 | Status: ON HOLD | OUTPATIENT
Start: 2024-10-14

## 2024-10-14 ASSESSMENT — ENCOUNTER SYMPTOMS
COUGH: 0
ABDOMINAL PAIN: 0
RHINORRHEA: 0
ROS SKIN COMMENTS: SEE HPI.
NAUSEA: 0
SHORTNESS OF BREATH: 0
VOMITING: 0

## 2024-10-14 NOTE — PROGRESS NOTES
of possible lung primary.    He presented with unprovoked DVT in the right lower extremity.  Because of the nature of his unprovoked DVT and his symptoms of neck swelling, he underwent a CT scan of the chest that showed significant mediastinal adenopathy. Patient recently admitted with SBO. Treated conservatively, however imaging revealed findings concerning for recurrent cancer.   Patient with multiple skin lesions concerning for progressive malignancy.   Per Dr. Guzmán's note, 9-30-24: \"Need for skin biopsy to confirm progressive metastatic disease, need for molecular testing\"  Major medical hx: CA, hx of blood clots  Blood thinning medications: Eliquis     PLAN  Patient was scheduled for biopsy as soon as possible  Please note that this is a late entry, surgery was completed on October 15, 2024.  Please see operative note for further details.  Pathology results pending at the signing of this note    SURGERY/PROCEDURE SCHEDULING  PROCEDURE NAME: Biopsy skin lesion     PROCEDURE DIAGNOSIS: Skin lesion     ANESTHESIA TYPE: Local     BLOOD THINNERS: Eliquis, no need to hold blood thinner     CLEARANCE: None     HOSPITAL: [facility]     Pre-admission testing per protocol with [facility].     ENCOUNTER DIAGNOSES    ICD-10-CM    1. Malignant neoplasm of upper lobe of right lung (HCC)  C34.11       2. Metastasis to cervical lymph node (HCC)  C77.0       3. Skin lesion  L98.9           Return if symptoms worsen or fail to improve.    The patient, Jony Portillo is a 63 y.o. male, was seen with a total time spent of 35 minutes for the visit on this date of service by the E/M provider. The time component had both face to face and non face to face time spent in determining the total time component.  Counseling and education regarding the patient's diagnosis listed below and the patient's options regarding those diagnoses were also included in determining the time component.      Electronically

## 2024-10-14 NOTE — TELEPHONE ENCOUNTER
Name: Jony Portillo  : 1960  MRN: [id number]    Oncology Navigation Follow-Up Note    Contact Type:  Telephone    Notes:   Navigator met with pt. And spouse face to face and pts. Cancer has progressed and Dr. Diallo wanting to start new regimen urgently. Pt. Very withdrawn and sharing, \"I dont have any other options\" Spouse wanting to make sure Tx processed urgently.       Electronically signed by Erica Martin RN on 10/14/2024 at 2:00 PM

## 2024-10-14 NOTE — PATIENT INSTRUCTIONS
Please send new chemo urgently, plan to start as soon as possible. If BEvacizumab is not approved, can start without it.  Rv to see me in 3 weeks with cycle 2 with cbc, cmp

## 2024-10-14 NOTE — TELEPHONE ENCOUNTER
FRANCO HERE FOR MD VISIT  Please send new chemo urgently, plan to start as soon as possible. If BEvacizumab is not approved, can start without it.  Rv to see me in 3 weeks with cycle 2 with cbc, cmp   NEW ORDER IS PENDING PRECERT  MD VISIT IN 3 WKS  AVS PRINTED W/ INSTRUCTIONS AND GIVEN TO PT ON EXIT

## 2024-10-15 ENCOUNTER — TELEPHONE (OUTPATIENT)
Age: 64
End: 2024-10-15

## 2024-10-15 NOTE — TELEPHONE ENCOUNTER
Staff, please call patient and see if he is available for surgery tomorrow to follow last case.  Local anesthesia.  No need to hold blood thinners.    SURGERY/PROCEDURE SCHEDULING  PROCEDURE NAME: Biopsy skin lesion    PROCEDURE DIAGNOSIS: Skin lesion    ANESTHESIA TYPE: Local    BLOOD THINNERS: Eliquis, no need to hold blood thinner    CLEARANCE: None    HOSPITAL: [facility]    Pre-admission testing per protocol with [facility].

## 2024-10-15 NOTE — PROGRESS NOTES
CLINICAL PHARMACY NOTE: MEDS TO BEDS    Total # of Prescriptions Filled: 2   The following medications were delivered to the patient:  Cephalexin 500MG Capsules  Sulfamethoxazole-Trime 800-160    Additional Documentation:  Picked up at the pharmacy by wife of the PT Connie Portillo at 4:13PM 10/15/24

## 2024-10-15 NOTE — OP NOTE
[facility] General Surgery   Arjun Cheney MD, FACS  Aleah AUGUSTEHollis Godfrey, APRN-CNP  [address], [address]  [place] [address]  P: [phone], F: [phone]      Preoperative diagnosis: Likely metastatic skin lesion in the right neck.  History of lung cancer.    Postoperative diagnosis: Same    Procedure: Excision likely metastatic skin lesion 2 x 1 cm right neck.    Surgeon: Dr. Cheney    First Assist: None    Anesthesia: Local    Preparation: Hibiclens    EBL: Less than 10 mL    Specimen: Skin lesion right neck approximately 2 x 1 cm likely metastatic    Procedure: Informed consent was obtained.  Site was marked and confirmed.  Patient was taken to the operating room.  Vital signs were monitored remained stable.  Operative site was prepped and draped in usual sterile fashion.  Timeout was done.  Local anesthetic was infiltrated.  Incision was made at the marked site using a #15 blade.  Dissection was carried out.  Approximately 2 x 1 cm skin lesion likely metastatic excised from the right neck.  Specimen was labeled and sent to pathology.  We informed the pathologist regarding testing requested by the oncologist.  Wound was explored.  Hemostasis was confirmed.  Sponge needle instrument counts found to be correct.  After confirming hemostasis sponge needle instrument count wound was approximated using absorbable sutures.  Dermabond was applied.  Patient tolerated procedure well and was transferred to the recovery room in a stable condition.    Recommendations: Findings discussed with the patient's family at length.  Findings discussed with the patient.  Prescription of antibiotic called in.  Discharge instructions in the chart.  Follow-up with medical oncology as directed.  Follow-up in the office with me as needed.

## 2024-10-15 NOTE — TELEPHONE ENCOUNTER
Called patient and schedule a surgery.    TM/SC/ WEDNESDAY 10//2024 AT 3:45 PM/ EXCISION SKIN LESION RIGHT SIDE NECK/ EPIC  PAT:    ANESTHESIA :LOCAL

## 2024-10-15 NOTE — H&P
HISTORY and PHYSICAL  [facility]       NAME:  Jony Portillo  MRN: [id number]   YOB: 1960   Date: 10/15/2024   Age: 63 y.o.  Gender: male       COMPLAINT AND PRESENT HISTORY:     Jony Portillo is 63 y.o.,  male, presents for NECK LESION BIOPSY EXCISION     Primary dx: Skin lesion of neck [L98.9].  HPI:  See portion of the note below per Dr Guzmán from office visit on 9/30/24    Jony Portillo is a very pleasant 63 y.o. male who is referred to us for recently diagnosed adenocarcinoma of possible lung primary.  He presented with unprovoked DVT in the right lower extremity.  Because of the nature of his unprovoked DVT and his symptoms of neck swelling, he underwent a CT scan of the chest that showed significant mediastinal adenopathy.  He is sent to us for a consultation, he is having difficulty sleeping and change in voice.  Most of this difficulty in sleeping is secondary to swelling in the neck.  He does not have any chest pain or shortness of breath and he does not have any hemoptysis.  No change in weight.  Tolerating anticoagulation very well.  Confirmation of the biopsy showed that this is adenocarcinoma of lung primary.  PET CT scan showed right hilar mass with mediastinal and cervical adenopathy.  Molecular testing showed no targetable mutation, we decided to treat him with carboplatin, Alimta and Keytruda  After 4 cycles, the PET CT scan showed resolution of the known cervical and mediastinal hilar lymph node.  However, there was some intense activity and some lymph node that was deemed to be reactive.  Overall, it was felt that he is responding well and we decided to continue on maintenance treatment.  After 3 cycles, we noticed that there is further growth in the specific lymphadenopathy.  CT scan confirmed unfortunate progression so he is refractory to immunotherapy  We felt that he might still be sensitive to chemotherapy so we decided to go back to carboplatin and

## 2024-10-15 NOTE — TELEPHONE ENCOUNTER
Called patient and he has agreed to do surgery today.  CHAYA/SC/10/15/2024 AT 1:30 PM/ EXCISION BIOPSY LESION RIGHT NECK/PAVEL  PAT: UPON ARRIVAL  ANESTHESIA:LOCAL

## 2024-10-17 DIAGNOSIS — C34.11 MALIGNANT NEOPLASM OF UPPER LOBE OF RIGHT LUNG (HCC): Primary | ICD-10-CM

## 2024-10-17 RX ORDER — HEPARIN SODIUM (PORCINE) LOCK FLUSH IV SOLN 100 UNIT/ML 100 UNIT/ML
500 SOLUTION INTRAVENOUS PRN
OUTPATIENT
Start: 2024-10-21

## 2024-10-17 RX ORDER — EPINEPHRINE 1 MG/ML
0.3 INJECTION, SOLUTION, CONCENTRATE INTRAVENOUS PRN
OUTPATIENT
Start: 2024-10-21

## 2024-10-17 RX ORDER — SODIUM CHLORIDE 0.9 % (FLUSH) 0.9 %
5-40 SYRINGE (ML) INJECTION PRN
OUTPATIENT
Start: 2024-10-21

## 2024-10-17 RX ORDER — SODIUM CHLORIDE 9 MG/ML
5-250 INJECTION, SOLUTION INTRAVENOUS PRN
OUTPATIENT
Start: 2024-10-21

## 2024-10-17 RX ORDER — SODIUM CHLORIDE 9 MG/ML
INJECTION, SOLUTION INTRAVENOUS CONTINUOUS
OUTPATIENT
Start: 2024-10-21

## 2024-10-17 RX ORDER — DIPHENHYDRAMINE HYDROCHLORIDE 50 MG/ML
50 INJECTION INTRAMUSCULAR; INTRAVENOUS ONCE
OUTPATIENT
Start: 2024-10-21 | End: 2024-10-21

## 2024-10-17 RX ORDER — FAMOTIDINE 10 MG/ML
20 INJECTION, SOLUTION INTRAVENOUS ONCE
OUTPATIENT
Start: 2024-10-21 | End: 2024-10-21

## 2024-10-17 RX ORDER — PALONOSETRON 0.05 MG/ML
0.25 INJECTION, SOLUTION INTRAVENOUS ONCE
OUTPATIENT
Start: 2024-10-21 | End: 2024-10-21

## 2024-10-17 RX ORDER — FAMOTIDINE 10 MG/ML
20 INJECTION, SOLUTION INTRAVENOUS
OUTPATIENT
Start: 2024-10-21

## 2024-10-17 RX ORDER — ALBUTEROL SULFATE 90 UG/1
4 INHALANT RESPIRATORY (INHALATION) PRN
OUTPATIENT
Start: 2024-10-21

## 2024-10-17 RX ORDER — PROCHLORPERAZINE EDISYLATE 5 MG/ML
5 INJECTION INTRAMUSCULAR; INTRAVENOUS
OUTPATIENT
Start: 2024-10-21

## 2024-10-17 RX ORDER — MEPERIDINE HYDROCHLORIDE 50 MG/ML
12.5 INJECTION INTRAMUSCULAR; INTRAVENOUS; SUBCUTANEOUS PRN
OUTPATIENT
Start: 2024-10-21

## 2024-10-17 RX ORDER — DIPHENHYDRAMINE HYDROCHLORIDE 50 MG/ML
50 INJECTION INTRAMUSCULAR; INTRAVENOUS
OUTPATIENT
Start: 2024-10-21

## 2024-10-17 RX ORDER — ACETAMINOPHEN 325 MG/1
650 TABLET ORAL
OUTPATIENT
Start: 2024-10-21

## 2024-10-17 RX ORDER — ONDANSETRON 2 MG/ML
8 INJECTION INTRAMUSCULAR; INTRAVENOUS
OUTPATIENT
Start: 2024-10-21

## 2024-10-18 ENCOUNTER — TELEPHONE (OUTPATIENT)
Dept: ONCOLOGY | Age: 64
End: 2024-10-18

## 2024-10-18 DIAGNOSIS — C34.11 MALIGNANT NEOPLASM OF UPPER LOBE OF RIGHT LUNG (HCC): Primary | ICD-10-CM

## 2024-10-18 DIAGNOSIS — C77.0 METASTASIS TO CERVICAL LYMPH NODE (HCC): ICD-10-CM

## 2024-10-18 NOTE — TELEPHONE ENCOUNTER
New chemo order, labs in Psychiatric, per md note pt to start asap due to progression    Agree with ht wt and bsa  Ht 190.5 cm, wt 74.8 kg, bsa 1.99    Mvasi dose is 15 mg/kg or 1122 mg IV , rounded per epic to 1100 mg, agree    Taxol dose is 175 mg/m2 348 mg IV, same per epic dose, agree.    Carboplatin 575 mg IV, AUC 5  and agree.    Labs in epic and snapshot up-dated and to pool for second rn check.

## 2024-10-19 PROBLEM — I26.99 PULMONARY EMBOLISM (HCC): Status: ACTIVE | Noted: 2024-10-19

## 2024-10-19 PROBLEM — D64.9 NORMOCYTIC ANEMIA: Status: ACTIVE | Noted: 2024-01-01

## 2024-10-19 PROBLEM — E87.1 HYPONATREMIA: Status: ACTIVE | Noted: 2024-10-19

## 2024-10-19 PROBLEM — R79.89 ELEVATED LACTIC ACID LEVEL: Status: ACTIVE | Noted: 2024-01-01

## 2024-10-19 PROBLEM — E87.20 METABOLIC ACIDOSIS: Status: ACTIVE | Noted: 2024-10-19

## 2024-10-19 PROBLEM — E87.5 HYPERKALEMIA: Status: ACTIVE | Noted: 2024-10-19

## 2024-10-19 PROBLEM — N17.9 AKI (ACUTE KIDNEY INJURY) (HCC): Status: ACTIVE | Noted: 2024-01-01

## 2024-10-19 PROBLEM — D72.829 LEUKOCYTOSIS: Status: ACTIVE | Noted: 2024-01-01

## 2024-10-19 NOTE — PROGRESS NOTES
Dialysis Time Out  To be done by RN and tech or 2 RNs  Staff Names Kedar SABILLON RN & Shikha WOODRUFF RN.    [x]  Identity of the patient using 2 patient identifiers  [x]  Consent for treatment  [x]  Equipment-proper machine and dialyzer  [x]  B-Hep B status (Hep BsAg negative on 10/19/24)  [x]  Orders- to include bath, blood flow, dialyzer, time and fluid removal  [x]  Access-Correct site and in working order  [x]  Time for patient to ask questions. (Spouse at bedside)

## 2024-10-19 NOTE — PROCEDURES
PROCEDURE NOTE - CENTRAL VENOUS LINE PLACEMENT    PATIENT NAME: Jony Portillo  MEDICAL RECORD NO. [id number]  DATE: 10/19/2024  ATTENDING PHYSICIAN: Conrad Ross MD      PREOPERATIVE DIAGNOSIS:  Need for Hemodialysis  POSTOPERATIVE DIAGNOSIS:  Same  PROCEDURE PERFORMED:  Left Femoral Vein Mal Catheter Insertion  PERFORMING PHYSICIAN: Malissa Smith MD  ANESTHESIA:  Local utilizing 1% lidocaine  ESTIMATED BLOOD LOSS:  Less than 25 ml  COMPLICATIONS:  None immediately appreciated.     DISCUSSION:  Jony Portillo is a 63 y.o.-year-old male who requires Mal Catheter  . The history and physical examination were reviewed and confirmed.  The diagnoses, proposed procedure, risks, possible complications, benefits and alternatives were discussed with the patient or family. He was given the opportunity to ask questions, and once answered, informed consent was obtained.  The patient was then prepared for the procedure.    PROCEDURE:  A timeout was initiated by the bedside nurse and was confirmed by those present.  The patient was placed in a supine position. The skin overlying the Left Femoral Vein was prepped with chlorhexadine and draped in sterile fashion. The skin was infiltrated with local anesthetic. The vessel and surrounding anatomy was visualized using ultrasound probe. Through the anesthetized region, the introducer needle was inserted into the femoral vein returning dark red non pulsatile blood. A guidewire was placed through the center of the needle with no resistance. Ultrasound confirmed presence of wire in the left femoral vein. A small incision made in the skin with a #11 scalpel blade. The dilator was inserted into the skin and vein over guidewire using Seldinger technique. The dilator was then removed and the catheter was placed in the vein over the guidewire using Seldinger technique. The guidewire was then removed and all ports aspirated and flushed appropriately. The Mal catheter then secured

## 2024-10-19 NOTE — CONSULTS
Nephrology Consult Note    Reason for Consult:  renal failure  Requesting Physician:  Eron Monahan MD     History Obtained From:  patient, electronic medical record    Chief Complaint:     Chief Complaint   Patient presents with    Fatigue       History of Present Illness:               This is a 63 y.o. male who presented to the hospital for evaluation of worsening fatigue, shortness of breath after a syncopal event without loss of consciousness.  Currently undergoing chemotherapy for metastatic primary lung cancer. Per patient he had been feeling poorly for approximately 5 days prior to admission.  He was going to the bathroom which he encountered acute fatigue/near syncopal event and he lowered himself to the floor.  EMS was called and patient was admitted through the ER.  He was found to be hypothermic, hypotensive with severe metabolic derangement on admission.     Patient recently was treated with Bactrim in the outpatient setting after skin lesion biopsy was completed by  on 10/15/24    He does not follow with a nephrologist.  Renal function is normal at baseline with a creatinine between 0.7 and 0.9    CTA chest demonstrated a small pulmonary embolism in the subsegmental branch of the upper lobe of the right pulmonary artery with metastatic lesions.    Labs on admission showed sodium 116, potassium 7.0, chloride 82, CO2 17, , creatinine 4.6, anion gap 17, EGFR 14, lactic acid 2.9, glucose 89, Pro-Justin 14.0, , total protein 5.3, triglycerides 169, albumin 2.4, alk phos 213, ALT 35, AST 46, total bilirubin 0.6, cortisol level 11.1, TSH 9.7 with a free T4 of 0.6, WBCs 28.6, hemoglobin 9.6, hematocrit 28.1 with a platelet count of 74    UA showing dark cloudy urine negative for glucose, bilirubin, ketones, nitrites, leukocyte esterase.  There is trace protein in the urine with a specific gravity 1.022 and a pH of 5.0 with 20-50 hyaline casts and a small amount of hemoglobin in the  Ultrasound to r/o element of obstruction and to assess the kidney size/echotexture.  Comprehensive urine testing including Urinalysis, Urine sodium, potassium, chloride, Urine protein and creatinine to quantify the proteinuria if any at all. Will check urinary eosinophils as well  Will order Hepatitis B and C, LIZA, ANCA, Complement levels if indicated  Sodium levels every 8 hours  BMP to evening and in am.  Will follow.     Thank you for the consultation.  Please do not hesitate to call with questions.    Electronically signed by IVIS Thompson NP on 10/19/2024 at 10:40 AM    Attending Physician Statement  I have discussed the care of this patient, including pertinent history and exam findings, with the Resident/CNP. I have seen and examined the patient myself. I have reviewed and edited the key elements of all parts of the encounter with the Resident/CNP.  I agree with the assessment, plan and orders as documented by the Resident/CNP. In addition 63-year-old male with past medical history of metastatic lung carcinoma undergoing chemotherapy, history of DVT, hypothyroidism presented to the hospital due to symptoms of near syncope, acute fatigue.  EMS was called and when patient was found to be hypothermic, hypotensive.  He was recently being treated with Bactrim after skin lesion biopsy.  Patient's renal function is normal at baseline.  Patient did get CTA chest done on admission which showed small pulmonary embolism.  Patient's initial BMP results from 10/19/2024 showed sodium 116, potassium 7.4, chloride 82, bicarb 15, calcium 8.0, , creatinine 4.4 mg/dl.  Patient did receive acute hyperkalemia management but potassium was still elevated with a value of 7.0 being most recent.  Louis catheter was inserted on admission.  Nephrology is consulted due to acute kidney injury along with severe hyperkalemia and other electrolyte imbalances and acid-base disorder.  Assessment: Acute kidney injury likely due to

## 2024-10-19 NOTE — ED NOTES
Pt presents to the ED via EMS with c/o weakness  Pt admits generalized weakness ongoing for 3-4 days  Pt states he felt weak while going to restroom and lowered self to ground  Pt admits mild CP; admits use of blood thinners daily  Pt admits SOB  Pt states he is a cancer patient and is in active chemo; lung cancer with metastasis  Pt AxO x4 on arrival   Denies any other complaints  Whiteboard updated, call light in reach, pt on full cardiac monitor

## 2024-10-19 NOTE — ED NOTES
The following labs labeled with pt sticker and tubed to lab:     [x] Blue     [x] Lavender   [] on ice  [x] Green/yellow  [] Green/black [] on ice  [] Yellow  [] Red  [] Pink      [] COVID-19 swab    [] Rapid  [] PCR  [] Flu Swab  [] Strep Swab  [] Peds Viral Panel     [] Urine Sample  [] Pelvic Cultures  [] Blood Cultures   [] Wound Cultures '

## 2024-10-19 NOTE — CARE COORDINATION
Case Management to discuss the discharge plan with any other family members/significant others, and if so, who? (P) Yes (wife)  Plans to Return to Present Housing: (P) Unknown at present  Other Identified Issues/Barriers to RETURNING to current housing: level of care  Potential Assistance needed at discharge: (P) N/A            Potential DME:    Patient expects to discharge to: (P) Unknown  Plan for transportation at discharge:      Financial    Payor: [employer] / Plan: [employer] - OPEN ACCESS (HMO) / Product Type: *No Product type* /     Does insurance require precert for SNF: Yes    Potential assistance Purchasing Medications: (P) No  Meds-to-Beds request: Yes      [facility] [id number] [place], [place] - [address] - P [phone] - F [phone]  [address] [facility] [address]  Phone: [phone] Fax: [phone]      Notes:    Factors facilitating achievement of predicted outcomes: Family support, Cooperative, and Pleasant    Barriers to discharge: Medical complications    Additional Case Management Notes: From home with wife, wheelchair dependent, new HD, plan will depend on pt progress.      Case Management Services Information Letter Provided [x]      The Plan for Transition of Care is related to the following treatment goals of Hyperkalemia [E87.5]  Hyponatremia [E87.1]  NAZIA (acute kidney injury) (HCC) [N17.9]  Acute pulmonary embolism without acute cor pulmonale, unspecified pulmonary embolism type (HCC) [I26.99]    IF APPLICABLE: The Patient and/or patient representative Jony and his family were provided with a choice of provider and agrees with the discharge plan. Freedom of choice list with basic dialogue that supports the patient's individualized plan of care/goals and shares the quality data associated with the providers was provided to:     Patient Representative Name:       The Patient and/or Patient Representative Agree with the Discharge Plan?      Lauren Velarde RN  Case Management  Department  Ph: [phone] Fax: [phone]

## 2024-10-19 NOTE — CARE COORDINATION
10/19/24 1547   Readmission Assessment   Number of Days since last admission? 8-30 days   Previous Disposition Home with Family   Who is being Interviewed Patient   What was the patient's/caregiver's perception as to why they think they needed to return back to the hospital? Did not realize care needs would be so extensive   Did you visit your Primary Care Physician after you left the hospital, before you returned this time? No   Why weren't you able to visit your PCP? Did not have an appointment;Did not want to go (Comment)   Did you see a specialist, such as Cardiac, Pulmonary, Orthopedic Physician, etc. after you left the hospital? No   Who advised the patient to return to the hospital? Self-referral   Does the patient report anything that got in the way of taking their medications? No   In our efforts to provide the best possible care to you and others like you, can you think of anything that we could have done to help you after you left the hospital the first time, so that you might not have needed to return so soon? Other (Comment)

## 2024-10-19 NOTE — H&P
Critical Care - History and Physical Examination    Patient's name:  Jony Portillo  Medical Record Number: [id number]  Patient's account/billing number: [id number]  Patient's YOB: 1960  Age: 63 y.o.  Date of Admission: 10/19/2024  1:44 AM  Date of History and Physical Examination: 10/19/2024      Primary Care Physician: Keisha Hackett MD  Attending Physician: Dr. Conrad MD    Code Status: Full Code    Chief complaint:   Chief Complaint   Patient presents with    Fatigue         HISTORY OF PRESENT ILLNESS:      History was obtained from chart review and the patient.      Jony Portillo is a 63 y.o. with PMH of   -Adenocarcinoma of lung with metastasis to liver, bone, lymph nodes-on chemotherapy   -Hypothyroidism  -Right leg DVT 7/23    Presented to ER with generalized weakness and shortness of breath for last 3 to 4 days, patient felt weak while going to restroom and lowered himself to the ground, no loss of consciousness, associated with poor appetite  In the ER patient was hypothermic with temperature 96.3, blood pressure 99/63, heart rate in the 80s, maintaining saturation on room air  Labs showed hyponatremia with sodium of 117, potassium 7.3, bicarb 12, anion gap 20, creatinine 4.4, WBC 28, urine osmolality 338, urine sodium less than 20  CT PE showed small pulmonary embolism in the subsegmental branch of upper lobe laterally right pulmonary artery, mild to moderate COPD: Scattered metastatic lesions    Additionally patient underwent lesion biopsy on 10/15 and was given Bactrim    PAST MEDICAL HISTORY:         Diagnosis Date    Cancer (HCC)     lung with mets    History of DVT (deep vein thrombosis)     Hx of blood clots     Snores     Thyroid disease          PAST SURGICAL HISTORY:         Procedure Laterality Date    CYST REMOVAL  2010    DENTAL SURGERY      extractions    IR PORT PLACEMENT EQUAL OR GREATER THAN 5 YEARS  9/15/2023    IR PORT PLACEMENT EQUAL OR GREATER THAN 5 YEARS 9/15/2023  in the last 72 hours.      Last Echocardiogram findings:   (See actual reports for details)  Not available in the chart    Radiological imaging  CT CHEST PULMONARY EMBOLISM W CONTRAST    Result Date: 10/19/2024  1. Small pulmonary embolism in a subsegmental branch of the upper lobar branch of right pulmonary artery. 2. No additional pulmonary embolism. 3. Mild to moderate COPD. 4. Moderate amount of ascites in upper abdomen. 5. Multiple ill-defined hypodense areas in the visualized upper and midportion of liver, suggestive of scattered metastatic lesions but not optimally evaluated. Surface of liver appears to be mildly nodular.  On the PET-CT scan on 10/04/2024, there was evidence of multiple metastatic lesions in liver. Findings of pulmonary embolism have been discussed by me with Dr. Noel Bullard, at 3:27 a.m. on 10/19/2024.     XR CHEST PORTABLE    Result Date: 10/19/2024  EXAMINATION: ONE XRAY VIEW OF THE CHEST 10/19/2024 2:07 am COMPARISON: 07/31/2023 HISTORY: ORDERING SYSTEM PROVIDED HISTORY: shortness of breath, h/o lung cancer TECHNOLOGIST PROVIDED HISTORY: shortness of breath, h/o lung cancer Reason for Exam: upr,weakness,sob,cp,hx lung cancer FINDINGS: Normal art size and pulmonary vasculature.  The lungs are clear.  Lung volumes are low.  No pneumothorax or pleural effusion.  A left Port-A-Cath has been placed in the interim.  The distal tip is within the right atrium. Surrounding osseous and soft tissue structures are otherwise unremarkable.     1. No acute cardiopulmonary process. 2. Left Port-A-Cath placement.     PET CT SKULL BASE TO MID THIGH    Result Date: 10/4/2024  EXAMINATION: WHOLE BODY PET/CT 10/4/2024 TECHNIQUE: Following IV injection of 10.7 mCi of F-18 FDG, PET  tumor imaging was acquired from the base of the skull to the mid thighs.  Computed tomography was used for purposes of attenuation correction and anatomic localization. Fusion imaging was utilized for interpretation. Uptake time 52

## 2024-10-19 NOTE — PLAN OF CARE
Problem: Pain  Goal: Verbalizes/displays adequate comfort level or baseline comfort level  Outcome: Progressing     Problem: Safety - Adult  Goal: Free from fall injury  Outcome: Progressing     Problem: ABCDS Injury Assessment  Goal: Absence of physical injury  Outcome: Progressing     Problem: Skin/Tissue Integrity  Goal: Absence of new skin breakdown  Outcome: Progressing

## 2024-10-19 NOTE — PROGRESS NOTES
Patient arrived to Room 2023 with ED RN Danica at 0540 on monitor. Currently infusing heparin at 12 units/kg/hr. Pt. A&O x4. Denies any symptoms at this time. Resting comfortably in bed. Oriented to room and safety. Call light in reach, bed locked in lowest position.

## 2024-10-19 NOTE — ED NOTES
Report given to [facility]  All questions answered

## 2024-10-19 NOTE — ED PROVIDER NOTES
[facility]  Emergency Department Encounter  Emergency Medicine Resident     Pt Name:Jony Portillo  MRN: [id number]  Birthdate 1960  Date of evaluation: 10/19/24  PCP:  Keisha Hackett MD  Note Started: 7:24 AM EDT      CHIEF COMPLAINT       Chief Complaint   Patient presents with    Fatigue       HISTORY OF PRESENT ILLNESS  (Location/Symptom, Timing/Onset, Context/Setting, Quality, Duration, Modifying Factors, Severity.)      Jony Portillo is a 63 y.o. male who presents with complaints of weakness X 3 to 4 days associated with chest pain and shortness of breath.  Patient was brought in by EMS after he lowered himself to the ground from the toilet and was unable to get back onto the wheelchair.  Patient denies any trauma to the head, LOC.  Patient is on Eliquis for history of DVT, additionally has history of lung adenocarcinoma with metastasis.  Patient is due for chemotherapy starting Monday.  Patient has multiple lesions around his neck which recently got biopsied at [facility] on 10/16/2024 followed by discharge on Keflex and Bactrim.    PAST MEDICAL / SURGICAL / SOCIAL / FAMILY HISTORY      has a past medical history of Cancer (HCC), History of DVT (deep vein thrombosis), Hx of blood clots, Snores, and Thyroid disease.       has a past surgical history that includes cyst removal (2010); Dental surgery; US BIOPSY LYMPH NODE (8/15/2023); IR PORT PLACEMENT > 5 YEARS (9/15/2023); and Neck surgery (N/A, 10/15/2024).      Social History     Socioeconomic History    Marital status:      Spouse name: Not on file    Number of children: Not on file    Years of education: Not on file    Highest education level: Not on file   Occupational History    Not on file   Tobacco Use    Smoking status: Every Day     Current packs/day: 0.25     Average packs/day: 0.3 packs/day for 40.8 years (10.2 ttl pk-yrs)     Types: Cigarettes     Start date: 3/12/1985    Smokeless tobacco: Never    Tobacco comments:

## 2024-10-19 NOTE — PROGRESS NOTES
10/19/24 0544   RT Protocol   History Pulmonary Disease 1   Respiratory pattern 2   Breath sounds 2   Cough 0   Indications for Bronchodilator Therapy Decreased or absent breath sounds   Bronchodilator Assessment Score 5

## 2024-10-19 NOTE — CONSULTS
acidosis [E87.20] 10/19/2024    Pulmonary embolism (HCC) [I26.99] 10/19/2024    Elevated lactic acid level [R79.89] 10/19/2024    Malignant neoplasm of upper lobe of right lung (HCC) [C34.11] 09/11/2023    Other fatigue [R53.83] 07/24/2023         IMPRESSION:   Metastatic upper lobe adenocarcinoma of the lung  Liver metastasis  Acute DVT  Acute PE  Acute kidney failure   Hyponatremia and hypyerkalemia  Thrombocytopenia  Normocytic anemia    RECOMMENDATIONS:  I reviewed the labs/imaging available to me,outside records and discussed with the patient.I explained to the patient the nature of this problem. I explained the significance of these abnormalities and possible etiology and management options   64-year-old man with recurrent adenocarcinoma of the lung progress on second line chemotherapy and immunotherapy and wants to start third line chemotherapy CarboTaxol Avastin next week presented with shortness of breath and found to have acute DVT and pulmonary embolism provoked by malignancy> need indefinite anticoagulation  Acute kidney injury could be provoked by chemotherapy which has been discontinued(Alimta)  Optimizing electrolyte/fluid/appreciate nephrology evaluation  With new onset NAZIA and thrombocytopenia > rule out TTP> hemolysis panel/peripheral blood smear  Will follow the patient's  Follow-up with oncology after discharge      Discussed with patient and Nurse.      Thank you for asking us to see this patient.                                    Anamaria Castano MD                          [facility]                            This note is created with the assistance of a speech recognition program.  While intending to generate a document that actually reflects the content of the visit, the document can still have some errors including those of syntax and sound a like substitutions which may escape proof reading.  It such instances, actual meaning can be extrapolated by contextual diversion.      Hematologist/Medical Oncologist

## 2024-10-19 NOTE — PROGRESS NOTES
Dialysis Post Treatment Note  Vitals:    10/19/24 1330   BP: 109/66   Pulse: 87   Resp: 25   Temp: 97 °F (36.1 °C)   SpO2: 100%     Pre-Weight = 76.4 kg  Post-weight = Weight - Scale: 76.5 kg (168 lb 10.4 oz)  Total Liters Processed = Blood Volume Processed (Liters): 29.54 L  Rinseback Volume (mL) = Rinseback Volume (ml): 300 ml  Net Removal (mL) =  0  Patient's dry weight= TBD  Type of access used= Left Fem Cath  Length of treatment=152 mins      Pt tolerated tx well. Tx completed without fluid removal. Blood returned w/o issues. Cath worked well. SBP dropped at one point but still within range. Temp adjusted with good results. Dressing changed. Pt remained in ICU. Report given to primary RN Shikha WOODRUFF

## 2024-10-19 NOTE — CONSULTS
Division of Vascular Surgery        New Consult      Physician Requesting Consult:  Mateusz    Reason for Consult:   Pulmonary embolus     Chief Complaint:      Shortness of breath    History of Present Illness:      Jony Portillo is a 63 y.o. male with history of lung adenocarcinoma who presented to the ED today with complaints of shortness of breath. Workup in the ED revealed a small subsegmental PE in the right upper lobe. Reports a history of previous left popliteal Dvt last year.    He is also complaining of abdominal fullness.  He has chronic bilateral lower extremity swelling.    Medical History:     Past Medical History:   Diagnosis Date    Cancer (HCC)     lung with mets    History of DVT (deep vein thrombosis)     Hx of blood clots     Snores     Thyroid disease        Surgical History:     Past Surgical History:   Procedure Laterality Date    CYST REMOVAL  2010    DENTAL SURGERY      extractions    IR PORT PLACEMENT EQUAL OR GREATER THAN 5 YEARS  9/15/2023    IR PORT PLACEMENT EQUAL OR GREATER THAN 5 YEARS 9/15/2023 [facility]    NECK SURGERY N/A 10/15/2024    NECK LESION BIOPSY EXCISION performed by Arjun Cheney MD at [facility] OR    US LYMPH NODE BIOPSY  8/15/2023    US LYMPH NODE BIOPSY 8/15/2023 [facility] ULTRASOUND       Family History:     Family History   Problem Relation Age of Onset    Leukemia Mother 86        AML    Heart Disease Father         passed away at 65    Other Father         carotid artery disease    Heart Disease Brother     Heart Attack Brother 45       Allergies:       Patient has no known allergies.    Medications:      Current Facility-Administered Medications   Medication Dose Route Frequency Provider Last Rate Last Admin    ipratropium 0.5 mg-albuterol 2.5 mg (DUONEB) nebulizer solution 1 Dose  1 Dose Inhalation Q20 Min PRN Noel Bullard MD   1 Dose at 10/19/24 0220    Or    albuterol (PROVENTIL) (2.5 MG/3ML) 0.083% nebulizer solution 2.5 mg  2.5 mg Nebulization Q20    Eyes:      Extraocular Movements: Extraocular movements intact.   Cardiovascular:      Rate and Rhythm: Normal rate and regular rhythm.      Comments: Palpable radial and DP pulses bilaterally   Pulmonary:      Effort: Pulmonary effort is normal. No respiratory distress.      Comments: On NC  Abdominal:      General: There is distension.      Palpations: Abdomen is soft.      Tenderness: There is no abdominal tenderness.   Skin:     General: Skin is warm and dry.      Capillary Refill: Capillary refill takes less than 2 seconds.   Neurological:      General: No focal deficit present.      Mental Status: He is alert and oriented to person, place, and time.         Imaging/Labs:     CT Result (most recent):  CT CHEST PULMONARY EMBOLISM W CONTRAST 10/19/2024 (Preliminary)  This result has not been signed. Information might be incomplete.    Impression  1. Small pulmonary embolism in a subsegmental branch of the upper lobar  branch of right pulmonary artery.  2. No additional pulmonary embolism.  3. Mild to moderate COPD.  4. Moderate amount of ascites in upper abdomen.  5. Multiple ill-defined hypodense areas in the visualized upper and  midportion of liver, suggestive of scattered metastatic lesions but not  optimally evaluated. Surface of liver appears to be mildly nodular.  On the  PET-CT scan on 10/04/2024, there was evidence of multiple metastatic lesions  in liver.  Findings of pulmonary embolism have been discussed by me with Dr. Noel Bullard, at 3:27 a.m. on 10/19/2024.        Assessment and Plan:     64 y/o male with history of lung adenocarcinoma with metastasis presents with complaints of SOB. He has a right upper subsegmental PE as well as NAZIA and several electrolyte abnormalities    -No urgent vascular surgery intervention required at this time  -Recommend continuation of heparin gtt  -Lifelong anticoagulation, transition to NOAC when able  -Vascular surgery will follow along    Electronically signed by

## 2024-10-19 NOTE — ED NOTES
The following labs labeled with pt sticker and tubed to lab:     [] Blue     [] Lavender   [] on ice  [] Green/yellow  [] Green/black [] on ice  [] Yellow  [] Red  [] Pink      [] COVID-19 swab    [] Rapid  [] PCR  [] Flu Swab  [] Strep Swab  [] Peds Viral Panel     [] Urine Sample  [] Pelvic Cultures  [x] Blood Cultures x2  [] Wound Cultures

## 2024-10-19 NOTE — RT PROTOCOL NOTE
RT Inhaler-Nebulizer Bronchodilator Protocol Note    There is a bronchodilator order in the chart from a provider indicating to follow the RT Bronchodilator Protocol and there is an “Initiate RT Inhaler-Nebulizer Bronchodilator Protocol” order as well (see protocol at bottom of note).    CXR Findings:  XR CHEST PORTABLE    Result Date: 10/19/2024  1. No acute cardiopulmonary process. 2. Left Port-A-Cath placement.       The findings from the last RT Protocol Assessment were as follows:   History Pulmonary Disease: Smoker 15 pack years or more  Respiratory Pattern: Dyspnea on exertion or RR 21-25 bpm  Breath Sounds: Slightly diminished and/or crackles  Cough: Strong, spontaneous, non-productive  Indication for Bronchodilator Therapy: Decreased or absent breath sounds  Bronchodilator Assessment Score: 5    Aerosolized bronchodilator medication orders have been revised according to the RT Inhaler-Nebulizer Bronchodilator Protocol below.    Respiratory Therapist to perform RT Therapy Protocol Assessment initially then follow the protocol.  Repeat RT Therapy Protocol Assessment PRN for score 0-3 or on second treatment, BID, and PRN for scores above 3.    No Indications - adjust the frequency to every 6 hours PRN wheezing or bronchospasm, if no treatments needed after 48 hours then discontinue using Per Protocol order mode.     If indication present, adjust the RT bronchodilator orders based on the Bronchodilator Assessment Score as indicated below.  Use Inhaler orders unless patient has one or more of the following: on home nebulizer, not able to hold breath for 10 seconds, is not alert and oriented, cannot activate and use MDI correctly, or respiratory rate 25 breaths per minute or more, then use the equivalent nebulizer order(s) with same Frequency and PRN reasons based on the score.  If a patient is on this medication at home then do not decrease Frequency below that used at home.    0-3 - enter or revise RT  bronchodilator order(s) to equivalent RT Bronchodilator order with Frequency of every 4 hours PRN for wheezing or increased work of breathing using Per Protocol order mode.        4-6 - enter or revise RT Bronchodilator order(s) to two equivalent RT bronchodilator orders with one order with BID Frequency and one order with Frequency of every 4 hours PRN wheezing or increased work of breathing using Per Protocol order mode.        7-10 - enter or revise RT Bronchodilator order(s) to two equivalent RT bronchodilator orders with one order with TID Frequency and one order with Frequency of every 4 hours PRN wheezing or increased work of breathing using Per Protocol order mode.       11-13 - enter or revise RT Bronchodilator order(s) to one equivalent RT bronchodilator order with QID Frequency and an Albuterol order with Frequency of every 4 hours PRN wheezing or increased work of breathing using Per Protocol order mode.      Greater than 13 - enter or revise RT Bronchodilator order(s) to one equivalent RT bronchodilator order with every 4 hours Frequency and an Albuterol order with Frequency of every 2 hours PRN wheezing or increased work of breathing using Per Protocol order mode.     RT to enter RT Home Evaluation for COPD & MDI Assessment order using Per Protocol order mode.    Electronically signed by Dolly Elizalde RCP on 10/19/2024 at 5:45 AM

## 2024-10-19 NOTE — ED NOTES
Pt called out due to pain in IV site with Vancomycin infusing  RN to room. Vancomycin paused  IV appears to have infiltrated  IV removed  Pharmacy called to inform of infiltration. Per pharmacy, apply warm compress and reassess  Warm compress applied

## 2024-10-20 NOTE — PROGRESS NOTES
New orders received Give 2 amps dextrose with 10 units regular insulin for K+ 6.1 per Dr Pelaez also need Dialysis done this morning

## 2024-10-20 NOTE — PROGRESS NOTES
Today's Date: 10/20/2024  Patient Name: Jony Portillo  Date of admission: 10/19/2024  1:44 AM  Patient's age: 63 y.o., 1960  Admission Dx: Hyperkalemia [E87.5]  Hyponatremia [E87.1]  NAZIA (acute kidney injury) (HCC) [N17.9]  Acute pulmonary embolism without acute cor pulmonale, unspecified pulmonary embolism type (HCC) [I26.99]    Reason for Consult: Metastatic lung cancer on chemotherapy/pulm embolism  Requesting Physician: Eron Monahan MD    CHIEF COMPLAINT: Weakness and shortness of breath    History Obtained From:  patient    Interval history  Seen and examined  Creatinine rising  No evidence of hemolysis  WBC up to 35  High reticulocyte count    HISTORY OF PRESENT ILLNESS:    The patient is a 63 y.o.  male who is admitted to the hospital for weakness and shortness of breath of 4 days duration in emergency room blood pressure was low and heart rate in the 80s labs did show sodium at 117 with potassium 7.3 and creatinine 4.4 with WBC 28  CT PE showed small pulmonary embolism in the subsegmental branch of upper lobe laterally right pulmonary artery, mild to moderate COPD: Scattered metastatic lesions   Patient well-known to our service she had metastatic adenocarcinoma of the lung oncology history as below      DIAGNOSIS:   Multiple cervical adenopathy  Biopsy showed adenocarcinoma suggestive of lung primary.  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  Good response to 4 cycles of combination therapy, plan to stop carboplatin and continue with Alimta  [facility]                            This note is created with the assistance of a speech recognition program.  While intending to generate a document that actually reflects the content of the visit, the document can still have some errors including those of syntax and sound a like substitutions which may escape proof reading.  It such instances, actual meaning can be extrapolated by contextual diversion.     Hematologist/Medical Oncologist

## 2024-10-20 NOTE — PROGRESS NOTES
Dialysis Time Out  To be done by RN and tech or 2 RNs  Staff Names Kedar SABILLON RN & Melissa VERAS RN    [x]  Identity of the patient using 2 patient identifiers  [x]  Consent for treatment  [x]  Equipment-proper machine and dialyzer  [x]  B-Hep B status (10/19/24 HBsAg Neg)  [x]  Orders- to include bath, blood flow, dialyzer, time and fluid removal  [x]  Access-Correct site and in working order  [x]  Time for patient to ask questions.

## 2024-10-20 NOTE — PROGRESS NOTES
64 yo male with history of metastatic lung cancer that presented with NAZIA and severe electrolyte abnormalities found to have small right upper lobe PE. This does not require any vascular surgical intervention. We recommend lifelong PO anticoagulation. May transition to PO when able.     No further surgery intervention indicated or planned at this time.      We will sign off at this time.  Please reconsult/call if additional questions, concerns, or issues develop requiring further surgery input.     Thank you for this interesting consult.    Electronically signed by Cori Andrade DO on 10/20/2024 at 9:56 AM

## 2024-10-20 NOTE — RT PROTOCOL NOTE
RT Nebulizer Bronchodilator Protocol Note    There is a bronchodilator order in the chart from a provider indicating to follow the RT Bronchodilator Protocol and there is an “Initiate RT Bronchodilator Protocol” order as well (see protocol at bottom of note).    CXR Findings:  XR CHEST PORTABLE    Result Date: 10/20/2024  1.  No acute abnormality.     XR CHEST PORTABLE    Result Date: 10/19/2024  1. No acute cardiopulmonary process. 2. Left Port-A-Cath placement.       The findings from the last RT Protocol Assessment were as follows:  Smoking: Smoker 15 pack years or more  Respiratory Pattern: Mild dyspnea at rest, irregular pattern, or RR 21-25 bpm  Breath Sounds: Inspiratory and expiratory or bilateral wheezing and/or rhonchi  Cough: Strong, spontaneous, non-productive  Indication for Bronchodilator Therapy: Wheezing associated with pulm disorder (wheezing)  Bronchodilator Assessment Score: 11    Aerosolized bronchodilator medication orders have been revised according to the RT Nebulizer Bronchodilator Protocol below.    Respiratory Therapist to perform RT Therapy Protocol Assessment initially then follow the protocol.  Repeat RT Therapy Protocol Assessment PRN for score 0-3 or on second treatment, BID, and PRN for scores above 3.    No Indications - adjust the frequency to every 6 hours PRN wheezing or bronchospasm, if no treatments needed after 48 hours then discontinue using Per Protocol order mode.     If indication present, adjust the RT bronchodilator orders based on the Bronchodilator Assessment Score as indicated below.  If a patient is on this medication at home then do not decrease Frequency below that used at home.    0-3 - enter or revise RT bronchodilator order(s) to equivalent RT Bronchodilator order with Frequency of every 4 hours PRN for wheezing or increased work of breathing using Per Protocol order mode.       4-6 - enter or revise RT Bronchodilator order(s) to two equivalent RT bronchodilator  orders with one order with BID Frequency and one order with Frequency of every 4 hours PRN wheezing or increased work of breathing using Per Protocol order mode.         7-10 - enter or revise RT Bronchodilator order(s) to two equivalent RT bronchodilator orders with one order with TID Frequency and one order with Frequency of every 4 hours PRN wheezing or increased work of breathing using Per Protocol order mode.       11-13 - enter or revise RT Bronchodilator order(s) to one equivalent RT bronchodilator order with QID Frequency and an Albuterol order with Frequency of every 4 hours PRN wheezing or increased work of breathing using Per Protocol order mode.      Greater than 13 - enter or revise RT Bronchodilator order(s) to one equivalent RT bronchodilator order with every 4 hours Frequency and an Albuterol order with Frequency of every 2 hours PRN wheezing or increased work of breathing using Per Protocol order mode.     RT to enter RT Home Evaluation for COPD & MDI Assessment order using Per Protocol order mode.    Electronically signed by LENY BUNDY RCP on 10/20/2024 at 4:42 PM

## 2024-10-20 NOTE — PROGRESS NOTES
Nephrology Progress Note    Subjective: ,     Patient seen and examined, was a new consult from yesterday for NAZIA initiated on dialysis.   Ran yesterday well tolerated, no fluid removal.   WBC now 35.7 Na 120 K 6.1 chloride 86 CO2 15  Cr 4.2, AG 19, lactic acid 3.1 glucose 86, calcium 8.3.   Given dextrose, insulin earlier this am.  SBP  range HR stable. No fevers.   C/o poor sleep, no chest pain or sob.     Objective:     Constitutional:    CURRENT TEMPERATURE:  Temp: 97.9 °F (36.6 °C)  MAXIMUM TEMPERATURE OVER 24HRS:  Temp (24hrs), Av.4 °F (36.3 °C), Min:96.3 °F (35.7 °C), Max:98.2 °F (36.8 °C)    CURRENT RESPIRATORY RATE:  Respirations: 21  CURRENT PULSE:  Pulse: 95  CURRENT BLOOD PRESSURE:  BP: 96/68  24HR BLOOD PRESSURE RANGE:  Systolic (24hrs), Av , Min:65 , Max:124   ; Diastolic (24hrs), Av, Min:48, Max:83    24HR INTAKE/OUTPUT:    Intake/Output Summary (Last 24 hours) at 10/20/2024 0939  Last data filed at 10/20/2024 0428  Gross per 24 hour   Intake 1273.92 ml   Output 705 ml   Net 568.92 ml       Labs   CBC:   Recent Labs     10/19/24  0156 10/19/24  0422 10/19/24  0637 10/19/24  2120 10/20/24  0319   WBC 32.5* 28.6*  --   --  35.7*   RBC 3.82* 3.35*  --   --  3.25*   HGB 11.2* 9.9* 9.6* 9.6* 9.6*   HCT 33.1* 30.0* 28.1* 27.8* 29.3*   MCV 86.6 89.6  --   --  90.2   MCH 29.3 29.6  --   --  29.5   MCHC 33.8 33.0  --   --  32.8   RDW 17.0* 17.2*  --   --  17.5*   * See Reflexed IPF Result  --   --  See Reflexed IPF Result   MPV 12.5  --   --   --   --       BMP:   Recent Labs     10/19/24  0637 10/19/24  2120 10/20/24  0319   * 120* 120*   K 7.0* 5.9* 6.1*   CL 82* 86* 86*   CO2 17* 15* 15*   * 94* 101*   CREATININE 4.6* 4.0* 4.2*   GLUCOSE 89 95 86   CALCIUM 7.6* 8.4* 8.3*        Urinalysis:  U/A:   Lab Results   Component Value Date/Time    NITRU NEGATIVE 10/19/2024 03:26 AM    COLORU Dark Yellow 10/19/2024 03:26 AM    PHUR 5.0 10/19/2024 03:26 AM    WBCUA 0 TO 2

## 2024-10-20 NOTE — PLAN OF CARE
Problem: Safety - Adult  Goal: Free from fall injury  Outcome: Progressing     Problem: Discharge Planning  Goal: Discharge to home or other facility with appropriate resources  Outcome: Progressing     Problem: Pain  Goal: Verbalizes/displays adequate comfort level or baseline comfort level  10/20/2024 1707 by Hamida Ochoa RN  Outcome: Progressing  10/20/2024 0413 by Jessica Sandhu RN  Outcome: Progressing  Flowsheets (Taken 10/19/2024 2000)  Verbalizes/displays adequate comfort level or baseline comfort level: Encourage patient to monitor pain and request assistance     Problem: Skin/Tissue Integrity  Goal: Absence of new skin breakdown  Description: 1.  Monitor for areas of redness and/or skin breakdown  2.  Assess vascular access sites hourly  3.  Every 4-6 hours minimum:  Change oxygen saturation probe site  4.  Every 4-6 hours:  If on nasal continuous positive airway pressure, respiratory therapy assess nares and determine need for appliance change or resting period.  Outcome: Progressing     Problem: ABCDS Injury Assessment  Goal: Absence of physical injury  Outcome: Progressing     Problem: Nutrition Deficit:  Goal: Optimize nutritional status  Outcome: Progressing

## 2024-10-20 NOTE — PROGRESS NOTES
Critical Care Team - Daily Progress Note      Date and time: 10/20/2024 8:03 AM  Patient's name:  Jony Portillo  Medical Record Number: [id number]  Patient's account/billing number: [id number]  Patient's YOB: 1960  Age: 63 y.o.  Date of Admission: 10/19/2024  1:44 AM  Length of stay during current admission: 1      Primary Care Physician: Keisha Hackett MD  ICU Attending Physician:      Code Status: Full Code    Reason for ICU admission:   Chief Complaint   Patient presents with    Fatigue         SUBJECTIVE:     OVERNIGHT EVENTS:       NAEON  Had dialysis session yesterday with 0 net removal  K 6.1 post dialysis Nephrology gave insulin and dextrose  Na 120, Cr 4.2  remains on bicarb gtt 50 ml/hr  Lactic acid 2.3 > 2.2 > 3.8 > 3.1  WBC 32>28>35 a febrile, on cefepime  Plt 74 > 103, hematology to investigate ITP        Brief history  Jony Portillo is a 63 y.o. with PMH of   -Adenocarcinoma of lung with metastasis to liver, bone, lymph nodes-on chemotherapy   -Hypothyroidism  -Right leg DVT 7/23     Presented to ER with generalized weakness and shortness of breath for last 3 to 4 days, patient felt weak while going to restroom and lowered himself to the ground, no loss of consciousness, associated with poor appetite  In the ER patient was hypothermic with temperature 96.3, blood pressure 99/63, heart rate in the 80s, maintaining saturation on room air  Labs showed hyponatremia with sodium of 117, potassium 7.3, bicarb 12, anion gap 20, creatinine 4.4, WBC 28, urine osmolality 338, urine sodium less than 20  CT PE showed small pulmonary embolism in the subsegmental branch of upper lobe laterally right pulmonary artery, mild to moderate COPD: Scattered metastatic lesions     Additionally patient underwent lesion biopsy on 10/15 and was given Bactrim    AWAKE & FOLLOWING COMMANDS:  [] No   [x] Yes    CURRENT VENTILATION STATUS:     [] Ventilator  [] BIPAP  [] Nasal Cannula [x] Room Air      IF  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 (HCC)    Unintentional weight loss of more than 10 pounds in 90 days    Elevated blood sugar    Left ankle swelling    Other fatigue    Cellulitis of left foot    Malignant neoplasm of upper lobe of right lung (HCC)    Metastasis to cervical lymph node (HCC)    Malignant neoplasm of lung (HCC)    Small bowel obstruction (HCC)    Small bowel obstruction, partial (HCC)    NAZIA (acute kidney injury) (HCC)    Leukocytosis    Normocytic anemia    Hyponatremia    Hyperkalemia    Metabolic acidosis    Pulmonary embolism (HCC)    Elevated lactic acid level          PLAN:     NAZIA with hyperkalemia  - Likely secondary to poor intake, patient was also was on steroids on and off, I will check cortisol level to rule out adrenal insufficiency  - Patient received insulin dextrose in the ER, will start bicarb drip and repeat electrolytes in 2 hours  - Serial sodium check  - Monitor strict input and output  - HD session yesterday with net 0 removed  - planned for another dialysis session  - No hyperkalemia EKG changes     Malignant neoplasm of upper lobe of right lung  Pulmonary embolism  Shortness of breath  - Patient has a history of DVT and now in the setting of lung cancer it is likely provoked  - Vascular surgery: -patient will need lifelong anticoagulation  - Consult heme-onc  Monitor lactic acid  - H&H every 6 hourly     Hypothermia and leukocytosis meeting sepsis criteria  - Follow-up on a blood culture, trend CRP and   - procalcitonin  - Empiric antibiotics     Hypothyroidism  - Resume levothyroxine 75 mcg            Cecelia Hanna MD, MPAULO.             Department of Internal Medicine/ Critical care  [facility], [facility])             10/20/2024, 8:03 AM Attending Physician Statement  I have discussed the care of Jony Portillo, including pertinent history and exam findings,  with the resident. I have seen and examined the patient and the key elements of all

## 2024-10-20 NOTE — PROGRESS NOTES
Comprehensive Nutrition Assessment    Type and Reason for Visit:  Initial, Positive Nutrition Screen    Nutrition Recommendations/Plan:   Continue current diet as tolerated  Provide high kcal, high pro ONS - strawberry - TID with meals to support poor PO intake  Monitor PO and ONS intake, wt, labs, meds, edema, POC     Malnutrition Assessment:  Malnutrition Status:  Moderate malnutrition (10/20/24 1332)    Context:  Chronic Illness     Findings of the 6 clinical characteristics of malnutrition:  Energy Intake:  75% or less estimated energy requirements for 1 month or longer  Weight Loss:  No significant weight loss     Body Fat Loss:  Mild body fat loss Orbital   Muscle Mass Loss:  Mild muscle mass loss Temples (temporalis)  Fluid Accumulation:  Mild (to moderate) Extremities   Strength:  Not Performed    Nutrition Assessment:    Positive MST for wt loss and poor appetite. Spoke with pt and his wife at tme of visit. Pt receiving dialysis at time of visit. Pt reported his UBW was 210lbs and is currently 168lbs. Noticed the wt loss from 210lbs over the last year and a half since he had cancer and underwent chemo treatment. Began Chemo August 2023. Per wt hx, pt's wt has been stable around 168lbs. Appetite has been poor for a while reporting \"the swelling\" from the edema has contributed to him not wanting to eat. Pt stated he did not receive breakfast this morning nor had an ambassador taken meal orders for the day. Pt's wife stated the RN called dietary for an ambassador and tray, but still had yet to receive one. Writer called down and spoke with dietary manager Shikha who stated she was sending one over to pt's room. Pt prefers no meat as he has truble digesting it as well as no eggs - writer documented in food preferences.    Nutrition Related Findings:    Meds/labs reviewed; +2 pitting RLE and +1 pitting LLE edema Wound Type: Skin Tears (and abrasion)       Current Nutrition Intake & Therapies:    Average Meal

## 2024-10-20 NOTE — PROGRESS NOTES
Pharmacy Note     Renal Dose Adjustment    Jony Portillo is a 63 y.o. male. Pharmacist assessment of renally cleared medications.    Recent Labs     10/20/24  0319 10/20/24  1000   * 104*       Recent Labs     10/20/24  0319 10/20/24  1000   CREATININE 4.2* 4.5*       Estimated Creatinine Clearance: 18 mL/min (A) (based on SCr of 4.5 mg/dL (H)).  HD - new start    Height:   Ht Readings from Last 1 Encounters:   10/19/24 1.88 m (6' 2\")     Weight:  Wt Readings from Last 1 Encounters:   10/20/24 76.3 kg (168 lb 3.4 oz)       The following medication dose has been adjusted based upon renal function per P&T Guidelines:             Cefepime 2g Q24 changed to 1g Q24    Danisha Brooks PharmD, BCCCP  10/20/2024  1:13 PM

## 2024-10-20 NOTE — PLAN OF CARE
Problem: Pain  Goal: Verbalizes/displays adequate comfort level or baseline comfort level  Outcome: Progressing  Flowsheets (Taken 10/19/2024 2000)  Verbalizes/displays adequate comfort level or baseline comfort level: Encourage patient to monitor pain and request assistance     Problem: Discharge Planning  Goal: Discharge to home or other facility with appropriate resources  Recent Flowsheet Documentation  Taken 10/19/2024 2000 by Jessica Sandhu RN  Discharge to home or other facility with appropriate resources: Identify barriers to discharge with patient and caregiver

## 2024-10-20 NOTE — PROGRESS NOTES
Dialysis Post Treatment Note  Vitals:    10/20/24 1357   BP: 100/67   Pulse: 95   Resp: 26   Temp: 97 °F (36.1 °C)   SpO2: 100%     Pre-Weight = 76.3kg  Post-weight = Weight - Scale: 75.3 kg (166 lb 0.1 oz)  Total Liters Processed = Blood Volume Processed (Liters): 52 L  Rinseback Volume (mL) = Rinseback Volume (ml): 260 ml  Net Removal (mL) =  518 mL  Patient's dry weight= TBD  Type of access used= Left Fem Cath  Length of treatment=183      2nd HD tolerated fair with 0.5L removed. BP soft throughout requiring Midodrine & Albumin. No other issues. Fem cath worked well. Report given to primary RN Melissa VERAS

## 2024-10-21 PROBLEM — Z51.5 ENCOUNTER FOR PALLIATIVE CARE: Status: ACTIVE | Noted: 2024-01-01

## 2024-10-21 PROBLEM — C78.7 ADENOCARCINOMA OF LUNG METASTATIC TO LIVER (HCC): Status: ACTIVE | Noted: 2024-01-01

## 2024-10-21 PROBLEM — Z71.89 ACP (ADVANCE CARE PLANNING): Status: ACTIVE | Noted: 2024-01-01

## 2024-10-21 PROBLEM — R18.8 OTHER ASCITES: Status: ACTIVE | Noted: 2024-01-01

## 2024-10-21 PROBLEM — C34.90 ADENOCARCINOMA OF LUNG METASTATIC TO LIVER (HCC): Status: ACTIVE | Noted: 2024-01-01

## 2024-10-21 NOTE — PROGRESS NOTES
[facility]     Department of Internal Medicine - Staff Internal Medicine Service   ICU PATIENT TRANSFER NOTE        Patient:  Jony Portillo  YOB: 1960  MRN: [id number]     Acct: [id number]     Admit date: 10/19/2024    Code Status:-  Full code     Reason for ICU Admission:-       SUPPORT DEVICES: [] Ventilator [] BIPAP  [] Nasal Cannula [x] Room Air    Consultations:- [] Cardiology [x] Nephrology  [x] Hemo onco  [] GI                               [] ID [] ENT  [] Rheum [] Endo   []Physiotherapy                                 Others:-     NUTRITION:  [] NPO [] Tube Feeding (Specify: ) [] TPN  [] PO    Central Lines:- [x] No   [] Yes           If yes - Days/Date of Insertion.  Hemodialysis catheter    Pt seen,examined and Chart reviewed.    ICU COURSE:    The patient is a 63 y.o. male with past medical history significant for   Adenocarcinoma of lung with metastasis to liver, bone, lymph nodes on chemotherapy  Hypothyroidism  Right leg DVT  Small bowel obstruction.    who was initially admitted on 10/19/2024 with Hyperkalemia [E87.5]  Hyponatremia [E87.1]  NAZIA (acute kidney injury) (HCC) [N17.9]  Acute pulmonary embolism without acute cor pulmonale, unspecified pulmonary embolism type (HCC) [I26.99] and presented with generalized weakness and shortness of breath for the 3 to 4 days.  Patient stated he felt weak when going to restroom elevated blood noted on    Per my colleague in the ER patient was hypothermic with temperature, 96.3, blood pressure 99/63, heart rate in the 80s, maintaining saturation on room air. Labs showed hyponatremia with sodium of 117 , potassium of 7.3, bicarb of 12, anion gap pf 20, creatinine of 4.4, wbc 28, urine osmolality 338, urine sodium less than 20.  Platelets of 133  CT PE was done which showed small pulmonary embolism in the subsegmental branch of the upper lobar branch of right pulmonary artery, mild to moderate COPD: Moderate amount of ascites

## 2024-10-21 NOTE — PROGRESS NOTES
have reviewed the key elements of all parts of the encounter with the resident. I have seen and examined the patient with the resident.  I agree with the assessment and plan and status of the problem list as documented.    I saw the patient during around today, chart reviewed overnight events noted.  Patient was admitted with hyponatremia, hyperkalemia, acute renal failure and had severe hyponatremia has been followed by nephrology now he is on dialysis.  His systolic blood pressure is above 90s and 100 he started on midodrine he did tolerate hemodialysis today he is not on pressors.  His sodium is 124 today last potassium 5.3 this morning before hemodialysis BUN was 75 and creatinine is 3.9 WBC is 13 and hemoglobin is 9.1.  He is currently on cefepime empirically.  He is on DuoNeb aerosol here.  He is on room air maintaining saturation  He will likely need paracentesis and sent for cytology and chemistry.  Follow-up with nephrology.  Follow-up sodium and renal function potassium continue with midodrine.  Will transfer patient to stepdown unit with medicine and once in the stepdown unit critical care team will sign off.  Discussed with nursing staff, treatment and plan discussed.  Discussed with respiratory therapist.    Total critical care time caring for this patient with life threatening, unstable organ failure, including direct patient contact, management of life support systems, review of data including imaging and labs, discussions with other team members and physicians at least 35  Min so far today, excluding procedures.       This note is created with the assistance of a speech recognition program.  While intent was to generate a document that actually reflects the content of the visit, the document can still have some errors including those of syntax and sound-alike substitutions which may escape proof reading.  It such instances, actual meaning can be extrapolated by contextual diversion.     Michele Hampton,

## 2024-10-21 NOTE — PROGRESS NOTES
NEPHROLOGY PROGRESS NOTE      ASSESSMENT     Acute kidney injury oligoanuric secondary to  ATN from volume depletion (poor p.o./hypotension) and Alimta NSAID// Bactrim use -hemodialysis dependent since 19th.  Presented with BUN of 118 with creatinine of 4.6.  Baseline creatinine normal.  Doubt TLS or microangiopathy  Hyperkalemia with potassium of 7 on admission secondary to acute kidney injury  High anion gap metabolic acidosis secondary to acute kidney injury  Hyponatremia secondary to oligoanuric acute renal failure/ADH mediated appropriate reflected by urine studies from volume depletion.  Underlying SIADH not excluded completely  Acute pulmonary embolism/DVT on anticoagulation  Metastatic upper lobe adenocarcinoma of the lung  Anemia/thrombocytopenia with no evidence of hemolysis    PLAN     Seen and examined on hemodialysis.  Orders reviewed with nursing staff.  Continue to hold Bactrim/meloxicam and other nephrotoxic medications  Check phosphorus/calcium/uric acid  Tunnel catheter placement next 24 to 48 hours  Discontinue Louis catheter      SUBJECTIVE     Known case of metastatic adenocarcinoma of lung on chemotherapy with Alimta presented with loss of appetite/effort intolerance and shortness of breath.  Initial assessment showed hypotensive gentleman with investigations demonstrating acute renal injury/hyperkalemia/metabolic acidosis.  He received fluid resuscitation to optimize his blood pressure but renal function continued to worsen prompting initiation of dialysis    Currently patient is on hemodialysis.  Hypotension prohibiting ultrafiltration.  Urine output not the greatest.   appetite suboptimal.    OBJECTIVE     Vitals:    10/21/24 0915 10/21/24 0930 10/21/24 0936 10/21/24 0945   BP:  (!) 82/56 (!) 89/60 (!) 86/56   Pulse:  93 98 97   Resp:       Temp:       TempSrc:       SpO2:       Weight: 79 kg (174 lb 2.6 oz)      Height:         24HR INTAKE/OUTPUT:    Intake/Output Summary (Last 24 hours) at  10/21/2024 0959  Last data filed at 10/21/2024 0400  Gross per 24 hour   Intake 871.95 ml   Output 881 ml   Net -9.05 ml       General appearance:Awake, alert, in no acute distress  HEENT: PERRLA  Respiratory::vesicular breath sounds,no wheeze/crackles  Cardiovascular:S1 S2 normal,no gallop or organic murmur.  Abdomen:Non tender/non distended.Bowel sounds present  Extremities: No Cyanosis or Clubbing,Lower extremity edema  Neurological:Alert and oriented.No abnormalities of mood, affect, memory, mentation, or behavior are noted      MEDICATIONS     Scheduled Meds:    cefepime  1,000 mg IntraVENous Q24H    docusate sodium  100 mg Oral Daily    oxyCODONE  5 mg Oral Nightly    ipratropium 0.5 mg-albuterol 2.5 mg  1 Dose Inhalation 4x Daily RT    sodium chloride flush  5-40 mL IntraVENous 2 times per day    levothyroxine  75 mcg Oral Daily    famotidine  20 mg Oral Daily     Continuous Infusions:    sodium chloride 50 mL/hr at 10/20/24 2156    dextrose      heparin (PORCINE) Infusion 17 Units/kg/hr (10/21/24 0203)    sodium chloride       PRN Meds:  midodrine, melatonin, albumin human 25%, albuterol, glucose, dextrose bolus **OR** dextrose bolus, glucagon (rDNA), dextrose, heparin (porcine), heparin (porcine), sodium chloride flush, sodium chloride, ondansetron **OR** ondansetron, polyethylene glycol, acetaminophen **OR** acetaminophen  Home Meds:                Medications Prior to Admission: cephALEXin (KEFLEX) 500 MG capsule, 500 mgTake three times daily  sulfamethoxazole-trimethoprim (BACTRIM DS) 800-160 MG per tablet, Take 1 tablet by mouth 2 times daily for 10 days  omeprazole (PRILOSEC OTC) 20 MG tablet, Take 1 tablet by mouth daily  ondansetron (ZOFRAN) 8 MG tablet, Take 1 tablet by mouth every 8 hours as needed for Nausea or Vomiting Can take it sublingually if needed  dexAMETHasone (DECADRON) 4 MG tablet, Take 5 tabs by mouth (with food) the evening before each chemo  meloxicam (MOBIC) 15 MG tablet, Take 1

## 2024-10-21 NOTE — PROGRESS NOTES
Dialysis Post Treatment Note  Vitals:    10/21/24 1500   BP: (!) 98/58   Pulse: 94   Resp: 23   Temp:    SpO2: 98%     Pre-Weight = 7KG  Post-weight = Weight - Scale: 78 kg (171 lb 15.3 oz)  Total Liters Processed = Blood Volume Processed (Liters): 52 L  Rinseback Volume (mL) = Rinseback Volume (ml): 260 ml  Net Removal (mL) =  1000mL  Type of access used=fem cath  Length of treatment=3.5 hours    Pt tolerated tx fairly well, hypotensive t/o treatment.  Medications administered per MAR.

## 2024-10-21 NOTE — ACP (ADVANCE CARE PLANNING)
Advance Care Planning      Palliative Medicine Provider (MD/NP)  Advance Care Planning (ACP) Conversation      Date of Conversation: 10/21/24  The patient and/or authorized decision maker consented to a voluntary Advance Care Planning conversation.   Individuals present for the conversation:   Patient with decision making capacity    Legal Healthcare Agent(s):    Primary Decision Maker: Connie Portillo - Spouse - [phone]    ACP documents available in EMR prior to discussion:  None    Primary Palliative Diagnosis(es):  Adenocarcinoma of the lung    Conversation Summary:  He would want full resuscitation.  Wife is HCPOA.    Resuscitation Status:    Code Status: Full Code    Outcomes / Completed Documentation:  An explanation of advance directives and their importance was provided and the following forms completed:    -No new documents completed.    If new document completed, original was provided to patient and/or family member.    Copy was placed for scanning into the [facility] EMR.      I spent 10 minutes providing separately identifiable ACP services with the patient and/or surrogate decision maker in a voluntary, in-person conversation discussing the patient's wishes and goals as detailed in the above note.       FRANCA FERRER, DO

## 2024-10-21 NOTE — PROGRESS NOTES
Physician Progress Note      PATIENT:               FRANCO BEAL  CSN #:                  [id number]  :                       1960  ADMIT DATE:       10/19/2024 1:44 AM  DISCH DATE:  RESPONDING  PROVIDER #:        LINA RAY          QUERY TEXT:    Patient admitted  on 10/19 with NAZIA.  Noted documentation of  meets sepsis in   H&P and progress notes 10/20 and 10/21 along with empiric antibiotics. per   review  WBC 28.6>32.5>35.7>30.7 lactic acid 2.2>3.8>4.4>3.9 creat   4.4>3.9>4.5>3.4. ordered on iv cefepime. Please clarify one of the following :    The medical record reflects the following:  Risk Factors: age of 63, NAZIA pulmonary embolism  Clinical Indicators: n 10/19 with NAZIA.  Noted documentation of  meets sepsis   in H&P and progress notes 10/20 and 10/21 along with empiric antibiotics. per   review  WBC 28.6>32.5>35.7>30.7 lactic acid 2.2>3.8>4.4>3.9 creat   4.4>3.9>4.5>3.4.. cxr no acute findings UA negative for leukocyte  Treatment: ICU monitoring, iv Cefepime, wbc and lactic acid monitoring    Thank You Poncho VARGAS BSN CCDS  Options provided:  -- Sepsis, confirmed due to, Please document source.  -- Sepsis confirmed unknown source  -- Sepsis ruled out after study , SIRS with acute organ dysfunction confirmed  -- Other - I will add my own diagnosis  -- Disagree - Not applicable / Not valid  -- Disagree - Clinically unable to determine / Unknown  -- Refer to Clinical Documentation Reviewer    PROVIDER RESPONSE TEXT:    this patient has sepsis due to unknown source which has been confirmed    Query created by: Pamela Conley on 10/21/2024 9:22 AM      Electronically signed by:  LINA RAY 10/21/2024 4:01 PM

## 2024-10-21 NOTE — PLAN OF CARE
Problem: Safety - Adult  Goal: Free from fall injury  Outcome: Progressing     Problem: Discharge Planning  Goal: Discharge to home or other facility with appropriate resources  Outcome: Progressing  Flowsheets (Taken 10/21/2024 6351)  Discharge to home or other facility with appropriate resources: Identify barriers to discharge with patient and caregiver     Problem: Pain  Goal: Verbalizes/displays adequate comfort level or baseline comfort level  Outcome: Progressing     Problem: Skin/Tissue Integrity  Goal: Absence of new skin breakdown  Description: 1.  Monitor for areas of redness and/or skin breakdown  2.  Assess vascular access sites hourly  3.  Every 4-6 hours minimum:  Change oxygen saturation probe site  4.  Every 4-6 hours:  If on nasal continuous positive airway pressure, respiratory therapy assess nares and determine need for appliance change or resting period.  Outcome: Progressing     Problem: ABCDS Injury Assessment  Goal: Absence of physical injury  Outcome: Progressing     Problem: Nutrition Deficit:  Goal: Optimize nutritional status  Outcome: Progressing

## 2024-10-21 NOTE — PROGRESS NOTES
Today's Date: 10/21/2024  Patient Name: Jony Portillo  Date of admission: 10/19/2024  1:44 AM  Patient's age: 63 y.o., 1960  Admission Dx: Hyperkalemia [E87.5]  Hyponatremia [E87.1]  NAZIA (acute kidney injury) (HCC) [N17.9]  Acute pulmonary embolism without acute cor pulmonale, unspecified pulmonary embolism type (HCC) [I26.99]    Reason for Consult: Metastatic lung cancer on chemotherapy/pulm embolism  Requesting Physician: Eron Monahan MD    CHIEF COMPLAINT: Weakness and shortness of breath    History Obtained From:  patient    Interval history  Seen and examined  Creatinine rising  No evidence of hemolysis  WBC improved to 30.7  Platelet and hemoglobin stable  High reticulocyte count  Abdomen tender  Poor appetite    HISTORY OF PRESENT ILLNESS:    The patient is a 63 y.o.  male who is admitted to the hospital for weakness and shortness of breath of 4 days duration in emergency room blood pressure was low and heart rate in the 80s labs did show sodium at 117 with potassium 7.3 and creatinine 4.4 with WBC 28  CT PE showed small pulmonary embolism in the subsegmental branch of upper lobe laterally right pulmonary artery, mild to moderate COPD: Scattered metastatic lesions   Patient well-known to our service she had metastatic adenocarcinoma of the lung oncology history as below      DIAGNOSIS:   Multiple cervical adenopathy  Biopsy showed adenocarcinoma suggestive of lung primary.  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  Good response to 4 cycles of

## 2024-10-21 NOTE — RT PROTOCOL NOTE
RT Inhaler-Nebulizer Bronchodilator Protocol Note    There is a bronchodilator order in the chart from a provider indicating to follow the RT Bronchodilator Protocol and there is an “Initiate RT Inhaler-Nebulizer Bronchodilator Protocol” order as well (see protocol at bottom of note).      The findings from the last RT Protocol Assessment were as follows:   History Pulmonary Disease: Smoker 15 pack years or more  Respiratory Pattern: Dyspnea on exertion or RR 21-25 bpm  Breath Sounds: Slightly diminished and/or crackles  Cough: Strong, spontaneous, non-productive  Bronchodilator Assessment Score: 5    Aerosolized bronchodilator medication orders have been revised according to the RT Inhaler-Nebulizer Bronchodilator Protocol below.    Respiratory Therapist to perform RT Therapy Protocol Assessment initially then follow the protocol.  Repeat RT Therapy Protocol Assessment PRN for score 0-3 or on second treatment, BID, and PRN for scores above 3.    No Indications - adjust the frequency to every 6 hours PRN wheezing or bronchospasm, if no treatments needed after 48 hours then discontinue using Per Protocol order mode.     If indication present, adjust the RT bronchodilator orders based on the Bronchodilator Assessment Score as indicated below.  Use Inhaler orders unless patient has one or more of the following: on home nebulizer, not able to hold breath for 10 seconds, is not alert and oriented, cannot activate and use MDI correctly, or respiratory rate 25 breaths per minute or more, then use the equivalent nebulizer order(s) with same Frequency and PRN reasons based on the score.  If a patient is on this medication at home then do not decrease Frequency below that used at home.    0-3 - enter or revise RT bronchodilator order(s) to equivalent RT Bronchodilator order with Frequency of every 4 hours PRN for wheezing or increased work of breathing using Per Protocol order mode.        4-6 - enter or revise RT

## 2024-10-21 NOTE — PLAN OF CARE
Problem: Safety - Adult  Goal: Free from fall injury  10/20/2024 2259 by Barbie Cantor RN  Outcome: Progressing  10/20/2024 1707 by Hamida Ochoa RN  Outcome: Progressing     Problem: Discharge Planning  Goal: Discharge to home or other facility with appropriate resources  10/20/2024 2259 by Barbie Cantor RN  Outcome: Progressing  Flowsheets (Taken 10/19/2024 2000 by Jessica Sandhu RN)  Discharge to home or other facility with appropriate resources: Identify barriers to discharge with patient and caregiver  10/20/2024 1707 by Hamida Ochoa RN  Outcome: Progressing     Problem: Pain  Goal: Verbalizes/displays adequate comfort level or baseline comfort level  10/20/2024 2259 by Barbie Cantor RN  Outcome: Progressing  Flowsheets (Taken 10/20/2024 2259)  Verbalizes/displays adequate comfort level or baseline comfort level:   Encourage patient to monitor pain and request assistance   Assess pain using appropriate pain scale   Administer analgesics based on type and severity of pain and evaluate response   Implement non-pharmacological measures as appropriate and evaluate response  10/20/2024 1707 by Hamida Ochoa RN  Outcome: Progressing     Problem: Skin/Tissue Integrity  Goal: Absence of new skin breakdown  Description: 1.  Monitor for areas of redness and/or skin breakdown  2.  Assess vascular access sites hourly  3.  Every 4-6 hours minimum:  Change oxygen saturation probe site  4.  Every 4-6 hours:  If on nasal continuous positive airway pressure, respiratory therapy assess nares and determine need for appliance change or resting period.  10/20/2024 2259 by Barbie Cantor RN  Outcome: Progressing  10/20/2024 1707 by Hamida Ochoa RN  Outcome: Progressing     Problem: ABCDS Injury Assessment  Goal: Absence of physical injury  10/20/2024 2259 by Barbie Cantor RN  Outcome: Progressing  10/20/2024 1707 by Hamida Ochoa RN  Outcome: Progressing     Problem: Nutrition

## 2024-10-21 NOTE — PROGRESS NOTES
Dialysis Time Out  To be done by RN and tech or 2 RNs  Staff Names Bryan RN  and Ruthann RN    [x]  Identity of the patient using 2 patient identifiers  [x]  Consent for treatment  [x]  Equipment-proper machine and dialyzer  [x]  B-Hep B status 10/19/24  [x]  Orders- to include bath, blood flow, dialyzer, time and fluid removal  [x]  Access-Correct site and in working order  [x]  Time for patient to ask questions.

## 2024-10-21 NOTE — TRANSITION OF CARE
and plan was discussed by me with the admitting medicine resident. The medicine team assigned to the patient by medicine admitting resident will be following up the patient from now onwards on the floor.   Ashlyn Silva MD  Internal Medicine Resident, PGY-2  [place], [place]  10/21/2024,5:02 PM

## 2024-10-21 NOTE — CONSULTS
Palliative Care Inpatient Consult    NAME:  Jony Portillo  MEDICAL RECORD NUMBER:  [id number]  AGE: 63 y.o.   GENDER: male  : 1960  TODAY'S DATE:  10/21/2024    Reasons for Consultation:    Symptom and/or pain management  Provision of information regarding PC and/or hospice philosophies  Complex, time-intensive communication and interdisciplinary psychosocial support  Clarification of goals of care and/or assistance with difficult decision-making  Guidance in regards to resources and transition(s)    Members of PC team contributing to this consultation are: Poncho Urias,     Plan      Palliative Interaction:  Jimmie was seen on palliative care rounds today.  After introductions, I explained my role in his treatment team, as he has not heard of palliative care before.    His wife is not present at bedside.  However, she was here all day.    We reviewed his medical history which includes the diagnosis of lung cancer back in August.  He tells me that he tolerated chemotherapy very well.  He actually got back to the point where he was able to go back to work.  He does continue to smoke cigarettes but states that he stopped 1 week ago.  He states that he was smoking 1 cigarette/day.    He tells me that he recently met with Dr. Small whenever he had the obstruction of his bowels along with the new skin findings.  He tells me that he was very concerned at that time.    Jimmie tells me that he is living every day day by day.  He is hopeful that dialysis will not be a permanent thing.  He is also hoping that chemotherapy may still be an option.    During my discussion with him, oncology came to bedside and was rounding.  They did mention that if things do not improve dramatically over the next several days, that they would probably err on the side of not pursuing further chemotherapy.  It was discussed that chemotherapy may steal uzma time from him.  It was explained to him that in this instance, chemotherapy may  60 minutes    Complexity - 3; managing opioids, consulting for para      Electronically signed by      Poncho Urias DO  Hospice/Palliative Care  [facility], [place], [place]  10/21/2024 4:47 PM  Palliative care office: [phone]

## 2024-10-22 NOTE — PROGRESS NOTES
wheezes, rales or rhonchi, symmetric air entry   Heart - normal rate, regular rhythm, normal S1, S2, no murmurs  Abdomen - soft, nontender, nondistended, no masses or organomegaly   Neurological - alert, oriented, normal speech, no focal findings or movement disorder noted   Musculoskeletal - no joint tenderness, deformity or swelling   Extremities - peripheral pulses normal, no pedal edema, no clubbing or cyanosis   Skin - normal coloration and turgor, no rashes, no suspicious skin lesions noted ,    DATA:    Labs:   CBC:   Recent Labs     10/21/24  0546 10/22/24  0412   WBC 30.7* 30.0*   HGB 9.1* 8.4*   HCT 27.9* 24.5*   PLT 85* 93*     BMP:   Recent Labs     10/21/24  0546 10/21/24  1333 10/22/24  0412   * 124* 124*   K 5.3  --  4.9   CO2 19*  --  22   BUN 75*  --  54*   CREATININE 3.9*  --  3.4*   LABGLOM 17*  --  20*   GLUCOSE 84  --  81     PT/INR:   No results for input(s): \"PROTIME\", \"INR\" in the last 72 hours.      IMAGING DATA:  XR ABDOMEN (KUB) (SINGLE AP VIEW)   Final Result   There are multiple central dilated loops of small bowel with paucity of gas   in the colon, concerning for small bowel obstruction.  Consider   cross-sectional imaging for further evaluation if indicated.         XR CHEST PORTABLE   Final Result   1.  No acute abnormality.         US RENAL COMPLETE   Final Result   Negative for right hydronephrosis. Left kidney is poorly visualized.      Ascites.      Distended gallbladder with internal sludge.         CT CHEST PULMONARY EMBOLISM W CONTRAST   Final Result   1. Small pulmonary embolism in a subsegmental branch of the upper lobar   branch of right pulmonary artery.   2. No additional pulmonary embolism.   3. Mild to moderate COPD.   4. Moderate amount of ascites in upper abdomen.   5. Multiple ill-defined hypodense areas in the visualized upper and   midportion of liver, suggestive of scattered metastatic lesions but not   optimally evaluated. Surface of liver appears to be

## 2024-10-22 NOTE — CARE COORDINATION
SW attempted to meet with pt to discuss outpatient dialysis. Pt not in room, currently in IR. Will meet with pt as soon as possible.

## 2024-10-22 NOTE — BRIEF OP NOTE
Brief Postoperative Note    Jony Portillo  YOB: 1960  [id number]    Pre-operative Diagnosis: Acute Renal Failure      Post-operative Diagnosis: Same    Procedure: Tunneled Dialysis Catheter    Medication Given: none    Anesthesia: 1%Lidocaine     Surgeons/Assistants: ADIA Peters    Estimated Blood Loss: Minimal    Complications: none    14 Fr x 33 cm tip to cuff palindrome tunneled HD Catheter placed successfully via the Site:  Left Femoral Vein.  Catheter secured to skin, dressing applied.  Catheter locked with Heparin.  May use catheter.    Electronically signed by ADIA Peters on 10/22/2024 at 11:38 AM

## 2024-10-22 NOTE — PROGRESS NOTES
NEPHROLOGY PROGRESS NOTE      ASSESSMENT     Acute kidney injury oligoanuric secondary to  ATN from volume depletion (poor p.o./hypotension) and Alimta NSAID// Bactrim use -hemodialysis dependent since 19th.  Presented with BUN of 118 with creatinine of 4.6.  Baseline creatinine normal.  Doubt TLS or microangiopathy  Hyperkalemia with potassium of 7 on admission secondary to acute kidney injury  High anion gap metabolic acidosis secondary to acute kidney injury  Hyponatremia secondary to oligoanuric acute renal failure/ADH mediated appropriate reflected by urine studies from volume depletion.  Underlying SIADH not excluded completely  Acute pulmonary embolism/DVT on anticoagulation  Metastatic upper lobe adenocarcinoma of the lung  Anemia/thrombocytopenia with no evidence of hemolysis    PLAN     S/p 3 sessions of HD so far.  No acute need for HD today, HD tomorrow morning  Discontinue IV fluid  IR to place tunneled HD catheter  Increase midodrine to 10 mg oral 3 times daily  Continue to hold Bactrim/meloxicam and other nephrotoxic medications   following to secure outpatient HD spot  Daily BMP  We will follow with you    SUBJECTIVE     Known case of metastatic adenocarcinoma of lung on chemotherapy with Alimta presented with loss of appetite/effort intolerance and shortness of breath.  Initial assessment showed hypotensive gentleman with investigations demonstrating acute renal injury/hyperkalemia/metabolic acidosis.  He received fluid resuscitation to optimize his blood pressure but renal function continued to worsen prompting initiation of dialysis    Patient was seen and examined at bedside.  Afebrile, hemodynamically stable except for soft BP but maintaining MAP above 65 as of this morning, on room air.  Underwent HD yesterday with 1 L removed, hypotension limited ultrafiltration.  UOP charted as only 62 mL, Louis removed  Going to IR for paracentesis and tunneled HD catheter placement    Labs

## 2024-10-22 NOTE — PROGRESS NOTES
PROVIDE ADEQUATE OXYGENATION WITH ACCEPTABLE SP02/ABG'S    [x]  IDENTIFY APPROPRIATE OXYGEN THERAPY  [x]   MONITOR SP02/ABG'S AS NEEDED   [x]   PATIENT EDUCATION AS NEEDED    BRONCHOSPASM/BRONCHOCONSTRICTION     [x]         IMPROVE AERATION/BREATH SOUNDS  [x]   ADMINISTER BRONCHODILATOR THERAPY AS APPROPRIATE  [x]   ASSESS BREATH SOUNDS  [x]   IMPLEMENT AEROSOL/MDI PROTOCOL  [x]   PATIENT EDUCATION AS NEEDED

## 2024-10-22 NOTE — BRIEF OP NOTE
Brief Postoperative Note for Paracentesis    Jony Portillo  YOB: 1960  [id number]    Pre-operative Diagnosis:  Ascites     Post-operative Diagnosis: Same    Procedure: Ultrasound guided paracentesis     Anesthesia: 1% Lidocaine     Surgeons/Assistants: Mike Jaffe PA-C    Complications: none    EBL: Minimal    Specimens: Were obtained    Ultrasound guided paracentesis performed. 4600 ml shravan fluid obtained.  Dressing applied.     Electronically signed by ADIA Peters on 10/22/2024 at 10:54 AM

## 2024-10-22 NOTE — PLAN OF CARE
Problem: Safety - Adult  Goal: Free from fall injury  Outcome: Progressing     Problem: Discharge Planning  Goal: Discharge to home or other facility with appropriate resources  Outcome: Progressing     Problem: Pain  Goal: Verbalizes/displays adequate comfort level or baseline comfort level  Outcome: Progressing     Problem: Skin/Tissue Integrity  Goal: Absence of new skin breakdown  Description: 1.  Monitor for areas of redness and/or skin breakdown  2.  Assess vascular access sites hourly  3.  Every 4-6 hours minimum:  Change oxygen saturation probe site  4.  Every 4-6 hours:  If on nasal continuous positive airway pressure, respiratory therapy assess nares and determine need for appliance change or resting period.  Outcome: Progressing     Problem: ABCDS Injury Assessment  Goal: Absence of physical injury  Outcome: Progressing     Problem: Nutrition Deficit:  Goal: Optimize nutritional status  Outcome: Progressing     Problem: Respiratory - Adult  Goal: Achieves optimal ventilation and oxygenation  10/22/2024 0554 by Juan Woodward RN  Outcome: Progressing  10/21/2024 2116 by Monserrat Larson RCP  Flowsheets  Taken 10/21/2024 2116 by Monserrat Larson RCP  Achieves optimal ventilation and oxygenation:   Assess for changes in respiratory status   Assess for changes in mentation and behavior   Position to facilitate oxygenation and minimize respiratory effort   Oxygen supplementation based on oxygen saturation or arterial blood gases   Initiate smoking cessation protocol as indicated   Encourage broncho-pulmonary hygiene including cough, deep breathe, incentive spirometry   Assess the need for suctioning and aspirate as needed   Assess and instruct to report shortness of breath or any respiratory difficulty   Respiratory therapy support as indicated  Taken 10/21/2024 0815 by Ruthann Evangelista RN  Achieves optimal ventilation and oxygenation: Assess for changes in respiratory status

## 2024-10-22 NOTE — PLAN OF CARE
Problem: Safety - Adult  Goal: Free from fall injury  10/22/2024 0745 by Emily Jiménez RN  Outcome: Progressing     Problem: Discharge Planning  Goal: Discharge to home or other facility with appropriate resources  10/22/2024 0745 by Emily Jiménez RN  Outcome: Progressing     Problem: Pain  Goal: Verbalizes/displays adequate comfort level or baseline comfort level  10/22/2024 0745 by Emily Jiménez RN  Outcome: Progressing     Problem: Skin/Tissue Integrity  Goal: Absence of new skin breakdown  Description: 1.  Monitor for areas of redness and/or skin breakdown  2.  Assess vascular access sites hourly  3.  Every 4-6 hours minimum:  Change oxygen saturation probe site  4.  Every 4-6 hours:  If on nasal continuous positive airway pressure, respiratory therapy assess nares and determine need for appliance change or resting period.  10/22/2024 0745 by Emily Jiméenz RN  Outcome: Progressing     Problem: ABCDS Injury Assessment  Goal: Absence of physical injury  10/22/2024 0745 by Emily Jiménez RN  Outcome: Progressing     Problem: Nutrition Deficit:  Goal: Optimize nutritional status  10/22/2024 0745 by Emily Jiménez RN  Outcome: Progressing     Problem: Respiratory - Adult  Goal: Achieves optimal ventilation and oxygenation  10/22/2024 0745 by Emily Jiménez RN  Outcome: Progressing

## 2024-10-22 NOTE — CARE COORDINATION
SW met with pt and pt's wife to discuss outpatient dialysis. Per pt, he is wanting to continue all treatments including dialysis at this time. SW and pt discussed [facility] facility as it is closest to their home. Pt in agreement and denies any questions/concerns at this time. SW placed referral with [facility] and will update with a chair time as soon as all labs necessary for referral are resulted and pt is medically cleared. SW to continue to follow.     Hemodialysis Initial Assessment    Information provided by: Pt/wife    New or Current with HD: New    Unit:  [facility]    Schedule:  TTS, mid-morning per pt request    Physician:  [facility] [place]-Dr. Mulligan rounds at [facility]     Transportation:  self/wife     Insurance:  [employer]    DME:  Wheelchair currently     Discharge Plan: TBD, per CM note, DC plan is dependent on pt progress, either to SNF or home with home healthcare. Will continue to update.

## 2024-10-22 NOTE — PLAN OF CARE
Problem: Respiratory - Adult  Goal: Achieves optimal ventilation and oxygenation  Flowsheets  Taken 10/21/2024 2116 by Monserrat Larson RCP  Achieves optimal ventilation and oxygenation:   Assess for changes in respiratory status   Assess for changes in mentation and behavior   Position to facilitate oxygenation and minimize respiratory effort   Oxygen supplementation based on oxygen saturation or arterial blood gases   Initiate smoking cessation protocol as indicated   Encourage broncho-pulmonary hygiene including cough, deep breathe, incentive spirometry   Assess the need for suctioning and aspirate as needed   Assess and instruct to report shortness of breath or any respiratory difficulty   Respiratory therapy support as indicated

## 2024-10-22 NOTE — PROGRESS NOTES
Patient had Paracentesis. 4600 ml of slightly blood tinged ascitic fluid collected. Catheter discontinued with tip intact.  2x2 gauze and tegaderm to catheter site. No bleeding or hematoma noted. BP stable throughout procedure stable. Awaiting dialysis catheter placement.

## 2024-10-22 NOTE — PROGRESS NOTES
Occupational Therapy    [facility]  Occupational Therapy Not Seen Note    DATE: 10/22/2024    NAME: Jony Portillo  MRN: [id number]   : 1960      Patient not seen this date for Occupational Therapy due to:      Surgery/Procedure: Pt to IR at this time for paracentesis      Next Scheduled Treatment: Will check back PM or 10/23/2024 as able     Electronically signed by Edith Tomlinson OT on 10/22/2024 at 10:29 AM

## 2024-10-22 NOTE — PROGRESS NOTES
[facility]    Admission Medication Reconciliation       The patient's list of current home medications has been reviewed.     Source(s) of information: Dispense report, home medications, patient    Based on information provided by the above source(s), I have updated the patient's home med list as described below.     Please review the ACTION REQUESTED section of this note below for any discrepancies on current hospital orders.      I changed or updated the following medications on the patient's home medication list:  Removed Cephalexin 500mg  Sulfamethoxazole-trimethoprim 800-160 mg     Added None     Adjusted   None   Other Notes Flagged for provider review (pt no longer taking)  Dexamethasone 4mg         PROVIDER ACTION REQUESTED  Medications that need to be addressed by a physician/nurse practitioner:    Medication Action Requested     Dexamethasone 4mg     Determine if medication should be continued         Please feel free to call me with any questions about this encounter. Thank you.        Electronically signed by JAVI LEHMAN on 10/22/2024 at 7:09 PM

## 2024-10-22 NOTE — PROGRESS NOTES
Spiritual Health History and Assessment/Progress Note  [facility]    (P) Spiritual/Emotional Needs,  ,  ,      Name: Jony Portillo MRN: [id number]    Age: 63 y.o.     Sex: male   Language: English   Jainism: Non-Catholic   NAZIA (acute kidney injury) (HCC)     Date: 10/22/2024            Total Time Calculated: (P) 15 min              Spiritual Assessment began in [facility] 4B STEPDOWN        Referral/Consult From: (P) Nurse   Encounter Overview/Reason: (P) Spiritual/Emotional Needs  Service Provided For: (P) Patient and family together     responded to a spiritual service consult for emotional support.  Patient and spouse present and both appear to be coping.  Patient states that he takes things \"one minute at a time\" to help deal with his cancer and medical condition.  States that things have been pretty manageable up until about a month ago.      Wife states that she is doing ok and appears to be slightly passive.  Determined support to be available.      Arlen, Belief, Meaning:   Patient Other: Patient attempts to not think so much about the future and take things one day at a time.    Family/Friends Other: Appears to be coping but concerned.  Passive in conversation.        Importance and Influence:  Patient Other: His wife appears to be strong support system in the patient's life.    Family/Friends Other: Wife appears attentive to patient.     Community:  Patient Other: Wife for support.   Family/Friends Other: Wife bedside.     Assessment and Plan of Care:     Patient Interventions include: Facilitated expression of thoughts and feelings, Explored spiritual coping/struggle/distress, and Affirmed coping skills/support systems  Family/Friends Interventions include: Facilitated expression of thoughts and feelings    Patient Plan of Care: Spiritual Care available upon further referral  Family/Friends Plan of Care: Spiritual Care available upon further referral    Electronically signed by

## 2024-10-23 NOTE — PROGRESS NOTES
.PALLIATIVE CARE NURSING ASSESSMENT    Patient: Jony Portillo  Room: 3023/3023-01    Reason For Consult   Goals of care evaluation  Distress management  Guidance and support  Facilitate communications  Assistance in coordinating care    Code Status: Full Code    PLAN:  -Pt has stroke alert called last night. He was moved to ICU. On 2 pressors. He appears critically ill.   -Pt put on precedex gtt and medicated for pain. He is not responding to my verbal or tactile stimulation.  -Attempted to talk with wife at bedside. She was closed off to much of my conversation around goals of care. She states a family member who is a nurse will explain it all to her.   -Attempted to discuss code status with wife without success.  -Will continue to follow for support and continued goals of care discussions.      Impression: Jony Portillo is a 63 y.o. year old male  has a past medical history of Cancer (HCC), History of DVT (deep vein thrombosis), Hx of blood clots, Snores, and Thyroid disease..  Currently hospitalized for the management of NAZIA.  The Palliative Care Team is following to assist with goals of care/support.     Vital Signs  Blood pressure 119/80, pulse 72, temperature 97.6 °F (36.4 °C), resp. rate 23, height 1.88 m (6' 2\"), weight 76.8 kg (169 lb 5 oz), SpO2 100%.    Patient Active Problem List   Diagnosis    Acute deep vein thrombosis (DVT) of 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 (HCC)    Unintentional weight loss of more than 10 pounds in 90 days    Elevated blood sugar    Left ankle swelling    Other fatigue    Cellulitis of left foot    Malignant neoplasm of upper lobe of right lung (HCC)    Metastasis to cervical lymph node (HCC)    Malignant neoplasm of lung (HCC)    Small bowel obstruction (HCC)    Small bowel obstruction, partial (HCC)    NAZIA (acute kidney injury) (HCC)    Leukocytosis    Normocytic anemia    Hyponatremia    Hyperkalemia    Metabolic acidosis    Pulmonary embolism (HCC)    Elevated lactic

## 2024-10-23 NOTE — PROGRESS NOTES
Dialysis Time Out  To be done by RN and tech or 2 RNs  Staff Names Kye VELÁZQUEZ RN and Mamadou AUGUSTE RN    [x]  Identity of the patient using 2 patient identifiers  [x]  Consent for treatment  [x]  Equipment-proper machine and dialyzer  [x]  B-Hep B status (Antigen Negative 10/19/2024)  [x]  Orders- to include bath, blood flow, dialyzer, time and fluid removal  [x]  Access-Correct site and in working order  [x]  Time for patient to ask questions.

## 2024-10-23 NOTE — CONSULTS
Stroke Neurology Consult Note  Stroke Alert @342  Arrival at bedside @345    Reason for Consult:  Inpatient Stroke alert  Requesting Physician:  ED team   Endovascular Neurosurgeon: Milo Núñez MD  Stroke Team: Tom Shultz MD  History Obtained From:  patient, electronic medical record  Chief Complaint:  Acute neurological deficits   Allergies:  Patient has no known allergies.    HISTORY OF PRESENT ILLNESS:       The patient is a 63 y.o. male with history of metastatic lung cancer, presents with pulmonary embolism, metabolic acidosis and acute renal failure requiring dialysis.  Patient stroke alert was called due to acute onset right-sided facial weakness.  Last known well 1 hour prior to stroke alert    Reports that patient was doing at his neurological baseline, complaining of dizziness and shortness of breath.  Patient then acutely developed right-sided facial droop, inability to close the eye and what seemed to be subjective right-sided weakness.  Therefore stroke alert was called.  Blood pressure on arrival to bedside was in the 80s systolic, patient placed on BiPAP.  Levophed started.    NIH scale of 9 initially as detailed below.  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  3.6 x 2.3 soft tissue density mass at the right submandibular gland new compared to CT 2/14/2024     patient examination then improved with systolic blood pressure reaching 120s, resolution of right sided upper and lower extremity weakness.  Significant improvement in right-sided facial droop.    Patient already on heparin drip for PE, not a candidate for thrombolytics  Patient transferred to MICU for ongoing respiratory distress    LKW: 2:41 AM  NIHSS: 9, improved to 2  Modified Taylor Scale: 3  SBP: 85  Glucose: 84  CT head without contrast: No acute intracranial  Last Year: Never true     Ran Out of Food in the Last Year: Never true   Transportation Needs: No Transportation Needs (10/19/2024)    PRAPARE - Transportation     Lack of Transportation (Medical): No     Lack of Transportation (Non-Medical): No   Physical Activity: Not on file   Stress: Not on file   Social Connections: Not on file   Intimate Partner Violence: Unknown (2/23/2024)    [facility]    [place] Safety & Environment     Fear of Current or Ex-Partner: Not on file     Emotionally Abused: Not on file     Physically Abused: Not on file     Sexually Abused: Not on file     Physically or Sexually Abused: Not on file   Housing Stability: Low Risk  (10/19/2024)    Housing Stability Vital Sign     Unable to Pay for Housing in the Last Year: No     Number of Times Moved in the Last Year: 1     Homeless in the Last Year: No         Problem Relation Age of Onset    Leukemia Mother 86        AML    Heart Disease Father         passed away at 65    Other Father         carotid artery disease    Heart Disease Brother     Heart Attack Brother 45     Prior to Admission medications    Medication Sig Start Date End Date Taking? Authorizing Provider   omeprazole (PRILOSEC OTC) 20 MG tablet Take 1 tablet by mouth daily 10/14/24   Mamie Guzmán MD   ondansetron (ZOFRAN) 8 MG tablet Take 1 tablet by mouth every 8 hours as needed for Nausea or Vomiting Can take it sublingually if needed 10/14/24   Mamie Guzmán MD   dexAMETHasone (DECADRON) 4 MG tablet Take 5 tabs by mouth (with food) the evening before each chemo 10/14/24   Mamie Guzmán MD   meloxicam (MOBIC) 15 MG tablet Take 1 tablet by mouth daily 10/14/24   Mamie Guzmán MD   buPROPion (WELLBUTRIN XL) 150 MG extended release tablet Take 1 tablet by mouth every morning 8/19/24   Mamie Guzmán MD   betamethasone dipropionate 0.05 % cream Apply topically 2 times daily.  Patient taking

## 2024-10-23 NOTE — PROGRESS NOTES
Pharmacy Note     Renal Dose Adjustment    Jony Portillo is a 63 y.o. male. Pharmacist assessment of renally cleared medications.    Recent Labs     10/22/24  0412 10/23/24  0334   BUN 54* 69*       Recent Labs     10/22/24  0412 10/23/24  0334   CREATININE 3.4* 4.2*     Estimated Creatinine Clearance: 20 mL/min (A) (based on SCr of 4.2 mg/dL (H)).    Height:   Ht Readings from Last 1 Encounters:   10/19/24 1.88 m (6' 2\")     Weight:  Wt Readings from Last 1 Encounters:   10/23/24 76.8 kg (169 lb 5 oz)       The following medication dose has been adjusted based upon renal function per P&T Guidelines:             Meropenem 500mg IV Q12H to 1000mg Q24H with dialysis    Pelon LopezD BCPS [facility]  10/23/2024 9:51 AM

## 2024-10-23 NOTE — PROGRESS NOTES
[facility]   Pharmacy Pharmacokinetic Monitoring Service - Vancomycin    Jony Portillo is a 63 y.o. male starting on vancomycin therapy for sepsis. Pharmacy consulted by Dr Hanna for monitoring and adjustment.    Target Concentration: Pre-Dialysis Concentration 15-20 mg/L  Additional Antimicrobials: meropenem    Pertinent Laboratory Values:   Wt Readings from Last 1 Encounters:   10/23/24 76.8 kg (169 lb 5 oz)     Temp Readings from Last 1 Encounters:   10/23/24 98.1 °F (36.7 °C) (Axillary)     Recent Labs     10/22/24  0412 10/23/24  0334   CREATININE 3.4* 4.2*   BUN 54* 69*   WBC 30.0* 34.8*     Procalcitonin: 9.15 ng/mL    Pertinent Cultures:  Culture Date Source Results   10.23 Blood x2 Pending   MRSA Nasal Swab: negative    Plan:  Concentration-guided dosing due to renal impairment and intermittent hemodialysis   Start vancomycin 1500mg IV x1 today  Vancomycin concentration ordered with AM labs on 10.24  Pharmacy will continue to monitor patient and adjust therapy as indicated    Thank you for the consult,  Mildred Trejo PharmD BCPS [facility]  10/23/2024 10:02 AM

## 2024-10-23 NOTE — PROGRESS NOTES
2105: secure messaged on call resident of patients BP. See new orders    2318: secure messaged on call resident of patients BP. See new orders    0235: secure messaged on call resident of patients BP as well as SOB. Awaiting response     0259: resident to bedside due to symptoms. See new orders. Per resident, hold PRN pain meds as patient has low BP and for now we can \"let it ride\" with the BP. No other new orders.     0305: BP 79/50 (61) progressively more symptomatic with lightheadedness and dizziness. Patient just received a breathing treatment that did not help with symptoms and current pain is 10/10 however unable to give pain meds due to BP. Resident notified. See new orders. Resident also states \"change the BP cuff and the site\"     0313: writer secure messaged on call resident to see if we could try BIPAP machine to help with work of breathing. \"Will order\" per resident. See orders for CXR    0319: writer secure messaged on call resident that patient states his feet are numb and swelling has increased. Asking to come back to bedside and to consult critical care due to patients symptoms with no improvement with current interventions.    0330: IM resident and critical care resident at bedside    0338: patient states unable to close right eye. Stroke alert called    0347: Arvin Soliz RN at bedside. Vitals as charted. Patient symptomatic (dizzy/lightheaded), A/O x4    0350: levo started at 10 mcg/min, vitals as charted. Head CT ordered    0353: BP 86/levo increased to 15mcg/min    0359: pt able to tolerate CT    0404: to CT then to MICU; report given at bedside by writer    0517: writer called patients wife with update on situation and new room number. All questions answered.

## 2024-10-23 NOTE — PLAN OF CARE
Problem: Safety - Adult  Goal: Free from fall injury  Outcome: Progressing     Problem: Discharge Planning  Goal: Discharge to home or other facility with appropriate resources  Outcome: Progressing     Problem: Pain  Goal: Verbalizes/displays adequate comfort level or baseline comfort level  Outcome: Progressing     Problem: Skin/Tissue Integrity  Goal: Absence of new skin breakdown  Description: 1.  Monitor for areas of redness and/or skin breakdown  2.  Assess vascular access sites hourly  3.  Every 4-6 hours minimum:  Change oxygen saturation probe site  4.  Every 4-6 hours:  If on nasal continuous positive airway pressure, respiratory therapy assess nares and determine need for appliance change or resting period.  Outcome: Progressing     Problem: ABCDS Injury Assessment  Goal: Absence of physical injury  Outcome: Progressing     Problem: Nutrition Deficit:  Goal: Optimize nutritional status  Outcome: Progressing     Problem: Respiratory - Adult  Goal: Achieves optimal ventilation and oxygenation  Outcome: Progressing

## 2024-10-23 NOTE — PROGRESS NOTES
dialyzed 10/21/2024, ran for 3.5 hours with 1 L fluid removal via left FEM temp cath.    Urine output documented at 55 cc over 24 hours +1 unmeasured occurrence.    Brief History reviewed.  63 y.o. male who presented to the hospital for evaluation of worsening fatigue, shortness of breath after a syncopal event without loss of consciousness.  Currently undergoing chemotherapy for metastatic primary lung cancer. Per patient he had been feeling poorly for approximately 5 days prior to admission.  He was going to the bathroom which he encountered acute fatigue/near syncopal event and he lowered himself to the floor.  EMS was called and patient was admitted through the ER.  He was found to be hypothermic, hypotensive with severe metabolic derangement on admission.      Patient recently was treated with Bactrim in the outpatient setting after skin lesion biopsy was completed by  on 10/15/24     He does not follow with a nephrologist.  Renal function is normal at baseline with a creatinine between 0.7 and 0.9.    Nephrology consulted for NAZIA, right femoral temp cath placed and dialysis was initiated 10/19.     10/20: second dialysis treatment.   10/21: third dialysis treatment.  Outpatient Medications:     Medications Prior to Admission: [DISCONTINUED] cephALEXin (KEFLEX) 500 MG capsule, 500 mgTake three times daily  [DISCONTINUED] sulfamethoxazole-trimethoprim (BACTRIM DS) 800-160 MG per tablet, Take 1 tablet by mouth 2 times daily for 10 days  omeprazole (PRILOSEC OTC) 20 MG tablet, Take 1 tablet by mouth daily  ondansetron (ZOFRAN) 8 MG tablet, Take 1 tablet by mouth every 8 hours as needed for Nausea or Vomiting Can take it sublingually if needed  dexAMETHasone (DECADRON) 4 MG tablet, Take 5 tabs by mouth (with food) the evening before each chemo  meloxicam (MOBIC) 15 MG tablet, Take 1 tablet by mouth daily  buPROPion (WELLBUTRIN XL) 150 MG extended release tablet, Take 1 tablet by mouth every  10/19/2024 10:59 AM    GAMGLOB 0.9 10/19/2024 10:59 AM    GGPCT 18 10/19/2024 10:59 AM    PATH ELECTRONICALLY SIGNED. SHI QUISPE MD 10/19/2024 03:29 PM     UPEP:     Lab Results   Component Value Date/Time    LABPE  10/19/2024 03:29 PM     Urine protein is elevated.  Protein electrophoresis demonstrates increased glomerular permeability. A decrease in tubular function cannot be ruled out.       Hep BsAg:         Lab Results   Component Value Date/Time    HEPBSAG NONREACTIVE 10/19/2024 10:59 AM     Hep C AB:          Lab Results   Component Value Date/Time    HEPCAB NONREACTIVE 10/19/2024 10:59 AM       Urinalysis/Chemistries:      Lab Results   Component Value Date/Time    NITRU NEGATIVE 10/19/2024 03:26 AM    COLORU Dark Yellow 10/19/2024 03:26 AM    PHUR 5.0 10/19/2024 03:26 AM    WBCUA 0 TO 2 10/19/2024 03:26 AM    RBCUA 5 TO 10 10/19/2024 03:26 AM    LEUKOCYTESUR NEGATIVE 10/19/2024 03:26 AM    UROBILINOGEN Normal 10/19/2024 03:26 AM    BILIRUBINUR NEGATIVE 10/19/2024 03:26 AM    GLUCOSEU NEGATIVE 10/19/2024 03:26 AM    KETUA NEGATIVE 10/19/2024 03:26 AM       Urine Osmolarity:   Lab Results   Component Value Date/Time    OSMOU 324 10/21/2024 10:21 AM         Radiology:     Reviewed.     Assessment:       1. Acute kidney injury oligoanuric secondary to  ATN from volume depletion (poor p.o./hypotension) and Alimta NSAID// Bactrim use -hemodialysis dependent since 19th.  Presented with BUN of 118 with creatinine of 4.6.  Baseline creatinine normal.   2. Hyperkalemia with potassium of 7 on admission secondary to acute kidney injury  3. High anion gap metabolic acidosis secondary to acute kidney injury  4. Hyponatremia secondary to oligoanuric acute renal failure/ADH mediated appropriate reflected by urine studies from volume depletion.  Underlying SIADH not excluded completely  5. Acute pulmonary embolism/DVT on anticoagulation  6. Metastatic upper lobe adenocarcinoma of the lung  7. Anemia/thrombocytopenia

## 2024-10-23 NOTE — PROGRESS NOTES
Dialysis Post Treatment Note  Vitals:    10/23/24 1400   BP: 134/68   Pulse: 87   Resp: 17   Temp: 97.6 °F (36.4 °C)   SpO2: 99%     Pre-Weight = 76.8kg  Post-weight = Weight - Scale: 76.8 kg (169 lb 5 oz)  Total Liters Processed = Blood Volume Processed (Liters): 70.07 L  Rinseback Volume (mL) = Rinseback Volume (ml): 300 ml  Net Removal (mL) =  0  Patient's dry weight=  Type of access used= Left Femoral TDC  Length of treatment=3.5 hours     Pt tolerated HD tx fair, bp low throughout tx, pt on Levo, titrated by primary RN, pt given 12.5g Albumin x's 3 with HD, no fluid removed, femoral TDC worked well, post tx report given to Primary RN Mamadou DUVALL

## 2024-10-23 NOTE — PROGRESS NOTES
STROKE ALERT paged out ct held room right away scanned a ct head without contrast and after neuro reviewed that ct was verbally instructed by Shashi Lunsford DR to do a cta head and neck  labs were bypassed because of stroke alert 439 am AB/BF

## 2024-10-23 NOTE — PROGRESS NOTES
Pt arrived to room with multiple RNs, RT, and residents. Pt was on monitor, bipap and had levo running at 35 mcg/min. Bedside report was received from previous nurses who stated they will update wife as well with changes and admission back to MICU.     Dolly Chaney RN

## 2024-10-23 NOTE — H&P
°C), Min:97.6 °F (36.4 °C), Max:98.5 °F (36.9 °C)      Patient Vitals for the past 8 hrs:   BP Temp Temp src Pulse Resp SpO2   10/23/24 0258 -- -- -- -- 26 --   10/23/24 0230 (!) 85/54 -- -- -- -- --   10/23/24 0224 93/61 -- -- (!) 101 27 100 %   10/23/24 0100 (!) 91/57 -- -- 96 15 100 %   10/23/24 0030 (!) 83/55 -- -- 100 21 99 %   10/23/24 0015 (!) 87/56 -- -- 98 25 98 %   10/23/24 0000 (!) 87/59 -- -- 96 17 99 %   10/22/24 2315 (!) 80/59 97.7 °F (36.5 °C) Oral 96 28 99 %   10/22/24 2205 (!) 84/54 -- -- 96 12 100 %   10/22/24 2145 (!) 81/58 -- -- -- -- --   10/22/24 2102 (!) 75/56 97.7 °F (36.5 °C) Oral -- -- --         Intake/Output Summary (Last 24 hours) at 10/23/2024 0456  Last data filed at 10/22/2024 1805  Gross per 24 hour   Intake 2219.46 ml   Output 55 ml   Net 2164.46 ml       Wt Readings from Last 3 Encounters:   10/21/24 78 kg (171 lb 15.3 oz)   10/15/24 74.8 kg (165 lb)   10/14/24 74.8 kg (165 lb)     Body mass index is 22.08 kg/m².        PHYSICAL EXAM:  Constitutional: Appears in mild distress SOB  EENT: Right side of the neck with multiple scattered and lesions, neck supple with midline trachea.  Neck: Supple, symmetrical, trachea midline, no adenopathy,  no jvd, skin normal  Respiratory: clear to auscultation, no wheezes or rales and unlabored breathing. No intercostal tenderness  Cardiovascular: regular rate and rhythm, normal S1, S2, no murmur noted  Abdomen: soft, nontender, nondistended, no masses or organomegaly  Extremities: Weakness in right upper and lower extremity, inability to close his right eye and inability to raise right eyebrow.    MEDICATIONS:  Scheduled Meds:   midodrine  10 mg Oral TID    lidocaine  1 patch TransDERmal Daily    ipratropium 0.5 mg-albuterol 2.5 mg  1 Dose Inhalation BID    cefepime  1,000 mg IntraVENous Q24H    docusate sodium  100 mg Oral Daily    sodium chloride flush  5-40 mL IntraVENous 2 times per day    levothyroxine  75 mcg Oral Daily    famotidine  20 mg  Oral Daily     Continuous Infusions:   norepinephrine 25 mcg/min (10/23/24 0402)    dextrose      heparin (PORCINE) Infusion Stopped (10/23/24 0350)    sodium chloride       PRN Meds:   ceFAZolin (ANCEF) 1,000 mg in sterile water 10 mL IV syringe, , PRN  oxyCODONE, 5 mg, Q4H PRN  midodrine, 10 mg, PRN  melatonin, 10 mg, Nightly PRN  albuterol, 2.5 mg, Q4H PRN  glucose, 4 tablet, PRN  dextrose bolus, 125 mL, PRN   Or  dextrose bolus, 250 mL, PRN  glucagon (rDNA), 1 mg, PRN  dextrose, , Continuous PRN  heparin (porcine), 80 Units/kg, PRN  heparin (porcine), 40 Units/kg, PRN  sodium chloride flush, 5-40 mL, PRN  sodium chloride, , PRN  ondansetron, 4 mg, Q8H PRN   Or  ondansetron, 4 mg, Q6H PRN  polyethylene glycol, 17 g, Daily PRN  acetaminophen, 650 mg, Q6H PRN   Or  acetaminophen, 650 mg, Q6H PRN          ABGs:   Lab Results   Component Value Date/Time    FIO2 INFORMATION NOT PROVIDED 10/23/2024 03:34 AM       DATA:  Complete Blood Count:   Recent Labs     10/21/24  0546 10/22/24  0412 10/23/24  0334   WBC 30.7* 30.0* 34.8*   RBC 3.05* 2.82* 3.18*   HGB 9.1* 8.4* 9.4*   HCT 27.9* 24.5* 28.5*   MCV 91.5 86.9 89.6   MCH 29.8 29.8 29.6   MCHC 32.6 34.3 33.0   RDW 18.3* 17.6* 18.8*   PLT 85* 93* 95*   MPV 11.9 12.7 12.4        Last 3 Blood Glucose:   Recent Labs     10/20/24  1000 10/20/24  1848 10/21/24  0546 10/22/24  0412 10/23/24  0334   GLUCOSE 121* 114* 84 81 84        PT/INR:    Lab Results   Component Value Date/Time    PROTIME 22.3 10/19/2024 01:56 AM    INR 2.0 10/19/2024 01:56 AM     PTT:    Lab Results   Component Value Date/Time    APTT 102.6 10/23/2024 03:34 AM       Comprehensive Metabolic Profile:   Recent Labs     10/21/24  0546 10/21/24  1333 10/22/24  0412 10/23/24  0334   * 124* 124* 121*   K 5.3  --  4.9 5.9*   CL 87*  --  87* 86*   CO2 19*  --  22 17*   BUN 75*  --  54* 69*   CREATININE 3.9*  --  3.4* 4.2*   GLUCOSE 84  --  81 84   CALCIUM 7.7*  --  8.1* 7.4*      Magnesium: No results found

## 2024-10-23 NOTE — CONSULTS
[facility]   IN-PATIENT SERVICE   [facility]    Neurology Consult Note            Date:   10/23/2024  Patient name:  Jony Portillo  Date of admission:  10/19/2024  1:44 AM  MRN:   [id number]  Account:  [id number]  YOB: 1960  PCP:    Keisha Hackett MD  Room:   [address] [place] [address]  Code Status:    Full Code    Chief Complaint:     Chief Complaint   Patient presents with    Fatigue       History Obtained From:     patient, electronic medical record    History of Present Illness:     The patient is a 63 y.o. male with history of metastatic lung cancer, presents with pulmonary embolism, metabolic acidosis and acute renal failure requiring dialysis.  Patient stroke alert was called due to acute onset right-sided facial weakness.  Last known well 1 hour prior to stroke alert     Reports that patient was doing at his neurological baseline, complaining of dizziness and shortness of breath.  Patient then acutely developed right-sided facial droop, inability to close the eye and what seemed to be subjective right-sided weakness.  Therefore stroke alert was called.  Blood pressure on arrival to bedside was in the 80s systolic, patient placed on BiPAP.  Levophed started.     NIH scale of 9 initially as detailed below.  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  3.6 x 2.3 soft tissue density mass at the right submandibular gland new compared to CT 2/14/2024      patient examination then improved with systolic blood pressure reaching 120s, resolution of right sided upper and lower extremity weakness.  Significant improvement in right-sided facial droop.     Patient already on heparin drip for PE, not a candidate for thrombolytics  Patient transferred to MICU for ongoing respiratory distress    LKW: 2:41 AM  NIHSS: 9,  injury) (HCC) [N17.9] 10/19/2024    Leukocytosis [D72.829] 10/19/2024    Normocytic anemia [D64.9] 10/19/2024    Hyponatremia [E87.1] 10/19/2024    Hyperkalemia [E87.5] 10/19/2024    Metabolic acidosis [E87.20] 10/19/2024    Pulmonary embolism (HCC) [I26.99] 10/19/2024    Elevated lactic acid level [R79.89] 10/19/2024    Malignant neoplasm of upper lobe of right lung (HCC) [C34.11] 09/11/2023    Other fatigue [R53.83] 07/24/2023       jarvis Portillo is a 63 y.o.  male with medical history as noted above who presents with acute renal failure, PE.  Stroke alert was called due to acute onset right-sided deficits as indicated above.  Patient is on IV heparin drip for PE, not a candidate for thrombolytics.  Symptoms significantly improved     TIA vs Post circulation stroke to be ruled out  Lower motor neuron CN VII palsy    Plan:       Patient on Heparin drip for PE  Continue management for medical condition per primary team  MRI brain WO contrast when possible  Neurochecks per protocol  Try to maintain blood pressure 120-160.   No treatment of BP unless >180 systolic until MRI is back.     Consultations:   IP CONSULT TO VASCULAR SURGERY  IP CONSULT TO NEPHROLOGY  IP CONSULT TO CRITICAL CARE  IP CONSULT TO HEM/ONC  IP CONSULT TO PALLIATIVE CARE  IP CONSULT TO SPIRITUAL SERVICES  IP CONSULT TO CRITICAL CARE  IP CONSULT TO NEUROLOGY      Follow-up further recommendations after discussing the case with attending  The plan was discussed with the patient, patient's family and the medical staff.     Patient is admitted as inpatient status because of co-morbidities listed above, severity of signs and symptoms as outlined, requirement for current medical therapies and most importantly because of direct risk to patient if care not provided in a hospital setting.    RICKY Alvarez MD      Neurology Resident PGY-3  10/23/2024  5:31 AM    Copy sent to Keisha Hale MD

## 2024-10-24 PROBLEM — A41.9 SEPTIC SHOCK (HCC): Status: ACTIVE | Noted: 2024-01-01

## 2024-10-24 PROBLEM — K72.00 SHOCK LIVER: Status: ACTIVE | Noted: 2024-01-01

## 2024-10-24 PROBLEM — R65.21 SEPTIC SHOCK (HCC): Status: ACTIVE | Noted: 2024-01-01

## 2024-10-24 NOTE — PLAN OF CARE
Problem: Safety - Adult  Goal: Free from fall injury  10/24/2024 0222 by Sandra Alberto, RN  Outcome: Progressing     Problem: Discharge Planning  Goal: Discharge to home or other facility with appropriate resources  10/24/2024 0222 by Sandra Alberto, RN  Outcome: Progressing     Problem: Pain  Goal: Verbalizes/displays adequate comfort level or baseline comfort level  10/24/2024 0222 by Sandra Alberto, RN  Outcome: Progressing     Problem: Skin/Tissue Integrity  Goal: Absence of new skin breakdown  Description: 1.  Monitor for areas of redness and/or skin breakdown  2.  Assess vascular access sites hourly  3.  Every 4-6 hours minimum:  Change oxygen saturation probe site  4.  Every 4-6 hours:  If on nasal continuous positive airway pressure, respiratory therapy assess nares and determine need for appliance change or resting period.  10/24/2024 0222 by Sandra Alberto, RN  Outcome: Progressing     Problem: ABCDS Injury Assessment  Goal: Absence of physical injury  10/24/2024 0222 by Sandra Alberto, RN  Outcome: Progressing     Problem: Nutrition Deficit:  Goal: Optimize nutritional status  10/24/2024 0222 by Sandra Alberto, RN  Outcome: Progressing     Problem: Respiratory - Adult  Goal: Achieves optimal ventilation and oxygenation  10/24/2024 0222 by Sandra Alberto, RN  Outcome: Progressing

## 2024-10-24 NOTE — PLAN OF CARE
Problem: Safety - Adult  Goal: Free from fall injury  10/24/2024 1452 by Venessa Bhatia RN  Outcome: Progressing     Problem: Discharge Planning  Goal: Discharge to home or other facility with appropriate resources  10/24/2024 1452 by Venessa Bhatia RN  Outcome: Progressing     Problem: Pain  Goal: Verbalizes/displays adequate comfort level or baseline comfort level  10/24/2024 1452 by Venessa Bhatia RN  Outcome: Progressing     Problem: Skin/Tissue Integrity  Goal: Absence of new skin breakdown  Description: 1.  Monitor for areas of redness and/or skin breakdown  2.  Assess vascular access sites hourly  3.  Every 4-6 hours minimum:  Change oxygen saturation probe site  4.  Every 4-6 hours:  If on nasal continuous positive airway pressure, respiratory therapy assess nares and determine need for appliance change or resting period.  10/24/2024 1452 by Venessa Bhatia RN  Outcome: Progressing

## 2024-10-24 NOTE — CARE COORDINATION
SW met with pt to complete PHQ9. Per pt nurse, pt easily agitated/\"impulsive\". Pt unable to make eye contact and restless when SW in room. SW follow to complete PHQ9 when pt able to participate.

## 2024-10-24 NOTE — PROGRESS NOTES
PROVIDE ADEQUATE OXYGENATION WITH ACCEPTABLE SP02/ABG'S    [x]  IDENTIFY APPROPRIATE OXYGEN THERAPY  [x]   MONITOR SP02/ABG'S AS NEEDED   [x]   PATIENT EDUCATION AS NEEDED    BRONCHOSPASM/BRONCHOCONSTRICTION     [x]         IMPROVE AERATION/BREATH SOUNDS  [x]   ADMINISTER BRONCHODILATOR THERAPY AS APPROPRIATE  [x]   ASSESS BREATH SOUNDS  [x]   IMPLEMENT AEROSOL/MDI PROTOCOL  [x]   PATIENT EDUCATION AS NEEDED    NON INVASIVE VENTILATION  [x]  PROVIDE OPTIMAL VENTILATION/ACCEPTABLE SP02  [x]  IMPLEMENT NON INVASIVE VENTILATION PROTOCOL  [x]  ASSESSMENT SKIN INTEGRITY  [x]  PATIENT EDUCATION AS NEEDED  [x]  BIPAP AS NEEDED

## 2024-10-24 NOTE — PLAN OF CARE
Problem: Respiratory - Adult  Goal: Achieves optimal ventilation and oxygenation  10/23/2024 2040 by Rukhsana Davison RCP  Outcome: Progressing

## 2024-10-24 NOTE — PROGRESS NOTES
Renal Progress Note    Patient :  Jony Portillo; 63 y.o. MRN# [id number]  Location:  3023/3023-01  Attending:  Alejandro Yang MD  Admit Date:  10/19/2024   Hospital Day: 5    Subjective:     Patient seen and examined at bedside, has had significant decline from day prior.  He is now on pressure support x 3, BiPAP dependent, CRRT being primed.  Patient is not responsive to verbal stimuli, however he was just given a dose of fentanyl and is on a Precedex drip.  Potassium has been climbing, patient was given one-time dose of insulin and dextrose, unable to give Lokelma due to altered mental status.  He is now on 3 pressors including Levophed, Neto-Synephrine and vasopressin.  Had hemodialysis yesterday no ultrafiltration done.    Repeat CMP this morning showed a sodium of 126 potassium 6 chloride 87 bicarb 31 BUN 50 creatinine 2.5 lactic acid 1 calcium 7.1 ABG showed pH of 7.33 pCO2 33 pO2 of 99 HCO3 17.9.  Lactate is increased from 4.3 at approximately 3:30 this morning to 12.0 at 10:25.  Hemoglobin is 6.9.    Labs reviewed.   Latest Reference Range & Units 10/21/24 05:46 10/21/24 11:08 10/21/24 13:33 10/22/24 04:12 10/23/24 03:34 10/24/24 10:25   Sodium 136 - 145 mmol/L 123 (L)  124 (L) 124 (L) 121 (L) 126 (L)   Potassium 3.7 - 5.3 mmol/L 5.3   4.9 5.9 (H) 6.0 (HH)   Chloride 98 - 107 mmol/L 87 (L)   87 (L) 86 (L) 87 (L)   CARBON DIOXIDE 20 - 31 mmol/L 19 (L)   22 17 (L) 13 (L)   BUN,BUNPL 8 - 23 mg/dL 75 (H)   54 (H) 69 (H) 50 (H)   Creatinine 0.70 - 1.20 mg/dL 3.9 (H)   3.4 (H) 4.2 (H) 3.5 (H)   Est, Glom Filt Rate >60 mL/min/1.73m2 17 (L)   20 (L) 15 (L) 19 (L)   Lactic Acid, Whole Blood 0.7 - 2.1 mmol/L 3.9 (H)    4.3 (H) 12.0 (H)   Glucose 74 - 99 mg/dL 84   81 84 52 (L)   Calcium 8.6 - 10.4 mg/dL 7.7 (L)   8.1 (L) 7.4 (L) 7.1 (L)   Phosphorus 2.5 - 4.5 mg/dL 6.0 (H)        Serum Osmolality 275 - 295 mOsm/kg  268 (L)       WBC k/uL 30.7 (HH)   30.0 (H) 34.8 (HH) PENDING   RBC 4.21 - 5.77 m/uL 3.05 (L)   2.82  sodium chloride, ondansetron **OR** ondansetron, polyethylene glycol, acetaminophen **OR** acetaminophen    Input/Output:       I/O last 3 completed shifts:  In: 2964.3 [I.V.:1217.5; IV Piggyback:1446.8]  Out: 300 .    Patient Vitals for the past 96 hrs (Last 3 readings):   Weight   10/24/24 0554 77.3 kg (170 lb 6.7 oz)   10/23/24 1400 76.8 kg (169 lb 5 oz)   10/23/24 1015 76.8 kg (169 lb 5 oz)       Vital Signs:   Temperature:  Temp: 97.5 °F (36.4 °C)  TMax:   Temp (24hrs), Av.7 °F (36.5 °C), Min:97.1 °F (36.2 °C), Max:98.8 °F (37.1 °C)    Respirations:  Respirations: 15  Pulse:   Pulse: 93  BP:    BP: (!) 117/50  BP Range: Systolic (24hrs), Av , Min:41 , Max:139       Diastolic (24hrs), Av, Min:29, Max:106      Physical Examination:     General:  Obtunded, accessory muscle use noted.  HEENT: Atraumatic, normocephalic, no throat congestion, mucous membranes dry   neck:   Supple  Chest:   Bilateral breath sounds diminished with crackles throughout, BiPAP dependent   cardiac:  S1 S2 RR, no murmurs, gallops or rubs.  Abdomen: Soft, non-tender, no masses or organomegaly, BS audible.  :   Anuric.   Neuro:  Obtunded, on CPAP, tachypnea noted  SKIN:  Scarring/scaling/hyperpigmentation papular lesions status post radiation treatments noted to right neck and chest.  Extremities:  Trace pitting edema to bilateral lower extremities    Labs:       Recent Labs     10/22/24  0412 10/23/24  0334 10/24/24  1025   WBC 30.0* 34.8* PENDING   RBC 2.82* 3.18* 2.29*   HGB 8.4* 9.4* 6.9*   HCT 24.5* 28.5* 20.8*   MCV 86.9 89.6 90.8   MCH 29.8 29.6 30.1   MCHC 34.3 33.0 33.2   RDW 17.6* 18.8* 19.1*   PLT 93* 95* See Reflexed IPF Result   MPV 12.7 12.4  --       BMP:   Recent Labs     10/21/24  1333 10/22/24  0412 10/23/24  0334 10/24/24  0510   * 124* 121*  --    K  --  4.9 5.9*  --    CL  --  87* 86*  --    CO2  --  22 17*  --    BUN  --  54* 69*  --    CREATININE  --  3.4* 4.2* 3.4*   GLUCOSE  --  81 84  --

## 2024-10-24 NOTE — PROGRESS NOTES
Attending Physician Statement  I have discussed the care of Jony Portillo, including pertinent history and exam findings with the resident again this afternoon.  Patient was getting more and more acidotic on BiPAP lactic acid came out to be 14 he is just started on CRRT by nephrology he is on 3 pressors his potassium was 6.0.  Critical care team had discussed with wife this morning and she wanted him to be intubated and wanted him to have ventilatory support and want to follow the wishes of the .  Will continue supportive care and intubation to follow their wishes.  Dr. Urias from palliative care had come and he had a long discussion with the patient and his wife (please see his note) and patient and wife does not want him to be intubated ventilatory support and wanted to change the CODE STATUS to DNR CCA and no intubation.  CODE STATUS was changed by Dr. Urias.  Will continue supportive care including CRRT labs antibiotic pressor support at this time   Family requested as patient was uncomfortable on BiPAP to take him off BiPAP which was done and patient placed on oxygen.      This note is created with the assistance of a speech recognition program.  While intent was to generate a document that actually reflects the content of the visit, the document can still have some errors including those of syntax and sound-alike substitutions which may escape proof reading.  It such instances, actual meaning can be extrapolated by contextual diversion.     Michele Hampton MD  10/24/2024 5:05 PM

## 2024-10-24 NOTE — PROGRESS NOTES
Critical Care Team - Daily Progress Note      Date and time: 10/24/2024 8:12 AM  Patient's name:  Jony Portillo  Medical Record Number: [id number]  Patient's account/billing number: [id number]  Patient's YOB: 1960  Age: 63 y.o.  Date of Admission: 10/19/2024  1:44 AM  Length of stay during current admission: 5      Primary Care Physician: Keisha Hackett MD  ICU Attending Physician:      Code Status: Full Code    Reason for ICU admission:   Chief Complaint   Patient presents with    Fatigue         SUBJECTIVE:     OVERNIGHT EVENTS:       Requiring three pressors Neto, vaso and levo  On Precedex   Bipap 40%  VB.28/34/174/16  Lactic acid POC 8.7  Pending labs this morning  On meropenem and vanc. Cultures negative so far  UOP? Dialysis yesterday with zero net removal      Brief history:  History was obtained from chart review and the patient.       Jony Portillo is a 63 y.o. with PMH of   -Adenocarcinoma of lung with metastasis to liver, bone, lymph nodes-on chemotherapy   -Hypothyroidism  -Right leg DVT      Presented to ER with generalized weakness and shortness of breath for the last 3 to 4 days, patient felt weak while going to the bathroom and had to lower himself to the ground, no loss of consciousness, associated with poor appetite.  In the ER patient was hypothermic with temperature 96.3, blood pressure 99/63 and initial labs showed hyponatremia with sodium 117, potassium 7.3, bicarb 12,   CT PE showed small pulmonary embolism in subsegmental branch of upper lobe laterally, mild to moderate COPD and scattered metastatic lesions  Initially patient was admitted to the ICU was started on heparin drip for PE and for NAZIA required hemodialysis and with improvement in sodium he was transferred out to stepdown on 10/21  On 10/22-patient underwent paracentesis with 4.6 L fluid removed and left femoral tunneled hemodialysis catheter placement.  Overnight patient was noted to be  other team members and physicians at least 40  Min so far today, excluding procedures.        This note is created with the assistance of a speech recognition program.  While intent was to generate a document that actually reflects the content of the visit, the document can still have some errors including those of syntax and sound-alike substitutions which may escape proof reading.  It such instances, actual meaning can be extrapolated by contextual diversion.      Michele Hampton MD  10/24/2024 9:34 AM

## 2024-10-24 NOTE — PROGRESS NOTES
Attending Physician Statement  I have discussed the care of Jony Portillo, including pertinent history and exam findings with the resident. I have reviewed the key elements of all parts of the encounter with the resident. I have seen and examined the patient with the resident.  I agree with the assessment and plan and status of the problem list as documented.    I saw the patient during around today, chart reviewed overnight events noted.  Patient is requiring more pressors getting more acidosis hyperkalemic, no labs are available this morning.  He is getting to be started on third pressors also, currently he is on vasopressin, Levophed, Neto-Synephrine.  He is hyponatremic hypokalemic worsening renal function with hypotension had hemodialysis yesterday he will require CRRT will give him 1 amp of bicarb in the meantime we will start him on bicarb drip numbers with D5 3 amp of bicarb started 75 mL per hour before CRRT is started.  Will follow potassium and will use insulin dextrose if hyperkalemia.   Will place arterial line get labs we will check BMP CBC lactic acid liver function test.  Will hold heparin drip and will restart after procedure is done.  Will get ABG.  He is currently on noninvasive ventilation likely dyspnea related to acidosis.  Will continue supportive care overall prognosis is poor metastatic cancer multiple pressor requirement and renal failure had discussion with wife yesterday she want to follow patient wishes to continue the care seen by palliative care will need palliative care follow-up.   Blood cultures so far negative empirically continue with meropenem and vancomycin.    Discussed with nursing staff, treatment and plan discussed.  Discussed with respiratory therapist.    Total critical care time caring for this patient with life threatening, unstable organ failure, including direct patient contact, management of life support systems, review of data including imaging and labs, discussions

## 2024-10-24 NOTE — PROCEDURES
PROCEDURE NOTE  Date: 10/24/2024   Name: Jony Portillo  YOB: 1960    Insert Arterial Line    Date/Time: 10/24/2024 5:05 PM    Performed by: Cecelia Hanna MD  Authorized by: Michele Hampton MD  Consent: The procedure was performed in an emergent situation. Verbal consent obtained.  Risks and benefits: risks, benefits and alternatives were discussed  Consent given by: spouse  Patient states understanding of procedure being performed: Wife.  Patient's understanding of procedure matches consent: Wife.  Site marked: the operative site was marked  Patient identity confirmed: arm band, provided demographic data and hospital-assigned identification number  Time out: Immediately prior to procedure a \"time out\" was called to verify the correct patient, procedure, equipment, support staff and site/side marked as required.  Preparation: Patient was prepped and draped in the usual sterile fashion.  Indications: multiple ABGs and hemodynamic monitoring  Location: right femoral  Anesthesia: local infiltration    Anesthesia:  Local Anesthetic: lidocaine 1% with epinephrine    Sedation:  Patient sedated: no    Number of attempts: 1  Post-procedure: line sutured and dressing applied      I was present and supervise the procedure.  No immediate complications  Michele Hampton MD10/24/62401:13 PM

## 2024-10-24 NOTE — PROGRESS NOTES
plan to stop carboplatin and continue with Alimta maintenance starting May/2024  Focused radiation to oligometastatic disease  Rapid progression earlier this month's> plan for CarboTaxol Avastin    Past Medical History:   has a past medical history of Cancer (HCC), History of DVT (deep vein thrombosis), Hx of blood clots, Snores, and Thyroid disease.    Past Surgical History:   has a past surgical history that includes cyst removal (); Dental surgery; US BIOPSY LYMPH NODE (8/15/2023); IR PORT PLACEMENT > 5 YEARS (9/15/2023); Neck surgery (N/A, 10/15/2024); and IR TUNNELED CVC PLACE WO SQ PORT/PUMP > 5 YEARS (10/22/2024).     Medications:    Reviewed in Epic     Allergies:  Patient has no known allergies.    Social History:   reports that he has been smoking cigarettes. He started smoking about 39 years ago. He has a 10.2 pack-year smoking history. He has never used smokeless tobacco. He reports current drug use. He reports that he does not drink alcohol.     Family History: family history includes Heart Attack (age of onset: 45) in his brother; Heart Disease in his brother and father; Leukemia (age of onset: 86) in his mother; Other in his father.    REVIEW OF SYSTEMS:    Unobtainable    PHYSICAL EXAM:      /71   Pulse (!) 105   Temp 97.3 °F (36.3 °C)   Resp 26   Ht 1.88 m (6' 2\")   Wt 77.3 kg (170 lb 6.7 oz)   SpO2 (!) 82%   BMI 21.88 kg/m²    Temp (24hrs), Av.7 °F (36.5 °C), Min:97.1 °F (36.2 °C), Max:98.8 °F (37.1 °C)    General appearance -very ill appearing, no in pain or distress   Mental status -not communicative/lethargic   eyes - pupils equal and reactive, extraocular eye movements intact   Ears - bilateral TM's and external ear canals normal   Mouth - mucous membranes moist, pharynx normal without lesions   Neck - supple, no significant adenopathy   Lymphatics - no palpable lymphadenopathy, no hepatosplenomegaly   Chest - clear to auscultation, no wheezes, rales or rhonchi, symmetric  air entry   Heart - normal rate, regular rhythm, normal S1, S2, no murmurs  Abdomen - soft, nontender, nondistended, no masses or organomegaly   Neurological lethargic  Musculoskeletal - no joint tenderness, deformity or swelling   Extremities - peripheral pulses normal, no pedal edema, no clubbing or cyanosis   Skin - normal coloration and turgor, no rashes, no suspicious skin lesions noted ,    DATA:    Labs:   CBC:   Recent Labs     10/23/24  0334 10/24/24  1025   WBC 34.8* 31.6*   HGB 9.4* 6.9*   HCT 28.5* 20.8*   PLT 95* See Reflexed IPF Result     BMP:   Recent Labs     10/23/24  0334 10/24/24  0510 10/24/24  1025   *  --  126*   K 5.9*  --  6.0*   CO2 17*  --  13*   BUN 69*  --  50*   CREATININE 4.2* 3.4* 3.5*   LABGLOM 15*  --  19*   GLUCOSE 84  --  52*     PT/INR:   Recent Labs     10/24/24  1025   PROTIME 45.6*   INR 5.1*         IMAGING DATA:  XR CHEST PORTABLE   Final Result   No acute cardiopulmonary findings         CTA HEAD NECK W CONTRAST   Final Result   1.  No acute intracranial findings.      2.  No dissection or high-grade stenosis of the major arteries in the head   and neck.      3.  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      4.  Interval right neck skin thickening with new 3.6 cm mass in the right   submandibular space, and new submental lymphadenopathy, concerning for   disease progression.      5.  Waxing and waning lymph nodes in the neck.         CT HEAD WO CONTRAST   Final Result   1.  No acute intracranial findings.      2.  No dissection or high-grade stenosis of the major arteries in the head   and neck.      3.  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      4.  Interval right neck skin thickening with new 3.6 cm mass in the right   submandibular space, and new submental lymphadenopathy, concerning for   disease progression.      5.  Waxing and waning lymph nodes in the neck.         XR CHEST PORTABLE   Final Result   1. No acute

## 2024-10-24 NOTE — PROGRESS NOTES
[facility]   Pharmacy Pharmacokinetic Monitoring Service - Vancomycin    Consulting Provider: Dr Hanna   Indication: sepsis  Target Concentration: Pre-Dialysis Concentration 15-20 mg/L  Day of Therapy: 2  Additional Antimicrobials: meropenem    Pertinent Laboratory Values:   Wt Readings from Last 1 Encounters:   10/24/24 77.3 kg (170 lb 6.7 oz)     Temp Readings from Last 1 Encounters:   10/24/24 97.5 °F (36.4 °C) (Axillary)     Recent Labs     10/23/24  0334 10/24/24  0510 10/24/24  1025   CREATININE 4.2* 3.4* 3.5*   BUN 69*  --  50*   WBC 34.8*  --  31.6*     Pertinent Cultures:  Culture Date Source Results   10.23 Blood x2 Pending   MRSA Nasal Swab: negative    Recent vancomycin administrations                     vancomycin (VANCOCIN) 1,500 mg in sodium chloride 0.9 % 250 mL IVPB ([id number]) (mg) 1,500 mg New Bag 10/23/24 1456        Assessment:  Date/Time Current Dose Concentration Dialysis Session   10.24 1500mg IV x1 15.4 mcg/mL CVVHD to start     Plan:  Concentration-guided dosing due to renal impairment and CVVHD  Current concentration is therapeutic   Will give additional Vancomycin 1000mg IV x1 today  Vancomycin concentration ordered with AM labs tomorrow  Pharmacy will continue to monitor patient and adjust therapy as indicated    Thank you for the consult,  Mildred Trejo PharmD BCPS BCCCP  10/24/2024 12:29 PM

## 2024-10-24 NOTE — PROGRESS NOTES
(170 lb 6.7 oz)   SpO2 94%   BMI 21.88 kg/m²     Blood pressure range: Systolic (24hrs), Av , Min:41 , Max:139   ; Diastolic (24hrs), Av, Min:29, Max:106      Review of Systems:  Cannot complete due to patient condition      Limited Neuro Exam:  Patient remains with O2 mask in place  He is lethargic  Opens eyes to name, L pupil 5mm and R pupil 4mm both sluggish  Face grossly symmetric   Does not follow commands to BUEs but does move them purposefully and symmetrically  Wiggles his toes to command  DTRs deferred        Data:    Lab Results:   CBC:   Recent Labs     10/22/24  0412 10/23/24  0334   WBC 30.0* 34.8*   HGB 8.4* 9.4*   PLT 93* 95*     BMP:    Recent Labs     10/21/24  1333 10/22/24  0412 10/23/24  0334 10/24/24  0510   * 124* 121*  --    K  --  4.9 5.9*  --    CL  --  87* 86*  --    CO2  --  22 17*  --    BUN  --  54* 69*  --    CREATININE  --  3.4* 4.2* 3.4*   GLUCOSE  --  81 84  --          Lab Results   Component Value Date    CHOL 224 (H) 2023    HDL 68 2023    TRIG 169 (H) 10/19/2024    ALT 35 10/19/2024    AST 46 10/19/2024    TSH 9.70 (H) 10/19/2024    INR 2.0 10/19/2024    LABA1C 5.6 2023    PHOS 6.0 (H) 10/21/2024           Diagnostic data reviewed:  CT HEAD/CTA HEAD AND NECK (10/23/24) -  1.  No acute intracranial findings.     2.  No dissection or high-grade stenosis of the major arteries in the head  and neck.     3.  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     4.  Interval right neck skin thickening with new 3.6 cm mass in the right  submandibular space, and new submental lymphadenopathy, concerning for  disease progression.     5.  Waxing and waning lymph nodes in the neck.                      Impression:  -Transient right sided hemiparesis in the setting of hypotension  -Subsegmental PE on heparin drip  -Metastatic lung cancer  -3 mm left cavernous aneurysm    Plan:  -Unable to obtain MRI brain due to necessary pressor support at this

## 2024-10-24 NOTE — CONSENT
Informed Consent for Blood Component Transfusion Note    I have discussed with the wife the rationale for blood component transfusion; its benefits in treating or preventing fatigue, organ damage, or death; and its risk which includes mild transfusion reactions, rare risk of blood borne infection, or more serious but rare reactions. I have discussed the alternatives to transfusion, including the risk and consequences of not receiving transfusion. The wife had an opportunity to ask questions and had agreed to proceed with transfusion of blood components.    Electronically signed by Cecelia Hanna MD on 10/24/24 at 11:09 AM EDT

## 2024-10-24 NOTE — PROGRESS NOTES
Today's Date: 10/23/2024  Patient Name: Jony Portillo  Date of admission: 10/19/2024  1:44 AM  Patient's age: 63 y.o., 1960  Admission Dx: Hyperkalemia [E87.5]  Hyponatremia [E87.1]  NAZIA (acute kidney injury) (HCC) [N17.9]  Acute pulmonary embolism without acute cor pulmonale, unspecified pulmonary embolism type (HCC) [I26.99]    Reason for Consult: Metastatic lung cancer on chemotherapy/pulm embolism  Requesting Physician: Eron Monahan MD    CHIEF COMPLAINT: Weakness and shortness of breath    History Obtained From:  patient    Interval history  Overnight patient's found to be hypotensive stroke team has been contacted and transferred to ICU  Seen and examined  Family at bedside  Declining condition  Not communicative    HISTORY OF PRESENT ILLNESS:    The patient is a 63 y.o.  male who is admitted to the hospital for weakness and shortness of breath of 4 days duration in emergency room blood pressure was low and heart rate in the 80s labs did show sodium at 117 with potassium 7.3 and creatinine 4.4 with WBC 28  CT PE showed small pulmonary embolism in the subsegmental branch of upper lobe laterally right pulmonary artery, mild to moderate COPD: Scattered metastatic lesions   Patient well-known to our service she had metastatic adenocarcinoma of the lung oncology history as below      DIAGNOSIS:   Multiple cervical adenopathy  Biopsy showed adenocarcinoma suggestive of lung primary.  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  Good response to 4 cycles

## 2024-10-24 NOTE — PROGRESS NOTES
PALLIATIVE CARE PROGRESS NOTE     NAME:  Jony Portillo  MEDICAL RECORD NUMBER:  [id number]  AGE: 63 y.o.   GENDER: male  : 1960  TODAY'S DATE:  10/24/2024  Room: 3023/3023-01    Reason For Consult:  Goals of care evaluation  Distress management  Symptom Management  Guidance and support  Facilitate communications  Assistance in coordinating care  Recommendations for the above    Plan      Palliative Interaction:  Jimmie was seen today on rounds today.  His wife is present at bedside.  Since my last encounter, he has taken a major turn for the worse.  He is now in full blown shock and is in liver failure.  He is in respiratory distress and is becoming more acidotic.  He likely needs to be intubated as soon as possible.    I introduced myself to his wife and explained my role in his treatment team.  She recalls speaking with our RN, Yakelin, yesterday.    She tells me that she is aware that Jimmie's organs are shutting down. She also tells me that he told her and family last night that he wanted to be placed on the ventilator. She says that when he loses the ability to make a medical decision, she would honor the intubation, but change his resuscitation to DNR-CCA so chest compressions are not performed.    With permission, I shared with her that I am concerned about Jimmie surviving the day. I explained that he is actively dying. I explained that this is all in the setting of a significant underlying cancer.     I did explain to her that by performing CPR and placing him on the vent, we would likely be only prolonging the inevitable, unfortunately.    She notes that their daughter was here earlier today. Two other children are coming now, which I strongly encouraged.    I was able to speak directly to Jimmie regarding his prognosis. ABG was drawn prior to talking with him that showed significant metabolic acidosis. He needs to be placed on mechanical ventilation.    I explained all of this to Jimmie and explained that I am

## 2024-10-25 NOTE — DEATH NOTES
Death Pronouncement Note  Patient's Name: Jony Portillo   Patient's YOB: 1960  MRN Number: [id number]    Admitting Provider: Eron Monahan MD  Attending Provider: Alejandro Yang MD    Patient was examined and the following were absent: Pulses, Blood Pressure, and Respiratory effort    I declared the patient dead on 10/25/2024 at 7:08 AM    Preliminary Cause of Death: Cardiopulmonary arrest     Electronically signed by Cecelia Hanna MD on 10/25/24 at 7:11 AM EDT

## 2024-10-25 NOTE — PLAN OF CARE
Problem: Respiratory - Adult  Goal: Achieves optimal ventilation and oxygenation  Flowsheets (Taken 10/24/2024 2037)  Achieves optimal ventilation and oxygenation:   Assess for changes in respiratory status   Assess for changes in mentation and behavior   Position to facilitate oxygenation and minimize respiratory effort   Oxygen supplementation based on oxygen saturation or arterial blood gases   Initiate smoking cessation protocol as indicated   Encourage broncho-pulmonary hygiene including cough, deep breathe, incentive spirometry   Assess the need for suctioning and aspirate as needed   Respiratory therapy support as indicated

## 2024-10-25 NOTE — ED PROVIDER NOTES
[facility]   Emergency Department  Faculty Attestation       I performed a history and physical examination of the patient and discussed management with the resident. I reviewed the resident’s note and agree with the documented findings including all diagnostic interpretations and plan of care. Any areas of disagreement are noted on the chart. I was personally present for the key portions of any procedures. I have documented in the chart those procedures where I was not present during the key portions. I have reviewed the emergency nurses triage note. I agree with the chief complaint, past medical history, past surgical history, allergies, medications, social and family history as documented unless otherwise noted below.  For Physician Assistant/ Nurse Practitioner cases/documentation I have personally evaluated this patient and have completed at least one if not all key elements of the E/M (history, physical exam, and MDM). Additional findings are as noted.    Patient Name: Jony Portillo  MRN: [id number]  : 1960  Primary Care Physician: Keisha Hackett MD    Date of evaluationa: 10/19/2024   Note Started: 10:13 PM EDT    Pertinent Comments     Chief Complaint:   Chief Complaint   Patient presents with    Fatigue        Initial vitals: (If not listed, please see nursing documentation)  ED Triage Vitals   BP Systolic BP Percentile Diastolic BP Percentile Temp Temp Source Pulse Respirations SpO2   10/19/24 0146 -- -- 10/19/24 0216 10/19/24 0216 10/19/24 0146 10/19/24 0146 10/19/24 0146   130/83   (!) 96 °F (35.6 °C) Rectal 81 30 100 %      Height Weight - Scale         10/19/24 0630 10/19/24 0600         1.88 m (6' 2\") 76.6 kg (168 lb 14 oz)              HPI/PE/Impression:  This is a 63 y.o. male who presents to the Emergency Department with complaints of generalized weakness for approximately 3 to 4 days and feeling severely fatigued.  Also been having associated shortness of breath and

## 2024-10-25 NOTE — PLAN OF CARE
Problem: Safety - Adult  Goal: Free from fall injury  10/24/2024 2152 by Sofia Richardson RN  Outcome: Progressing  Flowsheets (Taken 10/24/2024 2045)  Free From Fall Injury: Instruct family/caregiver on patient safety  10/24/2024 1452 by Venessa Bhatia RN  Outcome: Progressing     Problem: Discharge Planning  Goal: Discharge to home or other facility with appropriate resources  10/24/2024 2152 by Sofia Richardson RN  Outcome: Progressing  10/24/2024 1452 by Venessa Bhatia RN  Outcome: Progressing     Problem: Pain  Goal: Verbalizes/displays adequate comfort level or baseline comfort level  10/24/2024 2152 by Sofia Richardson RN  Outcome: Progressing  Flowsheets (Taken 10/24/2024 2000)  Verbalizes/displays adequate comfort level or baseline comfort level: Assess pain using appropriate pain scale  10/24/2024 1452 by Venessa Bhatia RN  Outcome: Progressing     Problem: Skin/Tissue Integrity  Goal: Absence of new skin breakdown  Description: 1.  Monitor for areas of redness and/or skin breakdown  2.  Assess vascular access sites hourly  3.  Every 4-6 hours minimum:  Change oxygen saturation probe site  4.  Every 4-6 hours:  If on nasal continuous positive airway pressure, respiratory therapy assess nares and determine need for appliance change or resting period.  10/24/2024 2152 by Sofia Richardson RN  Outcome: Progressing  10/24/2024 1452 by Venessa Bhatia RN  Outcome: Progressing     Problem: ABCDS Injury Assessment  Goal: Absence of physical injury  Outcome: Progressing  Flowsheets (Taken 10/24/2024 2045)  Absence of Physical Injury: Implement safety measures based on patient assessment     Problem: Nutrition Deficit:  Goal: Optimize nutritional status  Outcome: Progressing     Problem: Respiratory - Adult  Goal: Achieves optimal ventilation and oxygenation  10/24/2024 2152 by Sofia Richardson RN  Outcome: Progressing  10/24/2024 2037 by Monserrat Larosn RCP  Flowsheets (Taken 10/24/2024

## 2024-10-25 NOTE — TELEPHONE ENCOUNTER
Name: Jony Portillo  : 1960  MRN: [id number]    Oncology Navigation Follow-Up Note    Contact Type:  Telephone    Notes:   Navigator reviewing chart and pt.  -cardiac arrest this AM      Electronically signed by Erica Martin RN on 10/25/2024 at 7:48 AM

## 2024-10-25 NOTE — PROGRESS NOTES
[place] [facility] [place]  DEATH   Shift date: 10/24/2024    Shift day: Thursday  Shift #: 3                 Room # 3023/3023-01   Name: Jony Portillo            Age: 63 y.o.  Gender: male          Islam: Non-Tenriism      Place of Christianity: Unknown  Admit Date & Time: 10/19/2024  1:44 AM     Referral: Bedside Nurse   Actual date of death: 10/25/2024   TOD: 0708       SITUATION AT DEATH:  Per report, patient's time of death was declared at 0708 by Dr. Hanna. Patient's spouse was sitting in chair at bedside and leaning on patient's shoulder, when  arrived.    IS THIS A 'S CASE?  No    SPIRITUAL/EMOTIONAL INTERVENTION:   responded to call from unit nurse indicating that patient had .  met with patient's spouse at bedside and provided support to her.  allowed private time for her in room, as more family began to arrive.  returned to room and provided support and hospitality to family. Patient's family shared about patient's love of the outdoors and the love he has for his family. Per spouse, family has chosen cremation, however, family has not yet chosen a location for final arrangements.     Family Received Grief Packet?  No,  will mail sympathy card and grief packet to be sent to family.       NAME AND PHONE NUMBER OF DOCTOR SIGNING DEATH CERTIFICATE: Dr. Alejandro Yang ([phone])     are now contacting the  home after a patient death.  spoke to/left message for N/A indicating family's choice for their services.  called  home on N/A.    Copy of COMPLETED Release of Body Form Received?  Yes    Patient's belongings: Unknown     HOME:  To be Determined.    NEXT OF KIN:  Name: Connie Portillo  Relationship: Spouse  Street Address: [address] [place] [address]  City: [place]  State: [place]  Zip code: [address]   Phone Number: [phone]     ANY FOLLOW-UP NEEDED?  Yes    IF SO, WHAT?  Patient's

## 2024-10-25 NOTE — PROGRESS NOTES
0701 - CRRT discontinued at this time d/t hemodynamic instability. Fanta Silva and Cyndi called to bedside. See vitals flowsheet. See MAR for vasopressor titrations.

## 2024-10-25 NOTE — PROGRESS NOTES
received perfect serve for courtesy cart to 3023.  created courtesy cart with the help of nutrition staff member and brought cart to room. Family expressed gratitude for beverages and snacks.    Electronically signed by Jony Lane on 10/24/2024 at 9:33 PM

## 2024-10-26 LAB
ABO/RH: NORMAL
ANTIBODY SCREEN: NEGATIVE
ARM BAND NUMBER: NORMAL
BLOOD BANK BLOOD PRODUCT EXPIRATION DATE: NORMAL
BLOOD BANK DISPENSE STATUS: NORMAL
BLOOD BANK DISPENSE STATUS: NORMAL
BLOOD BANK ISBT PRODUCT BLOOD TYPE: 5100
BLOOD BANK PRODUCT CODE: NORMAL
BLOOD BANK SAMPLE EXPIRATION: NORMAL
BLOOD BANK UNIT TYPE AND RH: NORMAL
BPU ID: NORMAL
BPU ID: NORMAL
COMPONENT: NORMAL
COMPONENT: NORMAL
CROSSMATCH RESULT: NORMAL
CROSSMATCH RESULT: NORMAL
TRANSFUSION STATUS: NORMAL
TRANSFUSION STATUS: NORMAL
UNIT DIVISION: 0
UNIT DIVISION: 0
UNIT ISSUE DATE/TIME: NORMAL

## 2024-10-27 LAB
MICROORGANISM SPEC CULT: NORMAL
MICROORGANISM/AGENT SPEC: NORMAL
SERVICE CMNT-IMP: NORMAL
SPECIMEN DESCRIPTION: NORMAL

## 2024-10-28 ENCOUNTER — TELEPHONE (OUTPATIENT)
Dept: PULMONOLOGY | Age: 64
End: 2024-10-28

## 2024-10-28 LAB
MICROORGANISM SPEC CULT: NORMAL
MICROORGANISM SPEC CULT: NORMAL
SERVICE CMNT-IMP: NORMAL
SERVICE CMNT-IMP: NORMAL
SPECIMEN DESCRIPTION: NORMAL
SPECIMEN DESCRIPTION: NORMAL

## 2024-10-28 NOTE — TELEPHONE ENCOUNTER
Will you please review the chart and let me know which one of you will be signing the death certificate?

## 2024-10-30 NOTE — DISCHARGE SUMMARY
transferred out to stepdown on 10/21  On 10/22-patient underwent paracentesis with 4.6 L fluid removed and left femoral tunneled hemodialysis catheter placement.  Overnight patient was noted to be hypotensive which did not respond to midodrine and fluid bolus.  Critical care was consulted as patient was subjectively short of breath on 6 L of oxygen and hypotensive and while ongoing evaluation patient started having symptoms concerning for stroke-inability to close his right eye, not able to raise right eyebrow, weakness in right side of extremities, patient was immediately started on Levophed and CT head and CTA were done  Subsequently patient is being admitted to medical ICU.    10/25: on three pressors to keep hemodynamic stability Levophed, Neto, and vaso. Given medical treatment for hyperkalemia. And then CRRT. He was also receiving meropenem and vancomycin empirically.  Patient subsequently became more lethargic and drowsy. Family discussion held with palliative and decision was made to change the code status to DNR CCA on 10/24 with NO INTUBATION.     Unfortunately, patient passed away the morning of 10/25 at 7:11 am.      Procedures/ Significant Interventions:    Dialysis    Consults:     Consults:     Final Specialist Recommendations/Findings:   IP CONSULT TO VASCULAR SURGERY  IP CONSULT TO NEPHROLOGY  IP CONSULT TO CRITICAL CARE  IP CONSULT TO HEM/ONC  IP CONSULT TO PALLIATIVE CARE  IP CONSULT TO SPIRITUAL SERVICES  IP CONSULT TO CRITICAL CARE  IP CONSULT TO NEUROLOGY  PHARMACY TO DOSE VANCOMYCIN  IP CONSULT TO VASCULAR ACCESS TEAM      Any Hospital Acquired Infections: none    Discharge Functional Status:  stable    DISCHARGE PLAN     Disposition:           Cecelia Hanna MD, MD  Internal Medicine Resident  [facility]; [place], [place]  10/30/2024, 8:53 AM     Attending Physician Statement  I have discussed the care of Jony Portillo, including pertinent history and exam findings with

## (undated) DEVICE — DRAPE,T,LAPARO,TRANS,STERILE: Brand: MEDLINE

## (undated) DEVICE — ST. CHARLES BASIC SET UP: Brand: MEDLINE INDUSTRIES, INC.

## (undated) DEVICE — SOLUTION IRRIG 1000ML 0.9% SOD CHL USP POUR PLAS BTL

## (undated) DEVICE — SUTURE VICRYL + SZ 3-0 L27IN ABSRB UD L26MM SH 1/2 CIR VCP416H

## (undated) DEVICE — SOLUTION IRRIG 1000ML STRL H2O USP PLAS POUR BTL

## (undated) DEVICE — GLOVE ORTHO 7 1/2   MSG9475

## (undated) DEVICE — PAD,NON-ADHERENT,3X8,STERILE,LF,1/PK: Brand: MEDLINE